# Patient Record
Sex: FEMALE | Race: WHITE | Employment: UNEMPLOYED | URBAN - METROPOLITAN AREA
[De-identification: names, ages, dates, MRNs, and addresses within clinical notes are randomized per-mention and may not be internally consistent; named-entity substitution may affect disease eponyms.]

---

## 2017-06-27 PROBLEM — N84.1 CERVICAL POLYP: Status: ACTIVE | Noted: 2017-06-27

## 2017-06-27 PROBLEM — N95.0 POSTMENOPAUSAL BLEEDING: Status: ACTIVE | Noted: 2017-06-27

## 2017-06-27 PROBLEM — Z01.419 WOMEN'S ANNUAL ROUTINE GYNECOLOGICAL EXAMINATION: Status: ACTIVE | Noted: 2017-06-27

## 2017-07-24 PROBLEM — R10.2 PELVIC PAIN IN FEMALE: Status: ACTIVE | Noted: 2017-07-24

## 2017-07-26 ENCOUNTER — HOSPITAL ENCOUNTER (EMERGENCY)
Age: 62
Discharge: HOME | End: 2017-07-26
Attending: EMERGENCY MEDICINE
Payer: MEDICARE

## 2017-07-26 ENCOUNTER — APPOINTMENT (OUTPATIENT)
Dept: CT IMAGING | Age: 62
End: 2017-07-26
Attending: EMERGENCY MEDICINE
Payer: MEDICARE

## 2017-07-26 VITALS
RESPIRATION RATE: 16 BRPM | SYSTOLIC BLOOD PRESSURE: 132 MMHG | DIASTOLIC BLOOD PRESSURE: 80 MMHG | OXYGEN SATURATION: 100 % | HEIGHT: 62 IN | TEMPERATURE: 97.5 F | HEART RATE: 94 BPM | BODY MASS INDEX: 44.72 KG/M2 | WEIGHT: 243 LBS

## 2017-07-26 DIAGNOSIS — R10.9 ABDOMINAL PAIN, OTHER SPECIFIED SITE: Primary | ICD-10-CM

## 2017-07-26 LAB
ALBUMIN SERPL BCP-MCNC: 3.8 G/DL (ref 3.2–4.6)
ALBUMIN/GLOB SERPL: 1.2 {RATIO} (ref 1.2–3.5)
ALP SERPL-CCNC: 145 U/L (ref 50–136)
ALT SERPL-CCNC: 42 U/L (ref 12–65)
ANION GAP BLD CALC-SCNC: 9 MMOL/L (ref 7–16)
AST SERPL W P-5'-P-CCNC: 18 U/L (ref 15–37)
BASOPHILS # BLD AUTO: 0 K/UL (ref 0–0.2)
BASOPHILS # BLD: 0 % (ref 0–2)
BILIRUB SERPL-MCNC: 1.1 MG/DL (ref 0.2–1.1)
BUN SERPL-MCNC: 20 MG/DL (ref 8–23)
CALCIUM SERPL-MCNC: 9.2 MG/DL (ref 8.3–10.4)
CHLORIDE SERPL-SCNC: 108 MMOL/L (ref 98–107)
CO2 SERPL-SCNC: 30 MMOL/L (ref 21–32)
CREAT SERPL-MCNC: 1.06 MG/DL (ref 0.6–1)
DIFFERENTIAL METHOD BLD: ABNORMAL
EOSINOPHIL # BLD: 0.1 K/UL (ref 0–0.8)
EOSINOPHIL NFR BLD: 1 % (ref 0.5–7.8)
ERYTHROCYTE [DISTWIDTH] IN BLOOD BY AUTOMATED COUNT: 13.4 % (ref 11.9–14.6)
GLOBULIN SER CALC-MCNC: 3.2 G/DL (ref 2.3–3.5)
GLUCOSE SERPL-MCNC: 91 MG/DL (ref 65–100)
HCT VFR BLD AUTO: 44.8 % (ref 35.8–46.3)
HGB BLD-MCNC: 15.6 G/DL (ref 11.7–15.4)
IMM GRANULOCYTES # BLD: 0 K/UL (ref 0–0.5)
IMM GRANULOCYTES NFR BLD AUTO: 0.2 % (ref 0–5)
LYMPHOCYTES # BLD AUTO: 44 % (ref 13–44)
LYMPHOCYTES # BLD: 4.7 K/UL (ref 0.5–4.6)
MCH RBC QN AUTO: 31 PG (ref 26.1–32.9)
MCHC RBC AUTO-ENTMCNC: 34.8 G/DL (ref 31.4–35)
MCV RBC AUTO: 89.1 FL (ref 79.6–97.8)
MONOCYTES # BLD: 0.6 K/UL (ref 0.1–1.3)
MONOCYTES NFR BLD AUTO: 6 % (ref 4–12)
NEUTS SEG # BLD: 5.3 K/UL (ref 1.7–8.2)
NEUTS SEG NFR BLD AUTO: 49 % (ref 43–78)
PLATELET # BLD AUTO: 223 K/UL (ref 150–450)
PMV BLD AUTO: 10.1 FL (ref 10.8–14.1)
POTASSIUM SERPL-SCNC: 3.6 MMOL/L (ref 3.5–5.1)
PROT SERPL-MCNC: 7 G/DL (ref 6.3–8.2)
RBC # BLD AUTO: 5.03 M/UL (ref 4.05–5.25)
SODIUM SERPL-SCNC: 147 MMOL/L (ref 136–145)
WBC # BLD AUTO: 10.8 K/UL (ref 4.3–11.1)

## 2017-07-26 PROCEDURE — 96361 HYDRATE IV INFUSION ADD-ON: CPT | Performed by: EMERGENCY MEDICINE

## 2017-07-26 PROCEDURE — 81003 URINALYSIS AUTO W/O SCOPE: CPT | Performed by: EMERGENCY MEDICINE

## 2017-07-26 PROCEDURE — 96375 TX/PRO/DX INJ NEW DRUG ADDON: CPT | Performed by: EMERGENCY MEDICINE

## 2017-07-26 PROCEDURE — 96374 THER/PROPH/DIAG INJ IV PUSH: CPT | Performed by: EMERGENCY MEDICINE

## 2017-07-26 PROCEDURE — 99284 EMERGENCY DEPT VISIT MOD MDM: CPT | Performed by: EMERGENCY MEDICINE

## 2017-07-26 PROCEDURE — 74011250636 HC RX REV CODE- 250/636: Performed by: EMERGENCY MEDICINE

## 2017-07-26 PROCEDURE — 80053 COMPREHEN METABOLIC PANEL: CPT | Performed by: EMERGENCY MEDICINE

## 2017-07-26 PROCEDURE — 74176 CT ABD & PELVIS W/O CONTRAST: CPT

## 2017-07-26 PROCEDURE — 85025 COMPLETE CBC W/AUTO DIFF WBC: CPT | Performed by: EMERGENCY MEDICINE

## 2017-07-26 RX ORDER — ONDANSETRON 2 MG/ML
4 INJECTION INTRAMUSCULAR; INTRAVENOUS
Status: COMPLETED | OUTPATIENT
Start: 2017-07-26 | End: 2017-07-26

## 2017-07-26 RX ORDER — MORPHINE SULFATE 4 MG/ML
4 INJECTION, SOLUTION INTRAMUSCULAR; INTRAVENOUS
Status: COMPLETED | OUTPATIENT
Start: 2017-07-26 | End: 2017-07-26

## 2017-07-26 RX ORDER — HYOSCYAMINE SULFATE 0.12 MG/1
0.12 TABLET SUBLINGUAL
Qty: 15 TAB | Refills: 0 | Status: SHIPPED | OUTPATIENT
Start: 2017-07-26 | End: 2017-08-16

## 2017-07-26 RX ADMIN — SODIUM CHLORIDE 1000 ML: 900 INJECTION, SOLUTION INTRAVENOUS at 19:27

## 2017-07-26 RX ADMIN — ONDANSETRON 4 MG: 2 INJECTION INTRAMUSCULAR; INTRAVENOUS at 19:27

## 2017-07-26 RX ADMIN — MORPHINE SULFATE 4 MG: 4 INJECTION, SOLUTION INTRAMUSCULAR; INTRAVENOUS at 19:27

## 2017-07-26 NOTE — ED PROVIDER NOTES
HPI Comments: 56-year-old white female presents with lower abdominal pain which has been present for the past 2 weeks. She states began within hours of having a ultrasound performed on her uterus. She has had nausea and vomiting for the past 4 days. No diarrhea, constipation or urinary changes. Her OB/GYN has placed her on multiple rounds of antibiotics to Zithromax and doxycycline as well as hydrocodone without improvement in her pain. She went to the office today and was advised that she needs a CT scan and that it would be much faster if she went to the emergency department to have it done. Patient is a 57 y.o. female presenting with skin problem and abdominal pain. The history is provided by the patient. Skin Problem      Abdominal Pain    Pertinent negatives include no fever, no diarrhea, no nausea, no vomiting, no constipation, no dysuria, no headaches and no back pain.         Past Medical History:   Diagnosis Date    Arthritis     Bleeding from nipple in female     Breast lump     CKD (chronic kidney disease) 11/13/15    followed by Dr. 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    History of DVT of lower extremity     1973 (left leg)     2007 (right leg, following knee surgery)    HTN (hypertension)     controlled with lisinopril    Hx of gout     Hyperlipidemia     controlled with lipitor    Left ear hearing loss 11/11/2016    Mitral valve prolapse     last echo ?2004 at [facility] Morbid obesity ([facility] [address] 11/13/15    BMI 44.7    Pinched nerve in neck     9/2016    Post-operative nausea and vomiting     PUD (peptic ulcer disease) 1980's    Sleep apnea     pt states due to her snoring; no cpap    Unspecified adverse effect of anesthesia 1983    delayed awakening with D&C    Vitamin D deficiency     takes supplement       Past Surgical History:   Procedure Laterality Date    HX APPENDECTOMY      HX BREAST BIOPSY Left 11/20/2015    BREAST NEEDLE LOCALIZED LUMPECTOMY LEFT / PREOP @1000/ BREAST CENTER @1130 FOR LEFT US NEEDLE LOC/ SURGERY @1330 performed by Adela Rivas MD at [address] HX COLONOSCOPY  2015    polyps; pt states pre-cancerous    HX DILATION AND CURETTAGE  1982, 1999, 2003    X3    HX HEENT Bilateral at age 1    muscle & balances sx    HX ORTHOPAEDIC Right     knee sx X2; torn meniscus & acl torn     HX TONSILLECTOMY      SHOULDER SURG PROC UNLISTED Bilateral last ones 2006 & 2007    \"multiple\"; last shoulder sx pt states came in for left shoulder repair; pt states while being moved from stretcher to OR table operating employee pulled on Right arm while pt telling her to stop & pt states right rotator cuff was torn         Family History:   Problem Relation Age of Onset    Cancer Mother      lung cancer    Parkinsonism Father     Cancer Brother      nose cancer    Cancer Brother      lung cancer    Diabetes Maternal Grandfather     Hypertension Paternal Grandfather     Stroke Paternal Grandfather     Breast Cancer Neg Hx     Ovarian Cancer Neg Hx        Social History     Social History    Marital status:      Spouse name: N/A    Number of children: N/A    Years of education: N/A     Occupational History    Not on file. Social History Main Topics    Smoking status: Never Smoker    Smokeless tobacco: Never Used    Alcohol use No    Drug use: No    Sexual activity: Not on file     Other Topics Concern    Caffeine Concern No    Exercise No    Seat Belt Yes    Self-Exams Yes     Social History Narrative    , but divorce process has been going on for 3 years.  lives in [place]. Worked as , but now disabled due to CVA residual effects. Has 2 sons. ALLERGIES: Iodinated contrast- oral and iv dye; Ultram [tramadol]; Aloe vera; Codeine; Erythromycin; Heparin analogues; Lanolin; Other medication; Vigamox [moxifloxacin]; Aspartame; Augmentin [amoxicillin-pot clavulanate]; Cefzil [cefprozil]; and Sulfa (sulfonamide antibiotics)    Review of Systems   Constitutional: Negative for fever. HENT: Negative for congestion. Respiratory: Negative for cough and shortness of breath. Gastrointestinal: Positive for abdominal pain. Negative for constipation, diarrhea, nausea and vomiting. Genitourinary: Negative for dysuria. Musculoskeletal: Negative for back pain and neck pain. Skin: Negative for rash. Neurological: Negative for headaches. Vitals:    07/26/17 1657   BP: 136/83   Pulse: 99   Resp: 20   Temp: 97.5 °F (36.4 °C)   SpO2: 100%   Weight: 110.2 kg (243 lb)   Height: 5' 2\" (1.575 m)            Physical Exam   Constitutional: She appears well-developed and well-nourished. No distress. HENT:   Head: Normocephalic and atraumatic. Mouth/Throat: Oropharynx is clear and moist.   Eyes: Conjunctivae are normal. Pupils are equal, round, and reactive to light. Neck: Normal range of motion. Neck supple. Cardiovascular: Normal rate and regular rhythm. No murmur heard. Pulmonary/Chest: Effort normal and breath sounds normal.   Abdominal: Soft. There is tenderness in the right lower quadrant, suprapubic area and left lower quadrant. There is no rigidity, no rebound, no guarding and no CVA tenderness. Musculoskeletal: Normal range of motion. Neurological: She is alert. Skin: Skin is warm and dry. Psychiatric: She has a normal mood and affect. Nursing note and vitals reviewed. MDM  Number of Diagnoses or Management Options  Diagnosis management comments: Blood work is unremarkable. Urinalysis shows no infection.   CT abdomen and pelvis shows no acute abnormality. Etiology for her pain is not clear but does not appear to be a surgical or infectious emergency. He has been present for 2 weeks and further workup can be performed by primary care.        Amount and/or Complexity of Data Reviewed  Clinical lab tests: ordered and reviewed  Tests in the radiology section of CPT®: ordered and reviewed  Tests in the medicine section of CPT®: ordered and reviewed      ED Course       Procedures

## 2017-07-26 NOTE — ED TRIAGE NOTES
Patient states was sent to ER by PCP. Patient reports lower abd pain and left breast pain around skin problem. Patient also c/o nausea, vomiting x 3-4 days.

## 2017-07-26 NOTE — DISCHARGE INSTRUCTIONS

## 2017-07-27 ENCOUNTER — HOSPITAL ENCOUNTER (OUTPATIENT)
Dept: SURGERY | Age: 62
Discharge: HOME | End: 2017-07-27
Attending: OBSTETRICS & GYNECOLOGY

## 2017-07-27 VITALS
SYSTOLIC BLOOD PRESSURE: 165 MMHG | HEART RATE: 82 BPM | OXYGEN SATURATION: 98 % | WEIGHT: 243.6 LBS | DIASTOLIC BLOOD PRESSURE: 84 MMHG | TEMPERATURE: 96.4 F | RESPIRATION RATE: 18 BRPM | HEIGHT: 62 IN | BODY MASS INDEX: 44.83 KG/M2

## 2017-07-27 RX ORDER — ENOXAPARIN SODIUM 150 MG/ML
120 INJECTION SUBCUTANEOUS
COMMUNITY
End: 2017-08-16

## 2017-07-27 NOTE — H&P
[facility] H&P      Assessment/Plan    Problem List  Date Reviewed: 7/24/2017          Codes Class    Pelvic pain in female ICD-10-CM: R10.2  ICD-9-CM: 625.9     Overview Signed 7/24/2017 11:50 AM by Stacey Clements MD     noted             Women's annual routine gynecological examination ICD-10-CM: Z01.419  ICD-9-CM: V72.31     Overview Signed 6/27/2017  2:15 PM by Stacey Clements MD     Pap done 6/27/17             Postmenopausal bleeding ICD-10-CM: N95.0  ICD-9-CM: 627.1     Overview Addendum 7/12/2017  2:42 PM by Stacey Clements MD     Noted    Present and reviewed US with pt  D/W her that in light of thickened EMS and polyp I would consider hyst D&C with polypectomy in OR (also with pt signif discomfort with pap/exam last visit)  ACOG pamphlet on hyst D&C given to pt. Pt to get clearance and recs for lovenox transition by PCP prior to scheduleing procedure  D/W pt at length risks/benefits of procedure including but not limited to death, bleeding, infection, perforation and damage to other internal organs. She exhibited full understanding and wishes to proceed.                Cervical polyp ICD-10-CM: N84.1  ICD-9-CM: 622.7     Overview Addendum 7/12/2017  2:42 PM by Stacey Clements MD     Noted 6/27/17    Plan removal in OR with coinciding hyst D&C             Left ear hearing loss ICD-10-CM: H91.92  ICD-9-CM: 389.9         Hoarseness ICD-10-CM: R49.0  ICD-9-CM: 784.42         Decreased GFR ICD-10-CM: R94.4  ICD-9-CM: 794.4         Post traumatic stress disorder (PTSD) ICD-10-CM: F43.10  ICD-9-CM: 309.81         Insomnia, idiopathic ICD-10-CM: F51.01  ICD-9-CM: 307.42         Snoring ICD-10-CM: R06.83  ICD-9-CM: 786.09         Fatigue ICD-10-CM: R53.83  ICD-9-CM: 780.79         Hyperuricemia ICD-10-CM: E79.0  ICD-9-CM: 790.6         Chronic renal disease, stage 3, moderately decreased glomerular filtration rate (GFR) between 30-59 mL/min/1.73 square meter ICD-10-CM: N18.3  ICD-9-CM: 585.3         B12 deficiency ICD-10-CM: E53.8  ICD-9-CM: 266.2         History of CVA (cerebrovascular accident) ICD-10-CM: Z86.73  ICD-9-CM: V12.54         GERD (gastroesophageal reflux disease) ICD-10-CM: K21.9  ICD-9-CM: 530.81         HTN (hypertension) ICD-10-CM: I10  ICD-9-CM: 401.9         Gouty arthropathy ICD-10-CM: M10.9  ICD-9-CM: 274.00         Current use of long term anticoagulation ICD-10-CM: Z79.01  ICD-9-CM: V58.61         H/O right knee surgery ICD-10-CM: Z98.890  ICD-9-CM: V15.29         History of DVT of lower extremity ICD-10-CM: Z86.718  ICD-9-CM: V12.51         Hyperlipidemia ICD-10-CM: E78.5  ICD-9-CM: 272.4         IFG (impaired fasting glucose) ICD-10-CM: R73.01  ICD-9-CM: 790.21               Subjective    Rozanna Chin 57 y.o. presents today for surgical evaluation/treatment of the condition noted above. She is without any new complaints or issues.     OB History    Para Term  AB Living   5 3    3   SAB TAB Ectopic Molar Multiple Live Births        3      # Outcome Date GA Lbr Prashanth/2nd Weight Sex Delivery Anes PTL Lv   5          DOMITILA   4          DOMITILA   3 Para         DOMITILA   2 Para            1 Para                   Past Medical History:   Diagnosis Date    Arthritis     Bleeding from nipple in female     Breast lump     CKD (chronic kidney disease) 2015    followed by Dr. Lizet Donaldson; pt states now \"stage 1\"    Current use of long term anticoagulation     pt takes coumadin due to hx of CVA    GERD (gastroesophageal reflux disease)     controlled with med    H/O right knee surgery      (twice within a few weeks)    History of CVA (cerebrovascular accident)     \"optic eye stroke\"; blindness in left eye & complete loss of hearing to left ear    History of DVT of lower extremity      (left leg)      (right leg, following knee surgery)    HTN (hypertension)     controlled with lisinopril    Hx of gout     Hyperlipidemia     controlled with lipitor    Left ear hearing loss 11/11/2016    Mitral valve prolapse     last echo ?2004 at [facility] Morbid obesity ([facility] [address] 11/13/15    BMI 44.7    Pinched nerve in neck     9/2016    Post-operative nausea and vomiting     pt reports severe N/V : request e-mend or scopolamine patch; pt reports hoarseness, cough s/p intubation    PUD (peptic ulcer disease) 1980's    Sleep apnea     pt states due to her snoring; no cpap    Unspecified adverse effect of anesthesia 1983    delayed awakening with D&C    Vitamin D deficiency     takes supplement       Past Surgical History:   Procedure Laterality Date    HX APPENDECTOMY      HX BREAST BIOPSY Left 11/20/2015    BREAST NEEDLE LOCALIZED LUMPECTOMY LEFT / PREOP @1000/ BREAST CENTER @1130 FOR LEFT US NEEDLE LOC/ SURGERY @1330 performed by Vaibhav Shipley MD at [address] [place] [address] HX COLONOSCOPY  2015    polyps; pt states pre-cancerous    HX DILATION AND CURETTAGE  1982, 1999, 2003    X3    HX HEENT Bilateral at age 1    muscle & balances sx    HX ORTHOPAEDIC Right     knee sx X2; torn meniscus & acl torn     HX TONSILLECTOMY      SHOULDER SURG PROC UNLISTED Bilateral last ones 2006 & 2007    \"multiple\"; last shoulder sx pt states came in for left shoulder repair; pt states while being moved from stretcher to OR table operating employee pulled on Right arm while pt telling her to stop & pt states right rotator cuff was torn       Family History   Problem Relation Age of Onset    Cancer Mother      lung cancer    Parkinsonism Father     Cancer Brother      nose cancer    Cancer Brother      lung cancer    Diabetes Maternal Grandfather     Hypertension Paternal Grandfather     Stroke Paternal Grandfather     Breast Cancer Neg Hx     Ovarian Cancer Neg Hx        Social History     Social History    Marital status:      Spouse name: N/A    Number of children: N/A    Years of education: N/A     Occupational History    Not on file. Social History Main Topics    Smoking status: Never Smoker    Smokeless tobacco: Never Used    Alcohol use No    Drug use: No    Sexual activity: Not on file     Other Topics Concern    Caffeine Concern No    Exercise No    Seat Belt Yes    Self-Exams Yes     Social History Narrative    , but divorce process has been going on for 3 years.  lives in [place]. Worked as , but now disabled due to CVA residual effects. Has 2 sons. Allergies   Allergen Reactions    Iodinated Contrast- Oral And Iv Dye Anaphylaxis    Ultram [Tramadol] Anaphylaxis    Aloe Vera Hives    Codeine Nausea and Vomiting    Erythromycin Other (comments)     Herpes infection; pt states from tip of tongue down to digestive system.  Heparin Analogues Hives     Pt states ok to take warfarin.     Lanolin Hives    Other Medication Hives     Allergic to most antibiotics    Vigamox [Moxifloxacin] Itching    Aspartame Nausea and Vomiting    Augmentin [Amoxicillin-Pot Clavulanate] Itching    Cefzil [Cefprozil] Rash    Morphine Nausea and Vomiting    Sulfa (Sulfonamide Antibiotics) Itching         Review of Systems:    Constitutional: No fevers or chills     CV: No chest pain or palpatations    Resp: No SOB or cough    GI: No nausea/vomiting/diarrhea/constipation    Neuro: No HA, no seizure like activity    Skin: No rashes or lesions     : No dysuria or hematuria        Objective    Visit Vitals    LMP  (LMP Unknown)         Physical Exam    Gen: alert and cooperative, NAD    HEENT: NCAT    CV: RRR    Resp: CTA bilat    Abd: soft, NT, NABS    EXT: trace edema bilat    Gyn: done as per prev office visit    Neuro: No focal deficits    Skin: No noted rashes or lesions       Roslyn Gomez MD  1:46 PM  07/27/17

## 2017-08-01 ENCOUNTER — ANESTHESIA EVENT (OUTPATIENT)
Dept: SURGERY | Age: 62
End: 2017-08-01
Payer: MEDICARE

## 2017-08-02 ENCOUNTER — ANESTHESIA (OUTPATIENT)
Dept: SURGERY | Age: 62
End: 2017-08-02
Payer: MEDICARE

## 2017-08-02 ENCOUNTER — HOSPITAL ENCOUNTER (OUTPATIENT)
Age: 62
Setting detail: OUTPATIENT SURGERY
Discharge: HOME | End: 2017-08-02
Attending: OBSTETRICS & GYNECOLOGY | Admitting: OBSTETRICS & GYNECOLOGY
Payer: MEDICARE

## 2017-08-02 VITALS
WEIGHT: 241 LBS | OXYGEN SATURATION: 95 % | RESPIRATION RATE: 16 BRPM | HEART RATE: 77 BPM | SYSTOLIC BLOOD PRESSURE: 154 MMHG | DIASTOLIC BLOOD PRESSURE: 79 MMHG | BODY MASS INDEX: 44.8 KG/M2 | TEMPERATURE: 99.2 F

## 2017-08-02 DIAGNOSIS — N95.0 POSTMENOPAUSAL BLEEDING: Primary | ICD-10-CM

## 2017-08-02 PROCEDURE — 76210000020 HC REC RM PH II FIRST 0.5 HR: Performed by: OBSTETRICS & GYNECOLOGY

## 2017-08-02 PROCEDURE — 76010000160 HC OR TIME 0.5 TO 1 HR INTENSV-TIER 1: Performed by: OBSTETRICS & GYNECOLOGY

## 2017-08-02 PROCEDURE — 76060000032 HC ANESTHESIA 0.5 TO 1 HR: Performed by: OBSTETRICS & GYNECOLOGY

## 2017-08-02 PROCEDURE — 77030033135 HC DEV TISS RMVL HYSTSCP MYOSUR HOLO -G1: Performed by: OBSTETRICS & GYNECOLOGY

## 2017-08-02 PROCEDURE — 77030032490 HC SLV COMPR SCD KNE COVD -B: Performed by: OBSTETRICS & GYNECOLOGY

## 2017-08-02 PROCEDURE — 88305 TISSUE EXAM BY PATHOLOGIST: CPT | Performed by: OBSTETRICS & GYNECOLOGY

## 2017-08-02 PROCEDURE — 77030011640 HC PAD GRND REM COVD -A: Performed by: OBSTETRICS & GYNECOLOGY

## 2017-08-02 PROCEDURE — 77030033136 HC TBNG INFLO AQUILEX ST HOLO -C: Performed by: OBSTETRICS & GYNECOLOGY

## 2017-08-02 PROCEDURE — 74011250636 HC RX REV CODE- 250/636

## 2017-08-02 PROCEDURE — 74011250636 HC RX REV CODE- 250/636: Performed by: ANESTHESIOLOGY

## 2017-08-02 PROCEDURE — 74011250637 HC RX REV CODE- 250/637: Performed by: ANESTHESIOLOGY

## 2017-08-02 PROCEDURE — 74011000250 HC RX REV CODE- 250

## 2017-08-02 PROCEDURE — 74011000250 HC RX REV CODE- 250: Performed by: ANESTHESIOLOGY

## 2017-08-02 PROCEDURE — 77030033137 HC TBNG OUTFLO AQUILEX ST HOLO -B: Performed by: OBSTETRICS & GYNECOLOGY

## 2017-08-02 PROCEDURE — 77030012317 HC CATH URET INT COVD -A: Performed by: OBSTETRICS & GYNECOLOGY

## 2017-08-02 PROCEDURE — 76210000006 HC OR PH I REC 0.5 TO 1 HR: Performed by: OBSTETRICS & GYNECOLOGY

## 2017-08-02 PROCEDURE — 77030018836 HC SOL IRR NACL ICUM -A: Performed by: OBSTETRICS & GYNECOLOGY

## 2017-08-02 RX ORDER — PROPOFOL 10 MG/ML
INJECTION, EMULSION INTRAVENOUS AS NEEDED
Status: DISCONTINUED | OUTPATIENT
Start: 2017-08-02 | End: 2017-08-02 | Stop reason: HOSPADM

## 2017-08-02 RX ORDER — FENTANYL CITRATE 50 UG/ML
INJECTION, SOLUTION INTRAMUSCULAR; INTRAVENOUS AS NEEDED
Status: DISCONTINUED | OUTPATIENT
Start: 2017-08-02 | End: 2017-08-02 | Stop reason: HOSPADM

## 2017-08-02 RX ORDER — SODIUM CHLORIDE 0.9 % (FLUSH) 0.9 %
5-10 SYRINGE (ML) INJECTION AS NEEDED
Status: DISCONTINUED | OUTPATIENT
Start: 2017-08-02 | End: 2017-08-02 | Stop reason: HOSPADM

## 2017-08-02 RX ORDER — HYDROCODONE BITARTRATE AND ACETAMINOPHEN 5; 325 MG/1; MG/1
2 TABLET ORAL AS NEEDED
Status: DISCONTINUED | OUTPATIENT
Start: 2017-08-02 | End: 2017-08-02 | Stop reason: HOSPADM

## 2017-08-02 RX ORDER — FENTANYL CITRATE 50 UG/ML
100 INJECTION, SOLUTION INTRAMUSCULAR; INTRAVENOUS ONCE
Status: DISCONTINUED | OUTPATIENT
Start: 2017-08-02 | End: 2017-08-02 | Stop reason: HOSPADM

## 2017-08-02 RX ORDER — LIDOCAINE HYDROCHLORIDE 10 MG/ML
0.1 INJECTION INFILTRATION; PERINEURAL AS NEEDED
Status: DISCONTINUED | OUTPATIENT
Start: 2017-08-02 | End: 2017-08-02 | Stop reason: HOSPADM

## 2017-08-02 RX ORDER — FAMOTIDINE 20 MG/1
20 TABLET, FILM COATED ORAL ONCE
Status: COMPLETED | OUTPATIENT
Start: 2017-08-02 | End: 2017-08-02

## 2017-08-02 RX ORDER — SODIUM CHLORIDE 0.9 % (FLUSH) 0.9 %
5-10 SYRINGE (ML) INJECTION EVERY 8 HOURS
Status: DISCONTINUED | OUTPATIENT
Start: 2017-08-02 | End: 2017-08-02 | Stop reason: HOSPADM

## 2017-08-02 RX ORDER — SODIUM CHLORIDE, SODIUM LACTATE, POTASSIUM CHLORIDE, CALCIUM CHLORIDE 600; 310; 30; 20 MG/100ML; MG/100ML; MG/100ML; MG/100ML
75 INJECTION, SOLUTION INTRAVENOUS CONTINUOUS
Status: DISCONTINUED | OUTPATIENT
Start: 2017-08-02 | End: 2017-08-02 | Stop reason: HOSPADM

## 2017-08-02 RX ORDER — OXYCODONE HYDROCHLORIDE 5 MG/1
5 TABLET ORAL
Status: DISCONTINUED | OUTPATIENT
Start: 2017-08-02 | End: 2017-08-02 | Stop reason: HOSPADM

## 2017-08-02 RX ORDER — ONDANSETRON 2 MG/ML
INJECTION INTRAMUSCULAR; INTRAVENOUS AS NEEDED
Status: DISCONTINUED | OUTPATIENT
Start: 2017-08-02 | End: 2017-08-02 | Stop reason: HOSPADM

## 2017-08-02 RX ORDER — DEXTROSE, SODIUM CHLORIDE, SODIUM LACTATE, POTASSIUM CHLORIDE, AND CALCIUM CHLORIDE 5; .6; .31; .03; .02 G/100ML; G/100ML; G/100ML; G/100ML; G/100ML
125 INJECTION, SOLUTION INTRAVENOUS CONTINUOUS
Status: DISCONTINUED | OUTPATIENT
Start: 2017-08-02 | End: 2017-08-02 | Stop reason: HOSPADM

## 2017-08-02 RX ORDER — MIDAZOLAM HYDROCHLORIDE 1 MG/ML
2 INJECTION, SOLUTION INTRAMUSCULAR; INTRAVENOUS
Status: DISCONTINUED | OUTPATIENT
Start: 2017-08-02 | End: 2017-08-02 | Stop reason: HOSPADM

## 2017-08-02 RX ORDER — DEXAMETHASONE SODIUM PHOSPHATE 4 MG/ML
INJECTION, SOLUTION INTRA-ARTICULAR; INTRALESIONAL; INTRAMUSCULAR; INTRAVENOUS; SOFT TISSUE AS NEEDED
Status: DISCONTINUED | OUTPATIENT
Start: 2017-08-02 | End: 2017-08-02 | Stop reason: HOSPADM

## 2017-08-02 RX ORDER — HYDROMORPHONE HYDROCHLORIDE 2 MG/ML
0.5 INJECTION, SOLUTION INTRAMUSCULAR; INTRAVENOUS; SUBCUTANEOUS
Status: DISCONTINUED | OUTPATIENT
Start: 2017-08-02 | End: 2017-08-02 | Stop reason: HOSPADM

## 2017-08-02 RX ORDER — HYDROCODONE BITARTRATE AND ACETAMINOPHEN 5; 325 MG/1; MG/1
1 TABLET ORAL
Qty: 15 TAB | Refills: 0 | Status: SHIPPED | OUTPATIENT
Start: 2017-08-02 | End: 2017-08-16

## 2017-08-02 RX ORDER — PROMETHAZINE HYDROCHLORIDE 25 MG/ML
INJECTION, SOLUTION INTRAMUSCULAR; INTRAVENOUS
Status: DISCONTINUED
Start: 2017-08-02 | End: 2017-08-02 | Stop reason: HOSPADM

## 2017-08-02 RX ORDER — LIDOCAINE HYDROCHLORIDE 20 MG/ML
INJECTION, SOLUTION EPIDURAL; INFILTRATION; INTRACAUDAL; PERINEURAL AS NEEDED
Status: DISCONTINUED | OUTPATIENT
Start: 2017-08-02 | End: 2017-08-02 | Stop reason: HOSPADM

## 2017-08-02 RX ORDER — MIDAZOLAM HYDROCHLORIDE 1 MG/ML
5 INJECTION, SOLUTION INTRAMUSCULAR; INTRAVENOUS ONCE
Status: DISCONTINUED | OUTPATIENT
Start: 2017-08-02 | End: 2017-08-02 | Stop reason: HOSPADM

## 2017-08-02 RX ADMIN — FAMOTIDINE 20 MG: 20 TABLET, FILM COATED ORAL at 11:54

## 2017-08-02 RX ADMIN — FENTANYL CITRATE 50 MCG: 50 INJECTION, SOLUTION INTRAMUSCULAR; INTRAVENOUS at 12:30

## 2017-08-02 RX ADMIN — MIDAZOLAM HYDROCHLORIDE 2 MG: 1 INJECTION, SOLUTION INTRAMUSCULAR; INTRAVENOUS at 12:02

## 2017-08-02 RX ADMIN — HYDROMORPHONE HYDROCHLORIDE 0.5 MG: 2 INJECTION, SOLUTION INTRAMUSCULAR; INTRAVENOUS; SUBCUTANEOUS at 13:14

## 2017-08-02 RX ADMIN — HYDROMORPHONE HYDROCHLORIDE 0.5 MG: 2 INJECTION, SOLUTION INTRAMUSCULAR; INTRAVENOUS; SUBCUTANEOUS at 13:09

## 2017-08-02 RX ADMIN — FENTANYL CITRATE 50 MCG: 50 INJECTION, SOLUTION INTRAMUSCULAR; INTRAVENOUS at 12:21

## 2017-08-02 RX ADMIN — PROPOFOL 200 MG: 10 INJECTION, EMULSION INTRAVENOUS at 12:21

## 2017-08-02 RX ADMIN — DEXAMETHASONE SODIUM PHOSPHATE 4 MG: 4 INJECTION, SOLUTION INTRA-ARTICULAR; INTRALESIONAL; INTRAMUSCULAR; INTRAVENOUS; SOFT TISSUE at 12:26

## 2017-08-02 RX ADMIN — PROMETHAZINE HYDROCHLORIDE 3.12 MG: 25 INJECTION INTRAMUSCULAR; INTRAVENOUS at 13:32

## 2017-08-02 RX ADMIN — LIDOCAINE HYDROCHLORIDE 100 MG: 20 INJECTION, SOLUTION EPIDURAL; INFILTRATION; INTRACAUDAL; PERINEURAL at 12:21

## 2017-08-02 RX ADMIN — LIDOCAINE HYDROCHLORIDE 0.1 ML: 10 INJECTION, SOLUTION INFILTRATION; PERINEURAL at 11:55

## 2017-08-02 RX ADMIN — SODIUM CHLORIDE, SODIUM LACTATE, POTASSIUM CHLORIDE, AND CALCIUM CHLORIDE 75 ML/HR: 600; 310; 30; 20 INJECTION, SOLUTION INTRAVENOUS at 11:54

## 2017-08-02 RX ADMIN — ONDANSETRON 4 MG: 2 INJECTION INTRAMUSCULAR; INTRAVENOUS at 12:26

## 2017-08-02 NOTE — OP NOTES
Hortensia Douglass    1955          Preop Dx:   1. Postmenopausal bleeding    2. Cervical polyp      Postop Dx:   1. Postmenopausal bleeding    2. Cervical polyp    3. Endometrial polyps      Procedure:   1. Hysteroscopy D&C with Myosure polyp removal    2. Cervical polypectomy      Surgeon:  Connie Thompson        Anesthesia:  LMA general        EBL:  5 mL     IVF:  1000 mL     UOP:  50 mL        Complications:  None      Findings:  Large (approx 4-5 cm) cervical polyp with multiple endometrial polyps appreciated      Procedure:    Patient was taken to the operating room where general anesthesia was found to be adequate. She was prepped and draped in the usual sterile fashion and placed in the lithotomy position in [employer] Weighted speculum was placed in patient's vagina and anterior lip of cervix grasped with a single tooth tenaculum. Large cervical polyp grasped and twisted for easy removal. Cervix was then sequentially dilated with Hegar dilators. Hysteroscope was introduced without difficulty and findings as above. Myosure was introduced and removal of endometrial polyps performed without dificulty. Hysteroscope removed and sharp curettage was undertaken until the uterus had a gritty texture throughout. Hysteroscope re-introduced and smooth endometrial cavity was appreciated. All instruments removed from the patient's vagina and hemostasis was assured throughout. Patient tolerated procedure well. Sponge, lap and needle counts correct x 3.       Disposition:  Pt to RR in stable condition      Pathology: Cervical polyp, Myosure endometrial curretings      Stacey Clements MD     12:54 PM    08/02/17

## 2017-08-02 NOTE — ANESTHESIA POSTPROCEDURE EVALUATION
Post-Anesthesia Evaluation and Assessment    Patient: Sherri Doyle MRN: [id number]  SSN: [id number]    YOB: 1955  Age: 57 y.o. Sex: female       Cardiovascular Function/Vital Signs  Visit Vitals    /79    Pulse 77    Temp 37.3 °C (99.2 °F)    Resp 16    Wt 109.3 kg (241 lb)    SpO2 95%    BMI 44.8 kg/m2       Patient is status post general anesthesia for Procedure(s):  DILATATION AND CURETTAGE HYSTEROSCOPY WITH MYOSURE, CERVICAL POLYPECTOMY, ENDOMETRIAL POLYPECTOMY. Nausea/Vomiting: None    Postoperative hydration reviewed and adequate. Pain:  Pain Scale 1: Numeric (0 - 10) (08/02/17 1354)  Pain Intensity 1: 0 (08/02/17 1354)   Managed    Neurological Status:   Neuro (WDL): Within Defined Limits (08/02/17 1344)  Neuro  Neurologic State: Alert (08/02/17 1344)  Orientation Level: Oriented X4 (08/02/17 1344)   At baseline    Mental Status and Level of Consciousness: Arousable    Pulmonary Status:   O2 Device: Room air (08/02/17 1354)   Adequate oxygenation and airway patent    Complications related to anesthesia: None    Post-anesthesia assessment completed.  No concerns    Signed By: Joshua Hagen MD     August 2, 2017

## 2017-08-02 NOTE — IP AVS SNAPSHOT
[address] 
[phone] Patient: Mercedez Franco MRN: [id number] SDB:[birth year] You are allergic to the following Allergen Reactions Iodinated Contrast- Oral And Iv Dye Anaphylaxis Ultram (Tramadol) Anaphylaxis Aloe Vera Hives Codeine Nausea and Vomiting Erythromycin Other (comments) Herpes infection; pt states from tip of tongue down to digestive system. Heparin Analogues Hives Pt states ok to take warfarin. Lanolin Hives Other Medication Hives Allergic to most antibiotics Vigamox (Moxifloxacin) Itching Aspartame Nausea and Vomiting Augmentin (Amoxicillin-Pot Clavulanate) Itching Cefzil (Cefprozil) Rash Morphine Nausea and Vomiting  
    
 Sulfa (Sulfonamide Antibiotics) Itching Recent Documentation Weight BMI OB Status Smoking Status 109.3 kg 44.8 kg/m2 Postmenopausal Never Smoker Emergency Contacts Name Discharge Info Relation Home Work Mobile [address] [facility]  Son [22] [phone] About your hospitalization You were admitted on:  August 2, 2017 You last received care in the:  [facility] PACU You were discharged on:  August 2, 2017 Unit phone number:  [phone] Why you were hospitalized Your primary diagnosis was:  Not on File Your diagnoses also included:  Postmenopausal Bleeding Providers Seen During Your Hospitalizations Provider Role Specialty Primary office phone Puneet Murray MD Attending Provider Obstetrics & Gynecology [phone] Your Primary Care Physician (PCP) Primary Care Physician Office Phone Office Fax [address] [place] [phone] Follow-up Information Follow up With Details Comments Contact Info Keeley Guzman MD Follow up today call to confirm follow up appointment [address] [facility] [id number] [phone] Current Discharge Medication List  
  
ASK your doctor about these medications Dose & Instructions Dispensing Information Comments Morning Noon Evening Bedtime  
 allopurinol 300 mg tablet Commonly known as:  Bethel Dang Your last dose was: Your next dose is:    
   
   
 Dose:  300 mg Take 1 Tab by mouth daily. Quantity:  90 Tab Refills:  1  
     
   
   
   
  
 colchicine 0.6 mg tablet Commonly known as:  COLCRYS Your last dose was: Your next dose is:    
   
   
 Acute gout: 1.2 mg (2 tabs) PO at signs of attack then 0.6 mg (1 tab) 1 h later. No more than 3 tablets in the first 24 h. Continue taking 1 tablet daily. Quantity:  30 Tab Refills:  1  
     
   
   
   
  
 cyanocobalamin 1,000 mcg tablet Your last dose was: Your next dose is:    
   
   
 Dose:  1500 mcg Take 1,500 mcg by mouth daily. Refills:  0  
     
   
   
   
  
 cyclobenzaprine 10 mg tablet Commonly known as:  FLEXERIL Your last dose was: Your next dose is:    
   
   
 Dose:  10 mg Take 1 Tab by mouth nightly as needed for Muscle Spasm(s). Explained to patient this medication may cause drowsiness, avoid driving or use heavy machinery while taking this medication. Indications: MUSCLE SPASM Quantity:  90 Tab Refills:  1  
     
   
   
   
  
 doxycycline 100 mg tablet Commonly known as:  ADOXA Your last dose was: Your next dose is:    
   
   
 Dose:  100 mg Take 1 Tab by mouth two (2) times a day. Quantity:  14 Tab Refills:  0 DULoxetine 20 mg capsule Commonly known as:  CYMBALTA Your last dose was: Your next dose is:    
   
   
 Dose:  20 mg Take 1 Cap by mouth daily (with breakfast).  Indications: NEUROPATHIC PAIN  
 Quantity:  90 Cap Refills:  1  
     
   
   
   
  
 eszopiclone 3 mg tablet Commonly known as:  Elicia Rogers Your last dose was: Your next dose is:    
   
   
 Dose:  3 mg Take 1 Tab by mouth nightly. Max Daily Amount: 3 mg. Indications: INSOMNIA Quantity:  90 Tab Refills:  1  
     
   
   
   
  
 gabapentin 300 mg capsule Commonly known as:  NEURONTIN Your last dose was: Your next dose is:    
   
   
 Dose:  300 mg Take 1 Cap by mouth three (3) times daily. Indications: NEUROPATHIC PAIN Quantity:  900 Cap Refills:  1  
     
   
   
   
  
 hyoscyamine SL 0.125 mg SL tablet Commonly known as:  LEVSIN/SL Your last dose was: Your next dose is:    
   
   
 Dose:  0.125 mg  
1 Tab by SubLINGual route every four (4) hours as needed for Cramping. Quantity:  15 Tab Refills:  0  
     
   
   
   
  
 lisinopril 20 mg tablet Commonly known as:  Robyn Bills Your last dose was: Your next dose is:    
   
   
 Dose:  20 mg Take 1 Tab by mouth daily. Indications: Hypertension Quantity:  90 Tab Refills:  3 LOVENOX 120 mg/0.8 mL injection Generic drug:  enoxaparin Your last dose was: Your next dose is:    
   
   
 Dose:  120 mg  
120 mg by SubCUTAneous route. Lovenox bridge as directed by PCP Dr Mendel Requena Refills:  0  
     
   
   
   
  
 meloxicam 15 mg tablet Commonly known as:  MOBIC Your last dose was: Your next dose is: TAKE 1/2 TABLET BY MOUTH 2 TIMES A DAY AS NEEDED FOR BACK PAIN  Indications: OSTEOARTHRITIS Quantity:  30 Tab Refills:  1  
     
   
   
   
  
 mupirocin 2 % ointment [facility]  [facility] Your last dose was: Your next dose is:    
   
   
 Apply  to affected area daily. Quantity:  30 g Refills:  0 OMEGA 3 FISH OIL PO Your last dose was: Your next dose is: Dose:  2100 mg Take 2,100 mg by mouth daily. 2100 mg cap--1 cap po qd Refills:  0  
     
   
   
   
  
 omeprazole 40 mg capsule Commonly known as:  PRILOSEC Your last dose was: Your next dose is:    
   
   
 Dose:  40 mg Take 1 Cap by mouth two (2) times a day. Indications: gastroesophageal reflux disease Quantity:  180 Cap Refills:  3  
     
   
   
   
  
 ondansetron 4 mg disintegrating tablet Commonly known as:  ZOFRAN ODT Your last dose was: Your next dose is:    
   
   
 Dose:  4 mg Take 1 Tab by mouth every six (6) hours as needed for Nausea. Quantity:  20 Tab Refills:  0 [employer] Container-Ins Syrng-Ndl 0.3 mL 29 x 1/2\" Syrg Your last dose was: Your next dose is:    
   
   
 Dose:  1 Container 1 Container by Does Not Apply route two (2) times a day. Quantity:  1 Syringe Refills:  0  
     
   
   
   
  
 VITAMIN D3 PO Your last dose was: Your next dose is:    
   
   
 Dose:  54545 Units Take 10,000 Units by mouth daily. Refills:  0  
     
   
   
   
  
 warfarin 6 mg tablet Commonly known as:  COUMADIN Your last dose was: Your next dose is:    
   
   
 Dose:  6 mg Take 1 Tab by mouth daily. Quantity:  90 Tab Refills:  1 Discharge Instructions INSTRUCTIONS FOLLOWING HYSTEROSCOPY 
 
ACTIVITY  Limited activity today; increase as tolerated tomorrow, but no vigorous exercise  Shower only; no tub baths  No douches, tampons or intercourse until your doctor releases you (at least 2 weeks)  May return to work or school as directed by your doctor DIET  Clear liquids until no nausea or vomiting  Advance to regular diet as tolerated PAIN 
 Expect a moderate amount of pain.  Take pain medication as directed by your doctor.  If no prescription for pain is sent home with you, take the appropriate dose of your commonly used pain medication.  Call your doctor if pain is NOT relieved by medication.  DO NOT take aspirin or blood thinners until directed by your doctor. FOLLOW-UP PHONE CALLS  Calls will be made by nursing staff  If you have any problems, call your doctor as needed. CALL YOUR DOCTOR IF 
 Excessive bleeding that soaks a pad in an hour  Temperature of 101°F or above  Green or yellow, smelly discharge  Unable to urinate by bedtime  Nausea and vomiting that does not stop by bedtime AFTER ANESTHESIA  For the first 24 hours: DO NOT Drive, Drink alcoholic beverages, or Make important decisions.  Beware of dizziness following anesthesia and while taking pain medication.  Plan to stay tonight within 1 hours drive of the hospital 
 
 
 
 
Discharge Orders None Introducing [facility] SERVICES! [facility] introduces [employer] patient portal. Now you can access parts of your medical record, email your doctor's office, and request medication refills online. 1. In your internet browser, go to [email or web address] 2. Click on the First Time User? Click Here link in the Sign In box. You will see the New Member Sign Up page. 3. Enter your [employer] Access Code exactly as it appears below. You will not need to use this code after youve completed the sign-up process. If you do not sign up before the expiration date, you must request a new code. · [employer] Access Code: [email or web address] Expires: 9/14/2017  9:31 AM 
 
4. Enter the last four digits of your Social Security Number (xxxx) and Date of Birth (mm/dd/yyyy) as indicated and click Submit. You will be taken to the next sign-up page. 5. Create a [employer] ID. This will be your [employer] login ID and cannot be changed, so think of one that is secure and easy to remember. 6. Create a [employer] password. You can change your password at any time. 7. Enter your Password Reset Question and Answer. This can be used at a later time if you forget your password. 8. Enter your e-mail address. You will receive e-mail notification when new information is available in [address] 9. Click Sign Up. You can now view and download portions of your medical record. 10. Click the Download Summary menu link to download a portable copy of your medical information. If you have questions, please visit the Frequently Asked Questions section of the [employer] website. Remember, [employer] is NOT to be used for urgent needs. For medical emergencies, dial 911. Now available from your iPhone and Android! General Information Please provide this summary of care documentation to your next provider. Patient Signature:  ____________________________________________________________ Date:  ____________________________________________________________  
  
Trevor Benny Provider Signature:  ____________________________________________________________ Date:  ____________________________________________________________

## 2017-08-02 NOTE — DISCHARGE INSTRUCTIONS
INSTRUCTIONS FOLLOWING HYSTEROSCOPY    ACTIVITY   Limited activity today; increase as tolerated tomorrow, but no vigorous exercise   Shower only; no tub baths   No douches, tampons or intercourse until your doctor releases you (at least 2 weeks)   May return to work or school as directed by your doctor    DIET   Clear liquids until no nausea or vomiting   Advance to regular diet as tolerated    PAIN   Expect a moderate amount of pain.  Take pain medication as directed by your doctor. If no prescription for pain is sent home with you, take the appropriate dose of your commonly used pain medication.  Call your doctor if pain is NOT relieved by medication.  DO NOT take aspirin or blood thinners until directed by your doctor. FOLLOW-UP PHONE [address] will be made by nursing staff   If you have any problems, call your doctor as needed. CALL YOUR DOCTOR IF   Excessive bleeding that soaks a pad in an hour   Temperature of 101°F or above   Green or yellow, smelly discharge   Unable to urinate by bedtime   Nausea and vomiting that does not stop by bedtime    AFTER ANESTHESIA   For the first 24 hours: DO NOT Drive, Drink alcoholic beverages, or Make important decisions.  Beware of dizziness following anesthesia and while taking pain medication.    Plan to stay tonight within 1 hours drive of the hospital

## 2017-08-02 NOTE — ANESTHESIA PREPROCEDURE EVALUATION
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 Cardiovascular    Hypertension: poorly controlled              Exercise tolerance: >4 METS  Comments: History of DVT on coumadin   GI/Hepatic/Renal                Endo/Other        Morbid obesity     Other Findings              Physical Exam    Airway  Mallampati: II  TM Distance: 4 - 6 cm  Neck ROM: normal range of motion   Mouth opening: Normal     Cardiovascular  Regular rate and rhythm,  S1 and S2 normal,  no murmur, click, rub, or gallop             Dental  No notable dental hx       Pulmonary  Breath sounds clear to auscultation               Abdominal  GI exam deferred       Other Findings            Anesthetic Plan    ASA: 3  Anesthesia type: general          Induction: Intravenous  Anesthetic plan and risks discussed with: Patient

## 2017-08-02 NOTE — INTERVAL H&P NOTE
I have examined and spoken with the patient this morning. She has no new medical issues or complaints. She reports no new medicines since H&P. She again understands procedure and agrees to proceed.   Marisa Paez MD  12:02 PM  08/02/17

## 2017-08-16 PROBLEM — Z09 POSTOPERATIVE EXAMINATION: Status: ACTIVE | Noted: 2017-08-16

## 2017-08-16 PROBLEM — N84.1 CERVICAL POLYP: Status: RESOLVED | Noted: 2017-06-27 | Resolved: 2017-08-16

## 2017-08-16 PROBLEM — N84.0 ENDOMETRIAL POLYP: Status: ACTIVE | Noted: 2017-08-16

## 2017-10-13 ENCOUNTER — HOSPITAL ENCOUNTER (OUTPATIENT)
Dept: CT IMAGING | Age: 62
Discharge: HOME | End: 2017-10-13
Attending: OTOLARYNGOLOGY
Payer: MEDICARE

## 2017-10-13 DIAGNOSIS — J32.9 CHRONIC SINUSITIS, UNSPECIFIED LOCATION: ICD-10-CM

## 2017-10-13 DIAGNOSIS — J34.3 NASAL TURBINATE HYPERTROPHY: ICD-10-CM

## 2017-10-13 PROCEDURE — 70486 CT MAXILLOFACIAL W/O DYE: CPT

## 2017-11-13 ENCOUNTER — ANESTHESIA EVENT (OUTPATIENT)
Dept: SURGERY | Age: 62
End: 2017-11-13
Payer: MEDICARE

## 2017-11-13 ENCOUNTER — HOSPITAL ENCOUNTER (OUTPATIENT)
Dept: SURGERY | Age: 62
Discharge: HOME | End: 2017-11-13
Attending: OTOLARYNGOLOGY

## 2017-11-13 VITALS
WEIGHT: 239 LBS | RESPIRATION RATE: 18 BRPM | TEMPERATURE: 98 F | BODY MASS INDEX: 43.98 KG/M2 | SYSTOLIC BLOOD PRESSURE: 180 MMHG | HEART RATE: 70 BPM | DIASTOLIC BLOOD PRESSURE: 89 MMHG | OXYGEN SATURATION: 98 % | HEIGHT: 62 IN

## 2017-11-13 RX ORDER — IBUPROFEN 200 MG
200 TABLET ORAL
COMMUNITY
End: 2018-11-26 | Stop reason: CLARIF

## 2017-11-13 NOTE — PERIOP NOTES
Patient verified name, , and surgery as listed in [facility]. Type 1B surgery, PAT walk-in assessment complete. Labs per surgeon: No orders received at this time. Orders to be faxed from Dr. [place] office to [phone]. Labs per anesthesia protocol: None per anesthesia protocol. PT/INR DOS; order signed and held in [facility]. EKG: None per anesthesia protocol. Dr. April Peterson (anesthesia) did in-person assessment due to patient reporting history of being a difficult intubation. No new orders received, \"okay\" to proceed as scheduled. Patient inquired about beginning a Lovenox bridge. Dr. April Peterson reiterated, that it is at the surgeon's discretion on whether Coumadin needs to be held prior to surgery and if Lovenox bridge is needed. Patient verbalized understanding. Message left Dr. Clotilde Salinas, stating that coumadin will not interfere with anesthesia treatment. Per Dr. April Peterson, it is at 's discretion on whether to hold Coumadin. If medication is to be held, clearance to hold medication will need to be obtained from patient's PCP, Dr. Raimundo Rodriguez, and patient will need to be given instructions on when medication is to be held. Hibiclens and instructions given per hospital policy. Patient provided with and instructed on educational handouts including Guide to Surgery, Pain Management, Hand Hygiene, Blood Transfusion Education, and [facility] Brochure. Patient answered medical/surgical history questions at their best of ability. All prior to admission medications documented in [facility]. Original medication prescription bottle NOT visualized during patient appointment. Patient instructed to hold all vitamins 7 days prior to surgery and NSAIDS 5 days prior to surgery, patient verbalized understanding. Medications to be held: all vitamins/supplements and Advil.      Patient instructed to continue previous medications as prescribed prior to surgery and to take the following medications the day of surgery according to anesthesia guidelines with a small sip of water: Omeprazole and Allopurinol. Patient teach back successful and patient demonstrates knowledge of instructions.

## 2017-11-13 NOTE — ANESTHESIA PREPROCEDURE EVALUATION
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 Cardiovascular    Hypertension: poorly controlled              Exercise tolerance: >4 METS  Comments: History of DVT on coumadin   GI/Hepatic/Renal     GERD           Endo/Other        Morbid obesity and arthritis     Other Findings            Physical Exam    Airway  Mallampati: II  TM Distance: 4 - 6 cm  Neck ROM: normal range of motion   Mouth opening: Normal     Cardiovascular  Regular rate and rhythm,  S1 and S2 normal,  no murmur, click, rub, or gallop             Dental  No notable dental hx       Pulmonary  Breath sounds clear to auscultation               Abdominal  GI exam deferred       Other Findings            Anesthetic Plan    ASA: 3  Anesthesia type: general          Induction: Intravenous  Anesthetic plan and risks discussed with: Patient      Pt has been told she is difficult to intubate and she describes a \"turn\" in her trachea. She was easily intubated per the record for breast surgery in 2015. She states that she is usually hoarse for several weeks after GETA and has had to go to an ENT postop.

## 2017-11-13 NOTE — PERIOP NOTES
Patient request same pain medication that was prescribed after 2017 D&C, per patient \"it worked really well and I didn't have a reaction. \"

## 2017-11-16 NOTE — PERIOP NOTES
Per documentation in EMR- pt's PCP contacted and provided pre op instructions to pt re:anticoagulant. Per PCP (Dr. Lobo Landry): pt to stop coumadin 5 days prior to surgery. Begin lovenox 3 days before surgery, BID. Last dose lovenox 12-24 hr prior to surgery. Recheck INR prior to surgery. Complete letter from PCP placed on chart. PT/INR in EMR for DOS.

## 2017-11-20 ENCOUNTER — ANESTHESIA (OUTPATIENT)
Dept: SURGERY | Age: 62
End: 2017-11-20
Payer: MEDICARE

## 2017-11-20 ENCOUNTER — HOSPITAL ENCOUNTER (OUTPATIENT)
Age: 62
Setting detail: OUTPATIENT SURGERY
Discharge: HOME | End: 2017-11-20
Attending: OTOLARYNGOLOGY | Admitting: OTOLARYNGOLOGY
Payer: MEDICARE

## 2017-11-20 VITALS
RESPIRATION RATE: 18 BRPM | DIASTOLIC BLOOD PRESSURE: 77 MMHG | OXYGEN SATURATION: 94 % | SYSTOLIC BLOOD PRESSURE: 158 MMHG | HEART RATE: 83 BPM | TEMPERATURE: 98 F

## 2017-11-20 PROCEDURE — 74011000250 HC RX REV CODE- 250

## 2017-11-20 PROCEDURE — 77030008703 HC TU ET UNCUF COVD -A: Performed by: ANESTHESIOLOGY

## 2017-11-20 PROCEDURE — 76010000138 HC OR TIME 0.5 TO 1 HR: Performed by: OTOLARYNGOLOGY

## 2017-11-20 PROCEDURE — 74011250636 HC RX REV CODE- 250/636

## 2017-11-20 PROCEDURE — 74011000250 HC RX REV CODE- 250: Performed by: OTOLARYNGOLOGY

## 2017-11-20 PROCEDURE — 76210000020 HC REC RM PH II FIRST 0.5 HR: Performed by: OTOLARYNGOLOGY

## 2017-11-20 PROCEDURE — 77030008477 HC STYL SATN SLP COVD -A: Performed by: ANESTHESIOLOGY

## 2017-11-20 PROCEDURE — 76210000016 HC OR PH I REC 1 TO 1.5 HR: Performed by: OTOLARYNGOLOGY

## 2017-11-20 PROCEDURE — 77030018836 HC SOL IRR NACL ICUM -A: Performed by: OTOLARYNGOLOGY

## 2017-11-20 PROCEDURE — 77030006907 HC BLD SNUS SHV MEDT -C: Performed by: OTOLARYNGOLOGY

## 2017-11-20 PROCEDURE — 74011000250 HC RX REV CODE- 250: Performed by: ANESTHESIOLOGY

## 2017-11-20 PROCEDURE — 74011250636 HC RX REV CODE- 250/636: Performed by: ANESTHESIOLOGY

## 2017-11-20 PROCEDURE — 77030036727 HC DEV INFL BLN SNUS SE DISP ACCL -B: Performed by: OTOLARYNGOLOGY

## 2017-11-20 PROCEDURE — 74011250637 HC RX REV CODE- 250/637: Performed by: OTOLARYNGOLOGY

## 2017-11-20 PROCEDURE — 74011250637 HC RX REV CODE- 250/637: Performed by: ANESTHESIOLOGY

## 2017-11-20 PROCEDURE — 77030036884 HC CATH GD SPNPLS RELV TIP DISP KT ACCL -G1: Performed by: OTOLARYNGOLOGY

## 2017-11-20 PROCEDURE — 77030020782 HC GWN BAIR PAWS FLX 3M -B: Performed by: ANESTHESIOLOGY

## 2017-11-20 PROCEDURE — 76060000032 HC ANESTHESIA 0.5 TO 1 HR: Performed by: OTOLARYNGOLOGY

## 2017-11-20 RX ORDER — DEXAMETHASONE SODIUM PHOSPHATE 4 MG/ML
INJECTION, SOLUTION INTRA-ARTICULAR; INTRALESIONAL; INTRAMUSCULAR; INTRAVENOUS; SOFT TISSUE AS NEEDED
Status: DISCONTINUED | OUTPATIENT
Start: 2017-11-20 | End: 2017-11-20 | Stop reason: HOSPADM

## 2017-11-20 RX ORDER — OXYCODONE HYDROCHLORIDE 5 MG/1
5 TABLET ORAL
Status: DISCONTINUED | OUTPATIENT
Start: 2017-11-20 | End: 2017-11-20 | Stop reason: HOSPADM

## 2017-11-20 RX ORDER — MIDAZOLAM HYDROCHLORIDE 1 MG/ML
2 INJECTION, SOLUTION INTRAMUSCULAR; INTRAVENOUS
Status: DISCONTINUED | OUTPATIENT
Start: 2017-11-20 | End: 2017-11-20 | Stop reason: HOSPADM

## 2017-11-20 RX ORDER — LIDOCAINE HYDROCHLORIDE 20 MG/ML
INJECTION, SOLUTION EPIDURAL; INFILTRATION; INTRACAUDAL; PERINEURAL AS NEEDED
Status: DISCONTINUED | OUTPATIENT
Start: 2017-11-20 | End: 2017-11-20 | Stop reason: HOSPADM

## 2017-11-20 RX ORDER — HYDROMORPHONE HYDROCHLORIDE 2 MG/ML
0.5 INJECTION, SOLUTION INTRAMUSCULAR; INTRAVENOUS; SUBCUTANEOUS
Status: DISCONTINUED | OUTPATIENT
Start: 2017-11-20 | End: 2017-11-20 | Stop reason: HOSPADM

## 2017-11-20 RX ORDER — PROPOFOL 10 MG/ML
INJECTION, EMULSION INTRAVENOUS AS NEEDED
Status: DISCONTINUED | OUTPATIENT
Start: 2017-11-20 | End: 2017-11-20 | Stop reason: HOSPADM

## 2017-11-20 RX ORDER — MIDAZOLAM HYDROCHLORIDE 1 MG/ML
5 INJECTION, SOLUTION INTRAMUSCULAR; INTRAVENOUS ONCE
Status: DISCONTINUED | OUTPATIENT
Start: 2017-11-20 | End: 2017-11-20 | Stop reason: HOSPADM

## 2017-11-20 RX ORDER — LIDOCAINE HYDROCHLORIDE 10 MG/ML
0.1 INJECTION INFILTRATION; PERINEURAL AS NEEDED
Status: DISCONTINUED | OUTPATIENT
Start: 2017-11-20 | End: 2017-11-20 | Stop reason: HOSPADM

## 2017-11-20 RX ORDER — ENOXAPARIN SODIUM 150 MG/ML
120 INJECTION SUBCUTANEOUS 2 TIMES DAILY
COMMUNITY
End: 2018-02-22

## 2017-11-20 RX ORDER — ROCURONIUM BROMIDE 10 MG/ML
INJECTION, SOLUTION INTRAVENOUS AS NEEDED
Status: DISCONTINUED | OUTPATIENT
Start: 2017-11-20 | End: 2017-11-20 | Stop reason: HOSPADM

## 2017-11-20 RX ORDER — SUCCINYLCHOLINE CHLORIDE 20 MG/ML
INJECTION INTRAMUSCULAR; INTRAVENOUS AS NEEDED
Status: DISCONTINUED | OUTPATIENT
Start: 2017-11-20 | End: 2017-11-20 | Stop reason: HOSPADM

## 2017-11-20 RX ORDER — SODIUM CHLORIDE, SODIUM LACTATE, POTASSIUM CHLORIDE, CALCIUM CHLORIDE 600; 310; 30; 20 MG/100ML; MG/100ML; MG/100ML; MG/100ML
75 INJECTION, SOLUTION INTRAVENOUS CONTINUOUS
Status: DISCONTINUED | OUTPATIENT
Start: 2017-11-20 | End: 2017-11-20 | Stop reason: HOSPADM

## 2017-11-20 RX ORDER — FENTANYL CITRATE 50 UG/ML
100 INJECTION, SOLUTION INTRAMUSCULAR; INTRAVENOUS ONCE
Status: DISCONTINUED | OUTPATIENT
Start: 2017-11-20 | End: 2017-11-20 | Stop reason: HOSPADM

## 2017-11-20 RX ORDER — HYDRALAZINE HYDROCHLORIDE 20 MG/ML
10 INJECTION INTRAMUSCULAR; INTRAVENOUS ONCE
Status: COMPLETED | OUTPATIENT
Start: 2017-11-20 | End: 2017-11-20

## 2017-11-20 RX ORDER — FENTANYL CITRATE 50 UG/ML
INJECTION, SOLUTION INTRAMUSCULAR; INTRAVENOUS AS NEEDED
Status: DISCONTINUED | OUTPATIENT
Start: 2017-11-20 | End: 2017-11-20 | Stop reason: HOSPADM

## 2017-11-20 RX ORDER — FAMOTIDINE 20 MG/1
20 TABLET, FILM COATED ORAL ONCE
Status: COMPLETED | OUTPATIENT
Start: 2017-11-20 | End: 2017-11-20

## 2017-11-20 RX ORDER — ONDANSETRON 2 MG/ML
INJECTION INTRAMUSCULAR; INTRAVENOUS AS NEEDED
Status: DISCONTINUED | OUTPATIENT
Start: 2017-11-20 | End: 2017-11-20 | Stop reason: HOSPADM

## 2017-11-20 RX ORDER — LIDOCAINE HYDROCHLORIDE AND EPINEPHRINE 10; 10 MG/ML; UG/ML
INJECTION, SOLUTION INFILTRATION; PERINEURAL AS NEEDED
Status: DISCONTINUED | OUTPATIENT
Start: 2017-11-20 | End: 2017-11-20 | Stop reason: HOSPADM

## 2017-11-20 RX ORDER — OXYMETAZOLINE HCL 0.05 %
SPRAY, NON-AEROSOL (ML) NASAL AS NEEDED
Status: DISCONTINUED | OUTPATIENT
Start: 2017-11-20 | End: 2017-11-20 | Stop reason: HOSPADM

## 2017-11-20 RX ORDER — MIDAZOLAM HYDROCHLORIDE 1 MG/ML
2 INJECTION, SOLUTION INTRAMUSCULAR; INTRAVENOUS ONCE
Status: DISCONTINUED | OUTPATIENT
Start: 2017-11-20 | End: 2017-11-20 | Stop reason: HOSPADM

## 2017-11-20 RX ORDER — HYDROCODONE BITARTRATE AND ACETAMINOPHEN 5; 325 MG/1; MG/1
2 TABLET ORAL AS NEEDED
Status: DISCONTINUED | OUTPATIENT
Start: 2017-11-20 | End: 2017-11-20 | Stop reason: HOSPADM

## 2017-11-20 RX ADMIN — MIDAZOLAM HYDROCHLORIDE 2 MG: 1 INJECTION, SOLUTION INTRAMUSCULAR; INTRAVENOUS at 11:43

## 2017-11-20 RX ADMIN — FAMOTIDINE 20 MG: 20 TABLET, FILM COATED ORAL at 11:43

## 2017-11-20 RX ADMIN — LIDOCAINE HYDROCHLORIDE 0.1 ML: 10 INJECTION, SOLUTION INFILTRATION; PERINEURAL at 11:43

## 2017-11-20 RX ADMIN — ONDANSETRON 4 MG: 2 INJECTION INTRAMUSCULAR; INTRAVENOUS at 14:49

## 2017-11-20 RX ADMIN — HYDROMORPHONE HYDROCHLORIDE 0.5 MG: 2 INJECTION, SOLUTION INTRAMUSCULAR; INTRAVENOUS; SUBCUTANEOUS at 16:02

## 2017-11-20 RX ADMIN — HYDROMORPHONE HYDROCHLORIDE 0.5 MG: 2 INJECTION, SOLUTION INTRAMUSCULAR; INTRAVENOUS; SUBCUTANEOUS at 15:18

## 2017-11-20 RX ADMIN — SODIUM CHLORIDE, SODIUM LACTATE, POTASSIUM CHLORIDE, AND CALCIUM CHLORIDE: 600; 310; 30; 20 INJECTION, SOLUTION INTRAVENOUS at 14:36

## 2017-11-20 RX ADMIN — SODIUM CHLORIDE, SODIUM LACTATE, POTASSIUM CHLORIDE, AND CALCIUM CHLORIDE 75 ML/HR: 600; 310; 30; 20 INJECTION, SOLUTION INTRAVENOUS at 11:00

## 2017-11-20 RX ADMIN — MIDAZOLAM HYDROCHLORIDE 2 MG: 1 INJECTION, SOLUTION INTRAMUSCULAR; INTRAVENOUS at 14:02

## 2017-11-20 RX ADMIN — ROCURONIUM BROMIDE 5 MG: 10 INJECTION, SOLUTION INTRAVENOUS at 14:39

## 2017-11-20 RX ADMIN — HYDRALAZINE HYDROCHLORIDE: 20 INJECTION INTRAMUSCULAR; INTRAVENOUS at 15:44

## 2017-11-20 RX ADMIN — SODIUM CHLORIDE 6.25 MG: 9 INJECTION INTRAMUSCULAR; INTRAVENOUS; SUBCUTANEOUS at 15:25

## 2017-11-20 RX ADMIN — HYDROMORPHONE HYDROCHLORIDE 0.5 MG: 2 INJECTION, SOLUTION INTRAMUSCULAR; INTRAVENOUS; SUBCUTANEOUS at 15:39

## 2017-11-20 RX ADMIN — FENTANYL CITRATE 100 MCG: 50 INJECTION, SOLUTION INTRAMUSCULAR; INTRAVENOUS at 14:38

## 2017-11-20 RX ADMIN — PROPOFOL 200 MG: 10 INJECTION, EMULSION INTRAVENOUS at 14:40

## 2017-11-20 RX ADMIN — SUCCINYLCHOLINE CHLORIDE 160 MG: 20 INJECTION INTRAMUSCULAR; INTRAVENOUS at 14:40

## 2017-11-20 RX ADMIN — DEXAMETHASONE SODIUM PHOSPHATE 10 MG: 4 INJECTION, SOLUTION INTRA-ARTICULAR; INTRALESIONAL; INTRAMUSCULAR; INTRAVENOUS; SOFT TISSUE at 14:49

## 2017-11-20 RX ADMIN — SODIUM CHLORIDE, SODIUM LACTATE, POTASSIUM CHLORIDE, AND CALCIUM CHLORIDE: 600; 310; 30; 20 INJECTION, SOLUTION INTRAVENOUS at 15:03

## 2017-11-20 RX ADMIN — LIDOCAINE HYDROCHLORIDE 40 MG: 20 INJECTION, SOLUTION EPIDURAL; INFILTRATION; INTRACAUDAL; PERINEURAL at 14:40

## 2017-11-20 NOTE — IP AVS SNAPSHOT
[address] [facility], [place] 
 
 
 [facility] [id number] 
[phone] Patient: Lilliana Metzger MRN: [id number] About your hospitalization You were admitted on:  November 20, 2017 You last received care in the:  [facility] PACU You were discharged on:  November 20, 2017 Why you were hospitalized Your primary diagnosis was:  Not on File Things You Need To Do (next 8 weeks) Schedule an appointment with Mike Terrell DO as soon as possible for a visit today CALL TO SCHEDULE FOLLOW UP APPOINTMENT IF NOT ALREADY SCHEDULED Phone:  [phone] Where:  [address], [facility] [address] Follow up with Cliff Reynolds MD  
  
Phone:  [phone] Where:  [facility] Wednesday Nov 29, 2017 POST OP with Mike Terrell DO at 10:15 AM  
Where:  [address] [facility] DIV[facility] ENT) Friday Dec 01, 2017 New Patient with Claudette Greaves, MD at 10:30 AM  
Where: [facility] Hematology and Oncology [facility]) Thursday Dec 07, 2017 LAB with MTV PM LAB/RAD at  9:15 AM  
Where:  Mitesh Nu[facility]ez ([address] [place] [address]) New Patient with Ba Cleveland MD at  3:94 PM  
Where:  [facility] Discharge Orders None A check dea indicates which time of day the medication should be taken. My Medications ASK your physician about these medications Instructions Each Dose to Equal  
 Morning Noon Evening Bedtime ADVIL 200 mg tablet Generic drug:  ibuprofen Your last dose was: Your next dose is: Take  by mouth. allopurinol 300 mg tablet Commonly known as:  Amy Mann Your last dose was: Your next dose is: Take 1 Tab by mouth daily.   
 300 mg  
    
   
   
   
  
 colchicine 0.6 mg tablet Commonly known as:  COLCRYS Your last dose was: Your next dose is:    
   
   
 Acute gout: 1.2 mg (2 tabs) PO at signs of attack then 0.6 mg (1 tab) 1 h later. No more than 3 tablets in the first 24 h. Continue taking 1 tablet daily. cyanocobalamin 1,000 mcg tablet Your last dose was: Your next dose is: Take 1,500 mcg by mouth daily. 1500 mcg  
    
   
   
   
  
 gabapentin 300 mg capsule Commonly known as:  NEURONTIN Your last dose was: Your next dose is: Take 1 Cap by mouth three (3) times daily. Indications: NEUROPATHIC PAIN  
 300 mg  
    
   
   
   
  
 lisinopril 20 mg tablet Commonly known as:  Leslee Segal Your last dose was: Your next dose is: Take 1 Tab by mouth daily. Indications: hypertension 20 mg  
    
   
   
   
  
 LOVENOX 120 mg/0.8 mL injection Generic drug:  enoxaparin Your last dose was: Your next dose is:    
   
   
 120 mg by SubCUTAneous route two (2) times a day. Indications: PULMONARY THROMBOEMBOLISM PREVENTION  
 120 mg  
    
   
   
   
  
 OMEGA 3 FISH OIL PO Your last dose was: Your next dose is: Take 2,100 mg by mouth daily. 2100 mg cap--1 cap po qd  
 2100 mg  
    
   
   
   
  
 omeprazole 40 mg capsule Commonly known as:  PRILOSEC Your last dose was: Your next dose is: Take 1 Cap by mouth two (2) times a day. Indications: gastroesophageal reflux disease 40 mg  
    
   
   
   
  
 VITAMIN D3 PO Your last dose was: Your next dose is: Take 10,000 Units by mouth daily. [address] Units  
    
   
   
   
  
 warfarin 6 mg tablet Commonly known as:  COUMADIN Your last dose was: Your next dose is: Take 1 Tab by mouth daily. 6 mg  
    
   
   
   
  
 zolpidem 10 mg tablet Commonly known as:  AMBIEN Your last dose was: Your next dose is: Take 1 Tab by mouth nightly as needed for Sleep. Max Daily Amount: 10 mg.  
 10 mg Discharge Instructions INSTRUCTIONS FOLLOWING SINUS SURGERY 
 
ACTIVITY  Bathe or shower as directed by your doctor.  Avoid strenuous work and becoming overheated. Activity as directed by your doctor  Avoid bending over. DIET  Clear, cool liquids the first day; then soft diet the second day  Advance to regular diet on third day, unless your doctor orders otherwise.  No milk products or foods with red color dyes  If nausea and vomiting continues, call your doctor. PAIN 
 Take pain medication as directed by your doctor.  Call your doctor if pain is NOT relieved by medication.  DO NOT take aspirin or blood thinners until directed by your doctor. FOLLOW-UP PHONE CALLS  Calls will be made by nursing staff  If you have any problems, call your doctor as needed. CALL YOUR DOCTOR IF  Bleeding is expected the first few days and should gradually clear.  Temperature of 10 1°F or above  Green or yellow discharge from nose  Stiff neck, changes in vision or mental status, confusion, or excessive drowsiness AFTER ANESTHESIA  For the first 24 hours: DO NOT Drive, Drink alcoholic beverages, or Make important decisions.  Be aware of dizziness following anesthesia and while taking pain medication. Introducing [facility]! Dear Vahe DUFF: 
Thank you for requesting a [employer] account. Our records indicate that you already have an active [employer] account. You can access your account anytime at [email or web address] Did you know that you can access your hospital and ER discharge instructions at any time in [employer]? You can also review all of your test results from your hospital stay or ER visit. Additional Information If you have questions, please visit the Frequently Asked Questions section of the MyChart website at [email or web address] Remember, MyChart is NOT to be used for urgent needs. For medical emergencies, dial 911. Now available from your iPhone and Android! Providers Seen During Your Hospitalization Provider Specialty Primary office phone Osito Tariq DO Otolaryngology [phone] Your Primary Care Physician (PCP) Primary Care Physician Office Phone Office Fax [facility], [address] [place] [phone] You are allergic to the following Allergen Reactions Clindamycin Anaphylaxis Iodinated Contrast- Oral And Iv Dye Anaphylaxis Ultram (Tramadol) Anaphylaxis Aloe Vera Hives Codeine Nausea and Vomiting Erythromycin Other (comments) Herpes infection; pt states from tip of tongue down to digestive system. Heparin Analogues Hives Pt states ok to take warfarin. Lanolin Hives Other Medication Hives Allergic to most antibiotics Vigamox (Moxifloxacin) Itching Aspartame Nausea and Vomiting Augmentin (Amoxicillin-Pot Clavulanate) Itching Cefzil (Cefprozil) Rash Morphine Nausea and Vomiting  
    
 Sulfa (Sulfonamide Antibiotics) Itching Varicella Virus Vacc Live (Pf) Rash Shingle vaccine Recent Documentation OB Status Smoking Status Postmenopausal Never Smoker Emergency Contacts Name Discharge Info Relation Home Work Mobile Bigg Amaral DISCHARGE CAREGIVER [3] Son [22] [phone] Sander Reinoso  Child [2] [phone] Patient Belongings The following personal items are in your possession at time of discharge: 
                             
 
  
  
 Please provide this summary of care documentation to your next provider. Signatures-by signing, you are acknowledging that this After Visit Summary has been reviewed with you and you have received a copy. Patient Signature:  ____________________________________________________________ Date:  ____________________________________________________________  
  
Hemal Shae Provider Signature:  ____________________________________________________________ Date:  ____________________________________________________________

## 2017-11-20 NOTE — DISCHARGE INSTRUCTIONS
INSTRUCTIONS FOLLOWING SINUS SURGERY    ACTIVITY   Bathe or shower as directed by your doctor.  Avoid strenuous work and becoming overheated. Activity as directed by your doctor   Avoid bending over. DIET   Clear, cool liquids the first day; then soft diet the second day   Advance to regular diet on third day, unless your doctor orders otherwise.  No milk products or foods with red color dyes   If nausea and vomiting continues, call your doctor. PAIN   Take pain medication as directed by your doctor.  Call your doctor if pain is NOT relieved by medication.  DO NOT take aspirin or blood thinners until directed by your doctor. FOLLOW-UP PHONE [address] will be made by nursing staff   If you have any problems, call your doctor as needed. CALL YOUR DOCTOR IF   Bleeding is expected the first few days and should gradually clear.  Temperature of 10 1°F or above   Green or yellow discharge from nose   Stiff neck, changes in vision or mental status, confusion, or excessive drowsiness    AFTER ANESTHESIA   For the first 24 hours: DO NOT Drive, Drink alcoholic beverages, or Make important decisions.  Be aware of dizziness following anesthesia and while taking pain medication.

## 2017-11-20 NOTE — ANESTHESIA POSTPROCEDURE EVALUATION
Post-Anesthesia Evaluation and Assessment    Patient: Erlinda Bennett MRN: [id number]  SSN: [id number]    YOB: 1955  Age: 57 y.o. Sex: female       Cardiovascular Function/Vital Signs  Visit Vitals    /77    Pulse 83    Temp 36.7 °C (98 °F)    Resp 18    SpO2 94%       Patient is status post general anesthesia for Procedure(s):  BILATERAL MAXILLARY BALLOON SINUPLASTY   SUBMUCOUS RESECTION INFERIOR TURBINATES. Nausea/Vomiting: None    Postoperative hydration reviewed and adequate. Pain:  Pain Scale 1: Visual (11/20/17 1602)  Pain Intensity 1: 7 (11/20/17 1602)   Managed    Neurological Status:   Neuro (WDL): Within Defined Limits (11/20/17 1551)  Neuro  Neurologic State: Drowsy (11/20/17 1551)  Orientation Level: Oriented to person;Oriented to place (11/20/17 1551)  LUE Motor Response: Purposeful (11/20/17 1551)  LLE Motor Response: Purposeful (11/20/17 1551)  RUE Motor Response: Purposeful (11/20/17 1551)  RLE Motor Response: Purposeful (11/20/17 1551)   At baseline    Mental Status and Level of Consciousness: Arousable    Pulmonary Status:   O2 Device: Room air (11/20/17 1622)   Adequate oxygenation and airway patent    Complications related to anesthesia: None    Post-anesthesia assessment completed.  No concerns    Signed By: Homero Del Rio MD     November 20, 2017

## 2017-11-21 NOTE — OP NOTES
[address]   OPERATIVE REPORT       Name:  Flor Gaspar   MR#:  [id number]   :  1955   Account #:  [de-identified]   Date of Adm:  2017       PREOPERATIVE DIAGNOSES     1. Chronic sinusitis. 2. Deviated nasal septum. 3. Inferior turbinate hypertrophy. 4. Nasal obstruction. POSTOPERATIVE DIAGNOSES     1. Chronic sinusitis. 2. Deviated nasal septum. 3. Inferior turbinate hypertrophy. 4. Nasal obstruction. PROCEDURE   1. Bilateral submucosal resection of the inferior turbinates. 2. Bilateral maxillary balloon sinuplasty with removal of   contents. SURGEON: Maria E Landry DO.    ASSISTANT: None. ANESTHESIA: General.    COMPLICATIONS: None. BLOOD LOSS: Less than 20 mL. HISTORY: A 79-year-old female who came to see me in the office   with a complaint of chronic sinus infections, nasal congestion   and obstruction. It has been there for years, slowly getting   worse, it is getting to a point where it is actually very   bothersome to her. She gets burning inside the nose, and it   feels raw inside the nose on both sides. She has some swelling   of the nose and around the eye. The Sudafed does help open the   nose, but it made her heart race, so she had to stop it. She has   been on nasal steroid sprays and antihistamines in the past as   well with no overall improvement. She has a lot concerns with   flights due to the extra pressure. I did check a CT scan which   showed a deviated nasal septum, inferior turbinate hypertrophy,   and narrowing of the ostiomeatal complexes bilaterally. There   was some mucosal thickening in the right maxillary sinus. So   since she has failed medical therapy, it was my recommendation   that she undergo an inferior turbinate reduction, and bilateral   maxillary balloon sinuplasty with possible biopsy. The procedure   risks and benefits were discussed with her in the office.  All   questions were answered and she is agreeable to the surgery. SURGERY ITSELF: The patient was identified in the preoperative   waiting area. She was taken back to the operating room where she   underwent general anesthesia. Approximately 2 mL of 1% lidocaine   with epinephrine were injected into the inferior middle   turbinates bilaterally. Afrin-soaked pledgets were placed in   each side of the nose and left there for a few minutes. These   were then removed. A knife was used to make a small stab   incision in the anterior portion of the inferior turbinates. Colletta Slim was used to create a small pocket between the bone of the   inferior turbinate and the mucosa medially and then a   microdebrider with a turbinate blade was used to reduce the   prominent bone and tissue of the inferior turbinates   bilaterally. A Boies elevator was used to medialize and   lateralize the inferior turbinates, giving the patient a widely   patent nasal airway. The 0 degree scope along with the balloon   introducer was placed in the left side of the nose first, but   did the same procedure on the right side. I was able to feed the   guidewire and the balloon into a narrowed opening. I was then   able to feed the balloon over the guidewire. The balloon was   inflated to a pressure of 12, deflated. The sinus was irrigated   using 120 mL of saline. Reinspection revealed thickened mucosa   just inside the sinus opening which was removed and the sinuses   appeared to be open and clear. So once both sinuses were done,   some blood was suctioned from the nose and nasopharynx and then   the patient was awakened and she was taken to the postop   recovery room in stable condition.         DO BRYAN Caceres / Twan    D:  11/20/2017   15:09   T:  11/21/2017   11:15   Job #:  [id number]

## 2017-12-01 PROBLEM — E66.01 OBESITY, MORBID (HCC): Status: ACTIVE | Noted: 2017-12-01

## 2017-12-18 ENCOUNTER — HOSPITAL ENCOUNTER (OUTPATIENT)
Dept: ULTRASOUND IMAGING | Age: 62
Discharge: HOME | End: 2017-12-18
Attending: OBSTETRICS & GYNECOLOGY
Payer: MEDICARE

## 2017-12-18 DIAGNOSIS — N84.0 ENDOMETRIAL POLYP: ICD-10-CM

## 2017-12-18 DIAGNOSIS — R10.2 PELVIC PAIN IN FEMALE: ICD-10-CM

## 2017-12-18 DIAGNOSIS — Z98.890 HX OF COLONOSCOPY: ICD-10-CM

## 2017-12-18 PROCEDURE — 76830 TRANSVAGINAL US NON-OB: CPT

## 2017-12-29 PROBLEM — N85.8 UTERINE MASS: Status: ACTIVE | Noted: 2017-08-16

## 2018-02-21 ENCOUNTER — HOSPITAL ENCOUNTER (OUTPATIENT)
Dept: LAB | Age: 63
Discharge: HOME | End: 2018-02-21
Payer: MEDICARE

## 2018-02-21 DIAGNOSIS — N95.0 POST-MENOPAUSAL BLEEDING: ICD-10-CM

## 2018-02-21 DIAGNOSIS — Z86.718 HISTORY OF DVT OF LOWER EXTREMITY: ICD-10-CM

## 2018-02-21 LAB
ANION GAP SERPL CALC-SCNC: 6 MMOL/L (ref 7–16)
BASOPHILS # BLD: 0.1 K/UL (ref 0–0.2)
BASOPHILS NFR BLD: 1 % (ref 0–2)
BUN SERPL-MCNC: 22 MG/DL (ref 8–23)
CALCIUM SERPL-MCNC: 9.2 MG/DL (ref 8.3–10.4)
CHLORIDE SERPL-SCNC: 108 MMOL/L (ref 98–107)
CO2 SERPL-SCNC: 25 MMOL/L (ref 21–32)
CREAT SERPL-MCNC: 1.09 MG/DL (ref 0.6–1)
DIFFERENTIAL METHOD BLD: ABNORMAL
EOSINOPHIL # BLD: 0.2 K/UL (ref 0–0.8)
EOSINOPHIL NFR BLD: 2 % (ref 0.5–7.8)
ERYTHROCYTE [DISTWIDTH] IN BLOOD BY AUTOMATED COUNT: 13.2 % (ref 11.9–14.6)
GLUCOSE SERPL-MCNC: 90 MG/DL (ref 65–100)
HCT VFR BLD AUTO: 44.5 % (ref 35.8–46.3)
HGB BLD-MCNC: 15.7 G/DL (ref 11.7–15.4)
INR PPP: 1.1
LYMPHOCYTES # BLD: 3.4 K/UL (ref 0.5–4.6)
LYMPHOCYTES NFR BLD: 37 % (ref 13–44)
MCH RBC QN AUTO: 31.3 PG (ref 26.1–32.9)
MCHC RBC AUTO-ENTMCNC: 35.3 G/DL (ref 31.4–35)
MCV RBC AUTO: 88.8 FL (ref 79.6–97.8)
MONOCYTES # BLD: 0.8 K/UL (ref 0.1–1.3)
MONOCYTES NFR BLD: 8 % (ref 4–12)
NEUTS SEG # BLD: 4.8 K/UL (ref 1.7–8.2)
NEUTS SEG NFR BLD: 53 % (ref 43–78)
NRBC # BLD: 0 K/UL (ref 0–0.2)
PLATELET # BLD AUTO: 252 K/UL (ref 150–450)
PMV BLD AUTO: 9.9 FL (ref 10.8–14.1)
POTASSIUM SERPL-SCNC: 3.9 MMOL/L (ref 3.5–5.1)
PROTHROMBIN TIME: 14 SEC (ref 11.5–14.5)
RBC # BLD AUTO: 5.01 M/UL (ref 4.05–5.25)
SODIUM SERPL-SCNC: 139 MMOL/L (ref 136–145)
WBC # BLD AUTO: 9.1 K/UL (ref 4.3–11.1)

## 2018-02-21 PROCEDURE — 36415 COLL VENOUS BLD VENIPUNCTURE: CPT | Performed by: NURSE PRACTITIONER

## 2018-02-21 PROCEDURE — 80048 BASIC METABOLIC PNL TOTAL CA: CPT | Performed by: NURSE PRACTITIONER

## 2018-02-21 PROCEDURE — 85025 COMPLETE CBC W/AUTO DIFF WBC: CPT | Performed by: NURSE PRACTITIONER

## 2018-02-21 PROCEDURE — 85610 PROTHROMBIN TIME: CPT | Performed by: NURSE PRACTITIONER

## 2018-02-22 ENCOUNTER — HOSPITAL ENCOUNTER (OUTPATIENT)
Dept: SURGERY | Age: 63
Discharge: HOME | End: 2018-02-22

## 2018-02-22 VITALS
OXYGEN SATURATION: 97 % | HEIGHT: 61 IN | TEMPERATURE: 98.1 F | BODY MASS INDEX: 44.84 KG/M2 | DIASTOLIC BLOOD PRESSURE: 87 MMHG | HEART RATE: 80 BPM | RESPIRATION RATE: 20 BRPM | SYSTOLIC BLOOD PRESSURE: 154 MMHG | WEIGHT: 237.5 LBS

## 2018-02-22 NOTE — PERIOP NOTES
Patient verified name, , and surgery as listed in [facility]. Patient provided medical/health information and PTA medications to the best of their ability. TYPE  CASE:2  Orders per surgeon: not Received. Labs per surgeon:unknown. Results: cbc, bmp and pt/inr in [facility]. Labs per anesthesia protocol: pt/inr on the dos. Type and screen on the dos. EKG  :  Recent ekg to chart. Coumadin clearance in h/p    Patient provided with and instructed on education handouts including Guide to Surgery, blood transfusions, pain management, and hand hygiene for the family and community, and [facility] brochure.     hibiclens and instructions given per hospital policy. Instructed patient to continue previous medications as prescribed prior to surgery unless otherwise directed and to take the following medications the day of surgery according to anesthesia guidelines : gabapentin, omeprazole . Instructed patient to hold  the following medications: vitamin d, vitamin b, advil, fish oil, coumadin (18). Original medication prescription bottles not visualized during patient appointment. Patient teach back successful and patient demonstrates knowledge of instruction.

## 2018-02-23 RX ORDER — SODIUM CHLORIDE 0.9 % (FLUSH) 0.9 %
5-10 SYRINGE (ML) INJECTION AS NEEDED
Status: CANCELLED | OUTPATIENT
Start: 2018-02-23

## 2018-02-23 RX ORDER — METRONIDAZOLE 500 MG/100ML
500 INJECTION, SOLUTION INTRAVENOUS ONCE
Status: CANCELLED | OUTPATIENT
Start: 2018-02-27 | End: 2018-02-27

## 2018-02-23 RX ORDER — SODIUM CHLORIDE 0.9 % (FLUSH) 0.9 %
5-10 SYRINGE (ML) INJECTION EVERY 8 HOURS
Status: CANCELLED | OUTPATIENT
Start: 2018-02-23

## 2018-02-23 RX ORDER — HEPARIN SODIUM 5000 [USP'U]/ML
5000 INJECTION, SOLUTION INTRAVENOUS; SUBCUTANEOUS ONCE
Status: CANCELLED | OUTPATIENT
Start: 2018-02-27 | End: 2018-02-27

## 2018-02-26 ENCOUNTER — ANESTHESIA EVENT (OUTPATIENT)
Dept: SURGERY | Age: 63
End: 2018-02-26
Payer: MEDICARE

## 2018-02-27 ENCOUNTER — HOSPITAL ENCOUNTER (OUTPATIENT)
Age: 63
Setting detail: OBSERVATION
Discharge: HOME | End: 2018-02-28
Attending: OBSTETRICS & GYNECOLOGY | Admitting: OBSTETRICS & GYNECOLOGY
Payer: MEDICARE

## 2018-02-27 ENCOUNTER — ANESTHESIA (OUTPATIENT)
Dept: SURGERY | Age: 63
End: 2018-02-27
Payer: MEDICARE

## 2018-02-27 DIAGNOSIS — G89.18 POST-OP PAIN: Primary | ICD-10-CM

## 2018-02-27 PROBLEM — D25.9 LEIOMYOMA OF BODY OF UTERUS: Status: ACTIVE | Noted: 2018-02-27

## 2018-02-27 LAB
ABO + RH BLD: NORMAL
BLOOD GROUP ANTIBODIES SERPL: NORMAL
HCT VFR BLD AUTO: 42.5 % (ref 35.8–46.3)
HGB BLD-MCNC: 14.9 G/DL (ref 11.7–15.4)
INR BLD: 1.1 (ref 0.9–1.2)
PT BLD: 13.1 SECS (ref 9.6–11.6)
SPECIMEN EXP DATE BLD: NORMAL

## 2018-02-27 PROCEDURE — 74011250637 HC RX REV CODE- 250/637: Performed by: OBSTETRICS & GYNECOLOGY

## 2018-02-27 PROCEDURE — 77030016151 HC PROTCTR LNS DFOG COVD -B: Performed by: OBSTETRICS & GYNECOLOGY

## 2018-02-27 PROCEDURE — 77030034696 HC CATH URETH FOL 2W BARD -A: Performed by: OBSTETRICS & GYNECOLOGY

## 2018-02-27 PROCEDURE — 77030020703 HC SEAL CANN DISP INTU -B: Performed by: OBSTETRICS & GYNECOLOGY

## 2018-02-27 PROCEDURE — 77030027743 HC APPL F/HEMSTAT BARD -B: Performed by: OBSTETRICS & GYNECOLOGY

## 2018-02-27 PROCEDURE — 77010033678 HC OXYGEN DAILY

## 2018-02-27 PROCEDURE — 77030008703 HC TU ET UNCUF COVD -A: Performed by: ANESTHESIOLOGY

## 2018-02-27 PROCEDURE — 77030008477 HC STYL SATN SLP COVD -A: Performed by: ANESTHESIOLOGY

## 2018-02-27 PROCEDURE — 74011000250 HC RX REV CODE- 250: Performed by: OBSTETRICS & GYNECOLOGY

## 2018-02-27 PROCEDURE — 99218 HC RM OBSERVATION: CPT

## 2018-02-27 PROCEDURE — 77030018014 HC RETRCTR ENDOSC1 COVD -C: Performed by: OBSTETRICS & GYNECOLOGY

## 2018-02-27 PROCEDURE — 74011250636 HC RX REV CODE- 250/636

## 2018-02-27 PROCEDURE — 77030008756 HC TU IRR SUC STRY -B: Performed by: OBSTETRICS & GYNECOLOGY

## 2018-02-27 PROCEDURE — 77030035277 HC OBTRTR BLDELSS DISP INTU -B: Performed by: OBSTETRICS & GYNECOLOGY

## 2018-02-27 PROCEDURE — 77030033269 HC SLV COMPR SCD KNE2 CARD -B: Performed by: OBSTETRICS & GYNECOLOGY

## 2018-02-27 PROCEDURE — 77030027744 HC PWDR HEMSTAT ARISTA ABSRB 5GM BARD -D: Performed by: OBSTETRICS & GYNECOLOGY

## 2018-02-27 PROCEDURE — 88112 CYTOPATH CELL ENHANCE TECH: CPT | Performed by: OBSTETRICS & GYNECOLOGY

## 2018-02-27 PROCEDURE — 77030008522 HC TBNG INSUF LAPRO STRY -B: Performed by: OBSTETRICS & GYNECOLOGY

## 2018-02-27 PROCEDURE — 76010000876 HC OR TIME 2 TO 2.5HR INTENSV - TIER 2: Performed by: OBSTETRICS & GYNECOLOGY

## 2018-02-27 PROCEDURE — 74011250637 HC RX REV CODE- 250/637: Performed by: ANESTHESIOLOGY

## 2018-02-27 PROCEDURE — 77030011640 HC PAD GRND REM COVD -A: Performed by: OBSTETRICS & GYNECOLOGY

## 2018-02-27 PROCEDURE — 76210000016 HC OR PH I REC 1 TO 1.5 HR: Performed by: OBSTETRICS & GYNECOLOGY

## 2018-02-27 PROCEDURE — 77030003028 HC SUT VCRL J&J -A: Performed by: OBSTETRICS & GYNECOLOGY

## 2018-02-27 PROCEDURE — 74011250636 HC RX REV CODE- 250/636: Performed by: NURSE PRACTITIONER

## 2018-02-27 PROCEDURE — 85610 PROTHROMBIN TIME: CPT

## 2018-02-27 PROCEDURE — 85018 HEMOGLOBIN: CPT | Performed by: OBSTETRICS & GYNECOLOGY

## 2018-02-27 PROCEDURE — 74011000250 HC RX REV CODE- 250: Performed by: NURSE PRACTITIONER

## 2018-02-27 PROCEDURE — 74011000258 HC RX REV CODE- 258: Performed by: OBSTETRICS & GYNECOLOGY

## 2018-02-27 PROCEDURE — 76060000035 HC ANESTHESIA 2 TO 2.5 HR: Performed by: OBSTETRICS & GYNECOLOGY

## 2018-02-27 PROCEDURE — 88307 TISSUE EXAM BY PATHOLOGIST: CPT | Performed by: OBSTETRICS & GYNECOLOGY

## 2018-02-27 PROCEDURE — 77030018846 HC SOL IRR STRL H20 ICUM -A: Performed by: OBSTETRICS & GYNECOLOGY

## 2018-02-27 PROCEDURE — 77030035048 HC TRCR ENDOSC OPTCL COVD -B: Performed by: OBSTETRICS & GYNECOLOGY

## 2018-02-27 PROCEDURE — 77030010545: Performed by: OBSTETRICS & GYNECOLOGY

## 2018-02-27 PROCEDURE — 77030010507 HC ADH SKN DERMBND J&J -B: Performed by: OBSTETRICS & GYNECOLOGY

## 2018-02-27 PROCEDURE — 77030020782 HC GWN BAIR PAWS FLX 3M -B: Performed by: ANESTHESIOLOGY

## 2018-02-27 PROCEDURE — 77030031139 HC SUT VCRL2 J&J -A: Performed by: OBSTETRICS & GYNECOLOGY

## 2018-02-27 PROCEDURE — 77030022704 HC SUT VLOC COVD -B: Performed by: OBSTETRICS & GYNECOLOGY

## 2018-02-27 PROCEDURE — 86850 RBC ANTIBODY SCREEN: CPT | Performed by: NURSE PRACTITIONER

## 2018-02-27 PROCEDURE — 77030021158 HC TRCR BLN GELPRT AMR -B: Performed by: OBSTETRICS & GYNECOLOGY

## 2018-02-27 PROCEDURE — 77030019908 HC STETH ESOPH SIMS -A: Performed by: ANESTHESIOLOGY

## 2018-02-27 PROCEDURE — 77030018778 HC MANIP UTER VCAR CNMD -B: Performed by: OBSTETRICS & GYNECOLOGY

## 2018-02-27 PROCEDURE — 74011000250 HC RX REV CODE- 250

## 2018-02-27 PROCEDURE — 74011250636 HC RX REV CODE- 250/636: Performed by: ANESTHESIOLOGY

## 2018-02-27 PROCEDURE — 74011250636 HC RX REV CODE- 250/636: Performed by: OBSTETRICS & GYNECOLOGY

## 2018-02-27 RX ORDER — ZOLPIDEM TARTRATE 5 MG/1
5 TABLET ORAL
Status: DISCONTINUED | OUTPATIENT
Start: 2018-02-27 | End: 2018-02-28 | Stop reason: HOSPADM

## 2018-02-27 RX ORDER — ALBUTEROL SULFATE 0.83 MG/ML
2.5 SOLUTION RESPIRATORY (INHALATION) AS NEEDED
Status: DISCONTINUED | OUTPATIENT
Start: 2018-02-27 | End: 2018-02-27 | Stop reason: HOSPADM

## 2018-02-27 RX ORDER — HYDROMORPHONE HYDROCHLORIDE 2 MG/1
1 TABLET ORAL
Status: DISCONTINUED | OUTPATIENT
Start: 2018-02-27 | End: 2018-02-28 | Stop reason: HOSPADM

## 2018-02-27 RX ORDER — HYDROMORPHONE HYDROCHLORIDE 2 MG/ML
0.2 INJECTION, SOLUTION INTRAMUSCULAR; INTRAVENOUS; SUBCUTANEOUS
Status: DISCONTINUED | OUTPATIENT
Start: 2018-02-27 | End: 2018-02-28 | Stop reason: HOSPADM

## 2018-02-27 RX ORDER — ONDANSETRON 2 MG/ML
INJECTION INTRAMUSCULAR; INTRAVENOUS AS NEEDED
Status: DISCONTINUED | OUTPATIENT
Start: 2018-02-27 | End: 2018-02-27 | Stop reason: HOSPADM

## 2018-02-27 RX ORDER — ENOXAPARIN SODIUM 100 MG/ML
40 INJECTION SUBCUTANEOUS EVERY 12 HOURS
Status: DISCONTINUED | OUTPATIENT
Start: 2018-02-28 | End: 2018-02-28 | Stop reason: HOSPADM

## 2018-02-27 RX ORDER — PROCHLORPERAZINE MALEATE 10 MG
5 TABLET ORAL
Status: DISCONTINUED | OUTPATIENT
Start: 2018-02-27 | End: 2018-02-28 | Stop reason: HOSPADM

## 2018-02-27 RX ORDER — AMOXICILLIN 250 MG
1 CAPSULE ORAL DAILY
Qty: 30 TAB | Refills: 0 | Status: SHIPPED | OUTPATIENT
Start: 2018-02-27 | End: 2018-08-09

## 2018-02-27 RX ORDER — METRONIDAZOLE 500 MG/100ML
500 INJECTION, SOLUTION INTRAVENOUS ONCE
Status: COMPLETED | OUTPATIENT
Start: 2018-02-27 | End: 2018-02-27

## 2018-02-27 RX ORDER — PROCHLORPERAZINE EDISYLATE 5 MG/ML
10 INJECTION INTRAMUSCULAR; INTRAVENOUS
Status: DISCONTINUED | OUTPATIENT
Start: 2018-02-27 | End: 2018-02-28 | Stop reason: HOSPADM

## 2018-02-27 RX ORDER — ACETAMINOPHEN 325 MG/1
650 TABLET ORAL
Status: DISCONTINUED | OUTPATIENT
Start: 2018-02-27 | End: 2018-02-28 | Stop reason: HOSPADM

## 2018-02-27 RX ORDER — ALPRAZOLAM 0.5 MG/1
0.25 TABLET ORAL
Status: DISCONTINUED | OUTPATIENT
Start: 2018-02-27 | End: 2018-02-28 | Stop reason: HOSPADM

## 2018-02-27 RX ORDER — SODIUM CHLORIDE 0.9 % (FLUSH) 0.9 %
5-10 SYRINGE (ML) INJECTION EVERY 8 HOURS
Status: DISCONTINUED | OUTPATIENT
Start: 2018-02-27 | End: 2018-02-28 | Stop reason: HOSPADM

## 2018-02-27 RX ORDER — HYDROMORPHONE HYDROCHLORIDE 2 MG/ML
INJECTION, SOLUTION INTRAMUSCULAR; INTRAVENOUS; SUBCUTANEOUS AS NEEDED
Status: DISCONTINUED | OUTPATIENT
Start: 2018-02-27 | End: 2018-02-27 | Stop reason: HOSPADM

## 2018-02-27 RX ORDER — DIPHENHYDRAMINE HYDROCHLORIDE 50 MG/ML
12.5 INJECTION, SOLUTION INTRAMUSCULAR; INTRAVENOUS
Status: DISCONTINUED | OUTPATIENT
Start: 2018-02-27 | End: 2018-02-28 | Stop reason: HOSPADM

## 2018-02-27 RX ORDER — AMOXICILLIN 250 MG
2 CAPSULE ORAL DAILY
Status: DISCONTINUED | OUTPATIENT
Start: 2018-02-28 | End: 2018-02-28 | Stop reason: HOSPADM

## 2018-02-27 RX ORDER — IBUPROFEN 200 MG
400 TABLET ORAL
Status: DISCONTINUED | OUTPATIENT
Start: 2018-02-27 | End: 2018-02-28 | Stop reason: HOSPADM

## 2018-02-27 RX ORDER — DEXTROSE, SODIUM CHLORIDE, AND POTASSIUM CHLORIDE 5; .45; .15 G/100ML; G/100ML; G/100ML
75 INJECTION INTRAVENOUS CONTINUOUS
Status: DISCONTINUED | OUTPATIENT
Start: 2018-02-27 | End: 2018-02-28 | Stop reason: HOSPADM

## 2018-02-27 RX ORDER — EPHEDRINE SULFATE 50 MG/ML
INJECTION, SOLUTION INTRAVENOUS AS NEEDED
Status: DISCONTINUED | OUTPATIENT
Start: 2018-02-27 | End: 2018-02-27 | Stop reason: HOSPADM

## 2018-02-27 RX ORDER — SODIUM CHLORIDE 0.9 % (FLUSH) 0.9 %
5-10 SYRINGE (ML) INJECTION EVERY 8 HOURS
Status: DISCONTINUED | OUTPATIENT
Start: 2018-02-27 | End: 2018-02-27 | Stop reason: HOSPADM

## 2018-02-27 RX ORDER — ONDANSETRON 4 MG/1
4 TABLET, ORALLY DISINTEGRATING ORAL
Qty: 5 TAB | Refills: 2 | Status: SHIPPED | OUTPATIENT
Start: 2018-02-27 | End: 2018-08-23

## 2018-02-27 RX ORDER — DIPHENHYDRAMINE HYDROCHLORIDE 50 MG/ML
12.5 INJECTION, SOLUTION INTRAMUSCULAR; INTRAVENOUS
Status: DISCONTINUED | OUTPATIENT
Start: 2018-02-27 | End: 2018-02-27 | Stop reason: HOSPADM

## 2018-02-27 RX ORDER — MIDAZOLAM HYDROCHLORIDE 1 MG/ML
2 INJECTION, SOLUTION INTRAMUSCULAR; INTRAVENOUS ONCE
Status: COMPLETED | OUTPATIENT
Start: 2018-02-27 | End: 2018-02-27

## 2018-02-27 RX ORDER — GLYCOPYRROLATE 0.2 MG/ML
INJECTION INTRAMUSCULAR; INTRAVENOUS AS NEEDED
Status: DISCONTINUED | OUTPATIENT
Start: 2018-02-27 | End: 2018-02-27 | Stop reason: HOSPADM

## 2018-02-27 RX ORDER — PROPOFOL 10 MG/ML
INJECTION, EMULSION INTRAVENOUS AS NEEDED
Status: DISCONTINUED | OUTPATIENT
Start: 2018-02-27 | End: 2018-02-27 | Stop reason: HOSPADM

## 2018-02-27 RX ORDER — FENTANYL CITRATE 50 UG/ML
100 INJECTION, SOLUTION INTRAMUSCULAR; INTRAVENOUS ONCE
Status: DISCONTINUED | OUTPATIENT
Start: 2018-02-27 | End: 2018-02-27 | Stop reason: HOSPADM

## 2018-02-27 RX ORDER — ONDANSETRON 2 MG/ML
4 INJECTION INTRAMUSCULAR; INTRAVENOUS
Status: DISCONTINUED | OUTPATIENT
Start: 2018-02-27 | End: 2018-02-28 | Stop reason: HOSPADM

## 2018-02-27 RX ORDER — DEXAMETHASONE SODIUM PHOSPHATE 4 MG/ML
INJECTION, SOLUTION INTRA-ARTICULAR; INTRALESIONAL; INTRAMUSCULAR; INTRAVENOUS; SOFT TISSUE AS NEEDED
Status: DISCONTINUED | OUTPATIENT
Start: 2018-02-27 | End: 2018-02-27 | Stop reason: HOSPADM

## 2018-02-27 RX ORDER — BUPIVACAINE HYDROCHLORIDE 5 MG/ML
INJECTION, SOLUTION EPIDURAL; INTRACAUDAL AS NEEDED
Status: DISCONTINUED | OUTPATIENT
Start: 2018-02-27 | End: 2018-02-27 | Stop reason: HOSPADM

## 2018-02-27 RX ORDER — LIDOCAINE HYDROCHLORIDE 10 MG/ML
0.1 INJECTION INFILTRATION; PERINEURAL AS NEEDED
Status: DISCONTINUED | OUTPATIENT
Start: 2018-02-27 | End: 2018-02-27 | Stop reason: HOSPADM

## 2018-02-27 RX ORDER — OXYCODONE HYDROCHLORIDE 5 MG/1
10 TABLET ORAL
Status: DISCONTINUED | OUTPATIENT
Start: 2018-02-27 | End: 2018-02-27 | Stop reason: HOSPADM

## 2018-02-27 RX ORDER — SODIUM CHLORIDE, SODIUM LACTATE, POTASSIUM CHLORIDE, CALCIUM CHLORIDE 600; 310; 30; 20 MG/100ML; MG/100ML; MG/100ML; MG/100ML
100 INJECTION, SOLUTION INTRAVENOUS CONTINUOUS
Status: DISCONTINUED | OUTPATIENT
Start: 2018-02-27 | End: 2018-02-27 | Stop reason: HOSPADM

## 2018-02-27 RX ORDER — NALOXONE HYDROCHLORIDE 0.4 MG/ML
0.1 INJECTION, SOLUTION INTRAMUSCULAR; INTRAVENOUS; SUBCUTANEOUS AS NEEDED
Status: DISCONTINUED | OUTPATIENT
Start: 2018-02-27 | End: 2018-02-27 | Stop reason: HOSPADM

## 2018-02-27 RX ORDER — ONDANSETRON 2 MG/ML
4 INJECTION INTRAMUSCULAR; INTRAVENOUS ONCE
Status: DISCONTINUED | OUTPATIENT
Start: 2018-02-27 | End: 2018-02-27 | Stop reason: HOSPADM

## 2018-02-27 RX ORDER — ACETAMINOPHEN 10 MG/ML
INJECTION, SOLUTION INTRAVENOUS AS NEEDED
Status: DISCONTINUED | OUTPATIENT
Start: 2018-02-27 | End: 2018-02-27 | Stop reason: HOSPADM

## 2018-02-27 RX ORDER — NALOXONE HYDROCHLORIDE 0.4 MG/ML
0.4 INJECTION, SOLUTION INTRAMUSCULAR; INTRAVENOUS; SUBCUTANEOUS AS NEEDED
Status: DISCONTINUED | OUTPATIENT
Start: 2018-02-27 | End: 2018-02-28 | Stop reason: HOSPADM

## 2018-02-27 RX ORDER — HYDROMORPHONE HYDROCHLORIDE 2 MG/1
2 TABLET ORAL
Qty: 20 TAB | Refills: 0 | Status: SHIPPED | OUTPATIENT
Start: 2018-02-27 | End: 2018-08-09

## 2018-02-27 RX ORDER — OXYCODONE HYDROCHLORIDE 5 MG/1
5 TABLET ORAL
Status: DISCONTINUED | OUTPATIENT
Start: 2018-02-27 | End: 2018-02-27 | Stop reason: HOSPADM

## 2018-02-27 RX ORDER — SODIUM CHLORIDE 0.9 % (FLUSH) 0.9 %
5-10 SYRINGE (ML) INJECTION AS NEEDED
Status: DISCONTINUED | OUTPATIENT
Start: 2018-02-27 | End: 2018-02-27 | Stop reason: HOSPADM

## 2018-02-27 RX ORDER — KETAMINE HYDROCHLORIDE 100 MG/ML
INJECTION, SOLUTION INTRAMUSCULAR; INTRAVENOUS AS NEEDED
Status: DISCONTINUED | OUTPATIENT
Start: 2018-02-27 | End: 2018-02-27 | Stop reason: HOSPADM

## 2018-02-27 RX ORDER — HEPARIN SODIUM 5000 [USP'U]/ML
5000 INJECTION, SOLUTION INTRAVENOUS; SUBCUTANEOUS ONCE
Status: COMPLETED | OUTPATIENT
Start: 2018-02-27 | End: 2018-02-27

## 2018-02-27 RX ORDER — LIDOCAINE HYDROCHLORIDE 20 MG/ML
INJECTION, SOLUTION EPIDURAL; INFILTRATION; INTRACAUDAL; PERINEURAL AS NEEDED
Status: DISCONTINUED | OUTPATIENT
Start: 2018-02-27 | End: 2018-02-27 | Stop reason: HOSPADM

## 2018-02-27 RX ORDER — SODIUM CHLORIDE 0.9 % (FLUSH) 0.9 %
5-10 SYRINGE (ML) INJECTION AS NEEDED
Status: DISCONTINUED | OUTPATIENT
Start: 2018-02-27 | End: 2018-02-28 | Stop reason: HOSPADM

## 2018-02-27 RX ORDER — PANTOPRAZOLE SODIUM 40 MG/1
40 TABLET, DELAYED RELEASE ORAL
Status: DISCONTINUED | OUTPATIENT
Start: 2018-02-28 | End: 2018-02-28 | Stop reason: HOSPADM

## 2018-02-27 RX ORDER — GABAPENTIN 300 MG/1
300 CAPSULE ORAL 3 TIMES DAILY
Status: DISCONTINUED | OUTPATIENT
Start: 2018-02-27 | End: 2018-02-28 | Stop reason: HOSPADM

## 2018-02-27 RX ORDER — ALLOPURINOL 300 MG/1
300 TABLET ORAL DAILY
Status: DISCONTINUED | OUTPATIENT
Start: 2018-02-28 | End: 2018-02-28 | Stop reason: HOSPADM

## 2018-02-27 RX ORDER — ROCURONIUM BROMIDE 10 MG/ML
INJECTION, SOLUTION INTRAVENOUS AS NEEDED
Status: DISCONTINUED | OUTPATIENT
Start: 2018-02-27 | End: 2018-02-27 | Stop reason: HOSPADM

## 2018-02-27 RX ORDER — NEOSTIGMINE METHYLSULFATE 1 MG/ML
INJECTION INTRAVENOUS AS NEEDED
Status: DISCONTINUED | OUTPATIENT
Start: 2018-02-27 | End: 2018-02-27 | Stop reason: HOSPADM

## 2018-02-27 RX ORDER — MIDAZOLAM HYDROCHLORIDE 1 MG/ML
2 INJECTION, SOLUTION INTRAMUSCULAR; INTRAVENOUS
Status: DISCONTINUED | OUTPATIENT
Start: 2018-02-27 | End: 2018-02-27 | Stop reason: HOSPADM

## 2018-02-27 RX ORDER — HYDROMORPHONE HYDROCHLORIDE 2 MG/ML
0.5 INJECTION, SOLUTION INTRAMUSCULAR; INTRAVENOUS; SUBCUTANEOUS
Status: DISCONTINUED | OUTPATIENT
Start: 2018-02-27 | End: 2018-02-27 | Stop reason: HOSPADM

## 2018-02-27 RX ADMIN — HYDROMORPHONE HYDROCHLORIDE 1 MG: 2 TABLET ORAL at 17:39

## 2018-02-27 RX ADMIN — GABAPENTIN 300 MG: 300 CAPSULE ORAL at 21:06

## 2018-02-27 RX ADMIN — EPHEDRINE SULFATE 10 MG: 50 INJECTION, SOLUTION INTRAVENOUS at 08:44

## 2018-02-27 RX ADMIN — Medication 5 ML: at 21:00

## 2018-02-27 RX ADMIN — HEPARIN SODIUM 5000 UNITS: 5000 INJECTION, SOLUTION INTRAVENOUS; SUBCUTANEOUS at 07:44

## 2018-02-27 RX ADMIN — METRONIDAZOLE 500 MG: 500 INJECTION, SOLUTION INTRAVENOUS at 07:30

## 2018-02-27 RX ADMIN — GABAPENTIN 300 MG: 300 CAPSULE ORAL at 17:39

## 2018-02-27 RX ADMIN — DEXAMETHASONE SODIUM PHOSPHATE 10 MG: 4 INJECTION, SOLUTION INTRA-ARTICULAR; INTRALESIONAL; INTRAMUSCULAR; INTRAVENOUS; SOFT TISSUE at 08:05

## 2018-02-27 RX ADMIN — HYDROMORPHONE HYDROCHLORIDE 0.2 MG: 2 INJECTION, SOLUTION INTRAMUSCULAR; INTRAVENOUS; SUBCUTANEOUS at 14:38

## 2018-02-27 RX ADMIN — EPHEDRINE SULFATE 5 MG: 50 INJECTION, SOLUTION INTRAVENOUS at 09:04

## 2018-02-27 RX ADMIN — SODIUM CHLORIDE, SODIUM LACTATE, POTASSIUM CHLORIDE, AND CALCIUM CHLORIDE 100 ML/HR: 600; 310; 30; 20 INJECTION, SOLUTION INTRAVENOUS at 07:00

## 2018-02-27 RX ADMIN — OXYCODONE HYDROCHLORIDE 10 MG: 5 TABLET ORAL at 12:06

## 2018-02-27 RX ADMIN — ONDANSETRON 4 MG: 2 INJECTION INTRAMUSCULAR; INTRAVENOUS at 09:27

## 2018-02-27 RX ADMIN — ZOLPIDEM TARTRATE 5 MG: 5 TABLET ORAL at 23:55

## 2018-02-27 RX ADMIN — HYDROMORPHONE HYDROCHLORIDE 0.5 MG: 2 INJECTION, SOLUTION INTRAMUSCULAR; INTRAVENOUS; SUBCUTANEOUS at 10:12

## 2018-02-27 RX ADMIN — DEXTROSE MONOHYDRATE, SODIUM CHLORIDE, AND POTASSIUM CHLORIDE 75 ML/HR: 50; 4.5; 1.49 INJECTION, SOLUTION INTRAVENOUS at 14:40

## 2018-02-27 RX ADMIN — HYDROMORPHONE HYDROCHLORIDE 1 MG: 2 INJECTION, SOLUTION INTRAMUSCULAR; INTRAVENOUS; SUBCUTANEOUS at 07:57

## 2018-02-27 RX ADMIN — METRONIDAZOLE 500 MG: 500 INJECTION, SOLUTION INTRAVENOUS at 07:50

## 2018-02-27 RX ADMIN — LIDOCAINE HYDROCHLORIDE 100 MG: 20 INJECTION, SOLUTION EPIDURAL; INFILTRATION; INTRACAUDAL; PERINEURAL at 07:57

## 2018-02-27 RX ADMIN — HYDROMORPHONE HYDROCHLORIDE 0.2 MG: 2 INJECTION, SOLUTION INTRAMUSCULAR; INTRAVENOUS; SUBCUTANEOUS at 09:36

## 2018-02-27 RX ADMIN — DOXYCYCLINE 100 MG: 100 INJECTION, POWDER, LYOPHILIZED, FOR SOLUTION INTRAVENOUS at 20:59

## 2018-02-27 RX ADMIN — KETAMINE HYDROCHLORIDE 50 MG: 100 INJECTION, SOLUTION INTRAMUSCULAR; INTRAVENOUS at 08:03

## 2018-02-27 RX ADMIN — HYDROMORPHONE HYDROCHLORIDE 0.2 MG: 2 INJECTION, SOLUTION INTRAMUSCULAR; INTRAVENOUS; SUBCUTANEOUS at 18:32

## 2018-02-27 RX ADMIN — PROPOFOL 100 MG: 10 INJECTION, EMULSION INTRAVENOUS at 07:57

## 2018-02-27 RX ADMIN — ACETAMINOPHEN 1000 MG: 10 INJECTION, SOLUTION INTRAVENOUS at 09:27

## 2018-02-27 RX ADMIN — PROCHLORPERAZINE EDISYLATE 10 MG: 5 INJECTION INTRAMUSCULAR; INTRAVENOUS at 18:32

## 2018-02-27 RX ADMIN — DOXYCYCLINE 100 MG: 100 INJECTION, POWDER, LYOPHILIZED, FOR SOLUTION INTRAVENOUS at 08:00

## 2018-02-27 RX ADMIN — ONDANSETRON 4 MG: 2 INJECTION INTRAMUSCULAR; INTRAVENOUS at 14:38

## 2018-02-27 RX ADMIN — NEOSTIGMINE METHYLSULFATE 3 MG: 1 INJECTION INTRAVENOUS at 09:42

## 2018-02-27 RX ADMIN — ROCURONIUM BROMIDE 10 MG: 10 INJECTION, SOLUTION INTRAVENOUS at 08:44

## 2018-02-27 RX ADMIN — HYDROMORPHONE HYDROCHLORIDE 0.2 MG: 2 INJECTION, SOLUTION INTRAMUSCULAR; INTRAVENOUS; SUBCUTANEOUS at 09:38

## 2018-02-27 RX ADMIN — KETAMINE HYDROCHLORIDE 25 MG: 100 INJECTION, SOLUTION INTRAMUSCULAR; INTRAVENOUS at 09:00

## 2018-02-27 RX ADMIN — ROCURONIUM BROMIDE 50 MG: 10 INJECTION, SOLUTION INTRAVENOUS at 07:58

## 2018-02-27 RX ADMIN — GLYCOPYRROLATE 0.4 MG: 0.2 INJECTION INTRAMUSCULAR; INTRAVENOUS at 09:42

## 2018-02-27 RX ADMIN — HYDROMORPHONE HYDROCHLORIDE 0.5 MG: 2 INJECTION, SOLUTION INTRAMUSCULAR; INTRAVENOUS; SUBCUTANEOUS at 10:25

## 2018-02-27 RX ADMIN — HYDROMORPHONE HYDROCHLORIDE 0.5 MG: 2 INJECTION, SOLUTION INTRAMUSCULAR; INTRAVENOUS; SUBCUTANEOUS at 10:17

## 2018-02-27 RX ADMIN — MIDAZOLAM HYDROCHLORIDE 2 MG: 1 INJECTION, SOLUTION INTRAMUSCULAR; INTRAVENOUS at 07:40

## 2018-02-27 RX ADMIN — Medication 10 ML: at 14:41

## 2018-02-27 RX ADMIN — ONDANSETRON 4 MG: 2 INJECTION INTRAMUSCULAR; INTRAVENOUS at 17:39

## 2018-02-27 RX ADMIN — ALPRAZOLAM 0.25 MG: 0.5 TABLET ORAL at 22:00

## 2018-02-27 RX ADMIN — HYDROMORPHONE HYDROCHLORIDE 0.2 MG: 2 INJECTION, SOLUTION INTRAMUSCULAR; INTRAVENOUS; SUBCUTANEOUS at 20:59

## 2018-02-27 NOTE — PROGRESS NOTES
TRANSFER - IN REPORT:    Verbal report received from [facility] on [facility]  being received from PACU for routine post - op      Report consisted of patients Situation, Background, Assessment and   Recommendations(SBAR). Information from the following report(s) SBAR, Kardex, Intake/Output, MAR and Recent Results was reviewed with the receiving nurse. Opportunity for questions and clarification was provided. Assessment completed upon patients arrival to unit and care assumed.

## 2018-02-27 NOTE — PERIOP NOTES
TRANSFER - OUT REPORT:    Verbal report given to [address] RN on [place]  being transferred to  for routine post - op       Report consisted of patients Situation, Background, Assessment and   Recommendations(SBAR). Information from the following report(s) SBAR, OR Summary, Procedure Summary, Intake/Output and MAR was reviewed with the receiving nurse. Lines:   Peripheral IV 02/27/18 Right Hand (Active)   Site Assessment Clean, dry, & intact 2/27/2018  9:59 AM   Phlebitis Assessment 0 2/27/2018  9:59 AM   Infiltration Assessment 0 2/27/2018  9:59 AM   Dressing Status Clean, dry, & intact; Occlusive 2/27/2018  9:59 AM   Dressing Type Transparent;Tape 2/27/2018  9:59 AM   Hub Color/Line Status Pink; Infusing 2/27/2018  9:59 AM   Alcohol Cap Used No 2/27/2018  9:59 AM        Opportunity for questions and clarification was provided. Patient transported with:   O2 @ 3 liters    VTE prophylaxis orders have been written for [place]om[place]. Patient and family given floor number and nurses name. Family updated re: pt status after security code verified.

## 2018-02-27 NOTE — PROGRESS NOTES
Patient with pain to LLQ. Medicated per MAR. Ambulated to bathroom and voided twice since soler removal.  Urine clear/yellow. Ate small amount of regular diet for dinner and felt nauseous. Medicated for nausea. Tolerating liquids well at this time. Hourly rounds performed through shift, pt denies needs at this time. Bed in low position and call light/ personal items within reach. Will continue to monitor and report to night shift nurse.

## 2018-02-27 NOTE — PROGRESS NOTES
Pt admitted to room 616 from PACU.     Oriented to room and call light system, instructed to call with needs, verbalized understanding.     Dual skin assessment with this RN and Anirudh Obrien RN     5 lap sites across mid abdomen. Jose A, c/d/i. Admission assessment completed upon arrival to floor.

## 2018-02-27 NOTE — OP NOTES
[facility] REPORT    Karen Salgado  MR#: [id number]  : 1955  ACCOUNT #: [de-identified]   DATE OF SERVICE: 2018    PREOPERATIVE DIAGNOSIS:  Enlarging uterine mass, leiomyoma. POSTOPERATIVE DIAGNOSIS:  Enlarging uterine mass, leiomyoma. PROCEDURES PERFORMED:  Laparoscopic assisted robotic hysterectomy, bilateral salpingo-oophorectomy. SURGEON:  Mesha Brady MD    ASSISTANT neli hammond    ANESTHESIA:  General.    ESTIMATED BLOOD LOSS:  75 mL. COMPLICATIONS:  None. PACKS:  None. DRAINS:  Straight Castillo catheter. PATHOLOGY:  Above and washings. DISPOSITION:  PACU. INDICATIONS:  This is a patient who was sent and was being monitored for a presumed leiomyoma which increased in size during monitoring. She wished definitive diagnosis and the risks, benefits and alternatives morbidity and mortality preoperatively were reviewed, especially in light of her multiple perioperative issues and allergies along with her prior medical history of thrombosis including, but not limited to, anesthesia, bleeding, infection, transfusion, damage to surrounding organs, including gastrointestinal, genitourinary, nerves, arteries, veins, possible need for further therapy, and small injury and anesthesia issues along with medical morbidities and mortalities and she wished to proceed. The relatively rare nature of uterine malignancies was also reviewed preoperatively. FINDINGS:  Normal upper abdomen, normal bowel, ureters, normal pre and post-procedure by appearance. Adnexa normal.  Uterus was enlarged, but otherwise normal and removed intact. PROCEDURE:  The patient was taken to the operating room and placed under general anesthesia, sterilely prepped and draped. A Castillo catheter placed. Uterine manipulator placed.   Veress needle introduced supraumbilical with low patient pressures, reassuring drop test.  Further trocars, 2 right, 2 left, were placed under direct visualization. Washings taken. Steep Trendelenburg achieved. Findings were as above. The retroperitoneal spaces were opened. The ureters were visually identified and a window made between ureters and IP. The IPs were grasped with bipolar, cauterized, and transected. The anterior cul-de-sac was carefully sharply dissected and bladder flap was created with sharp dissection to the external cervical manipulator. The uterine arteries were skeletonized bilaterally. They were grasped high on the uterus, cauterized with bipolar, and transected. The anterior cul-de-sac was entered. Circumferential incision was made and the specimen was released and removed vaginally intact. The vaginal apex was closed with first a pursestring on the vaginal mucosa. A running locked self-locking suture followed by a running self-locking suture AP. Irrigation performed. Hemostasis noted. Sophie placed over the denuded pelvis. The 10/12 trocar site left lateral was closed with closure device under direct visualization. The remainder of the trocars were removed after insufflation allowed to resolve. Sponge, lap, and needle counts were correct. The patient tolerated the procedure well and was taken to PACU stable.       MD JOSE Spencer / TN  D: 02/27/2018 10:02     T: 02/27/2018 12:48  JOB #: [id number]

## 2018-02-27 NOTE — PROGRESS NOTES
Patient would like to take her home medications. Need to know should her lisinopril 20 mg daily be added. Will take medications to pharmacy to be verified.

## 2018-02-27 NOTE — IP AVS SNAPSHOT
[address] [facility] 
 
 
 [address] [facility] [address] [facility] 
[phone] Patient: Ruthann Cisneros MRN: [id number] IJT:[id number] About your hospitalization You were admitted on:  February 27, 2018 You last received care in the:  [facility] 6 MED SURG You were discharged on:  February 28, 2018 Why you were hospitalized Your primary diagnosis was:  Leiomyoma Of Body Of Uterus Your diagnoses also included:  Current Use Of Long Term Anticoagulation, History Of Cva (Cerebrovascular Accident), History Of Dvt Of Lower Extremity, Htn (Hypertension) Follow-up Information Follow up With Details Comments Contact Info Campos Plaza MD   [address] [facility] [address] [phone] Your Scheduled Appointments Thursday March 08, 2018  8:00 AM EST Follow Up with Toma Cook MD  
[facility] Hematology and Oncology [facility]) C/ Flakito Vasquez [address] [place] [id number]  
[phone] Monday March 26, 2018  9:00 AM EDT PHONE ASSESSMENT with SFE PHONE APPOINTMENT 119 Valentina Tran Pre Assessment ([facility] [address]) 534 [facility]  
[phone] Date/time displayed does not reflect when your phone assessment will be completed. Monday April 09, 2018 TURBINATE REDUCTION, NASAL ENDOSCOPY W/BIOPSY with Marie Kaiser DO  
SFE SURGERY ([facility] [address]) 300 [facility] [address] [facility]  
[phone] Tuesday April 17, 2018  9:15 AM EDT  
POST OP with Marei Kaiser DO  
[facility] 8001 [facility]) [address]  
[phone] Discharge Orders None A check dea indicates which time of day the medication should be taken. My Medications START taking these medications  Instructions Each Dose to Equal  
 Morning Noon Evening Bedtime HYDROmorphone 2 mg tablet Commonly known as:  DILAUDID Your last dose was:  2/27 5:40 pm  
  
Your next dose is: Take on as needed schedule Take 1 Tab by mouth every four (4) hours as needed for Pain. Max Daily Amount: 12 mg.  
 2 mg  
    
   
   
   
  
 ondansetron 4 mg disintegrating tablet Commonly known as:  ZOFRAN ODT Your next dose is: Take on as needed schedule Take 1 Tab by mouth every eight (8) hours as needed for Nausea. 4 mg  
    
   
   
   
  
 senna-docusate 8.6-50 mg per tablet Commonly known as:  SENOKOT-S Your next dose is:  Tomorrow Morning Take 1 Tab by mouth daily. 1 Tab CHANGE how you take these medications Instructions Each Dose to Equal  
 Morning Noon Evening Bedtime  
 allopurinol 300 mg tablet Commonly known as:  Sulma Ramires What changed:  when to take this Your next dose is:  Tomorrow Morning Take 1 Tab by mouth daily. 300 mg  
    
  
   
   
   
  
 ALPRAZolam 0.25 mg tablet Commonly known as:  Joline Mcburney What changed:  when to take this Your last dose was:  2/27 10 pm  
  
Your next dose is: Take on as needed schedule Take 1 Tab by mouth two (2) times daily as needed for Anxiety. Max Daily Amount: 0.5 mg.  
 0.25 mg  
    
   
   
   
  
 colchicine 0.6 mg tablet Commonly known as:  COLCRYS What changed:  additional instructions Acute gout: 1.2 mg (2 tabs) PO at signs of attack then 0.6 mg (1 tab) 1 h later. No more than 3 tablets in the first 24 h. Continue taking 1 tablet daily. lisinopril 20 mg tablet Commonly known as:  Nico Bello What changed:  when to take this Your next dose is:  Tomorrow Morning Take 1 Tab by mouth daily. Indications: hypertension 20 mg CONTINUE taking these medications  Instructions Each Dose to Equal  
 Morning Noon Evening Bedtime ADVIL 200 mg tablet Generic drug:  ibuprofen Your next dose is: Take on as needed schedule Take 200 mg by mouth every six (6) hours as needed. Indications: hold until after surgery 200 mg  
    
   
   
   
  
 cyanocobalamin 1,000 mcg tablet Your next dose is:  Tomorrow Morning Take 1,500 mcg by mouth daily. Indications: PREVENTION OF VITAMIN B12 DEFICIENCY, hold until after surgery 1500 mcg  
    
  
   
   
   
  
 gabapentin 300 mg capsule Commonly known as:  NEURONTIN Your next dose is: This afternoon Take 1 Cap by mouth three (3) times daily. Indications: NEUROPATHIC PAIN  
 300 mg  
    
  
   
  
   
  
   
  
 OMEGA 3 FISH OIL PO Your next dose is:  Tomorrow Morning Take 2,100 mg by mouth daily. 2100 mg cap--1 cap po qd   Indications: am-hold until after surgery 2100 mg  
    
  
   
   
   
  
 omeprazole 40 mg capsule Commonly known as:  PRILOSEC Your next dose is: This evening Take 1 Cap by mouth two (2) times a day. Indications: gastroesophageal reflux disease 40 mg  
    
  
   
   
  
   
  
 VITAMIN D3 PO Your next dose is:  Tomorrow Morning Take 10,000 Units by mouth daily. Indications: am   hold until after surgery [address] Units  
    
  
   
   
   
  
 zolpidem 10 mg tablet Commonly known as:  AMBIEN Your last dose was:  11:55 pm 2/27 Your next dose is: Take on as needed schedule Take 1 Tab by mouth nightly as needed for Sleep. Max Daily Amount: 10 mg.  
 10 mg  
    
   
   
   
  
  
STOP taking these medications   
 warfarin 6 mg tablet Commonly known as:  COUMADIN Where to Get Your Medications Information on where to get these meds will be given to you by the nurse or doctor. ! Ask your nurse or doctor about these medications HYDROmorphone 2 mg tablet  
 ondansetron 4 mg disintegrating tablet senna-docusate 8.6-50 mg per tablet Discharge Instructions DISCHARGE SUMMARY from Nurse PATIENT INSTRUCTIONS: 
 
 
F-face looks uneven A-arms unable to move or move unevenly S-speech slurred or non-existent T-time-call 911 as soon as signs and symptoms begin-DO NOT go Back to bed or wait to see if you get better-TIME IS BRAIN. Warning Signs of HEART ATTACK Call 911 if you have these symptoms: 
? Chest discomfort. Most heart attacks involve discomfort in the center of the chest that lasts more than a few minutes, or that goes away and comes back. It can feel like uncomfortable pressure, squeezing, fullness, or pain. ? Discomfort in other areas of the upper body. Symptoms can include pain or discomfort in one or both arms, the back, neck, jaw, or stomach. ? Shortness of breath with or without chest discomfort. ? Other signs may include breaking out in a cold sweat, nausea, or lightheadedness. Don't wait more than five minutes to call [address]! Fast action can save your life. Calling 911 is almost always the fastest way to get lifesaving treatment. Emergency Medical Services staff can begin treatment when they arrive  up to an hour sooner than if someone gets to the hospital by car. The discharge information has been reviewed with the patient. The patient verbalized understanding. Discharge medications reviewed with the patient and appropriate educational materials and side effects teaching were provided.  
___________________________________________________________________________ ________________________________________________________ Introducing [facility]! Dear Dino DUFF: 
Thank you for requesting a Pint Please account. Our records indicate that you already have an active Pint Please account. You can access your account anytime at [email or web address] Did you know that you can access your hospital and ER discharge instructions at any time in Pint Please? You can also review all of your test results from your hospital stay or ER visit. Additional Information If you have questions, please visit the Frequently Asked Questions section of the Pint Please website at [email or web address] Remember, Pint Please is NOT to be used for urgent needs. For medical emergencies, dial 911. Now available from your iPhone and Android! Unresulted Labs-Please follow up with your PCP about these lab tests Order Current Status PROTHROMBIN TIME + INR In process Providers Seen During Your Hospitalization Provider Specialty Primary office phone Massimo Lawrence MD Gynecologic Oncology [phone] Your Primary Care Physician (PCP) Primary Care Physician Office Phone Office Fax [facility], [address] [place] [address] Rd [facility] [phone] You are allergic to the following Allergen Reactions Clindamycin Anaphylaxis Iodinated Contrast- Oral And Iv Dye Anaphylaxis Ultram (Tramadol) Anaphylaxis Aloe Vera Hives Codeine Nausea and Vomiting Erythromycin Other (comments) Herpes infection; pt states from tip of tongue down to digestive system. Heparin Analogues Hives Pt states ok to take warfarin. Lanolin Hives Other Medication Hives Allergic to most antibiotics Vigamox (Moxifloxacin) Itching Aspartame Nausea and Vomiting Augmentin (Amoxicillin-Pot Clavulanate) Itching Cefzil (Cefprozil) Rash Fentanyl Nausea and Vomiting Morphine Nausea and Vomiting  
    
 Sulfa (Sulfonamide Antibiotics) Itching Varicella Virus Vacc Live (Pf) Rash Shingle vaccine Recent Documentation Weight BMI OB Status Smoking Status 108.4 kg 45.16 kg/m2 Postmenopausal Never Smoker Emergency Contacts Name Discharge Info Relation Home Work Mobile Bigg Amaral DISCHARGE CAREGIVER [3] Son [22] [phone] Sander Reinoso DISCHARGE CAREGIVER [3] Son [22] [phone] Patient Belongings The following personal items are in your possession at time of discharge: 
  Dental Appliances: At home  Visual Aid: Glasses Discharge Instructions Attachments/References HYSTERECTOMY: VAGINAL LAPAROSCOPIC-ASSISTED: POST-OP (ENGLISH) Patient Handouts Laparoscopically Assisted Vaginal Hysterectomy: What to Expect at [facility] Your Recovery You can expect to feel better and stronger each day, although you may need pain medicine for a week or two. You may get tired easily or have less energy than usual. This may last for several weeks after surgery. You will probably notice that your belly is swollen and puffy. This is common. The swelling will take several weeks to go down. It may take about 4 to 6 weeks to fully recover. The recovery time may be less for some patients. You may have some light vaginal bleeding. Don't have sex until the doctor says it is okay. Don't douche or put anything into your vagina, such as a tampon, until your doctor says it is okay. It is important to avoid lifting while you are recovering so that you can heal. 
This care sheet gives you a general idea about how long it will take for you to recover. But each person recovers at a different pace. Follow the steps below to get better as quickly as possible. How can you care for yourself at home? Activity ? · Rest when you feel tired. Getting enough sleep will help you recover. ? · Try to walk each day. Start by walking a little more than you did the day before. Bit by bit, increase the amount you walk. Walking boosts blood flow and helps prevent pneumonia and constipation. ? · Avoid lifting anything that would make you strain. This may include heavy grocery bags and milk containers, a heavy briefcase or backpack, cat litter or dog food bags, a vacuum , or a child. ? · Avoid strenuous activities, such as biking, jogging, weight lifting, or aerobic exercise, until your doctor says it is okay. ? · You may shower. Pat the cut (incision) dry. Do not take a bath for the first 2 weeks, or until your doctor tells you it is okay. ? · Ask your doctor when you can drive again. ? · You will probably need to take about 2 weeks off from work. It depends on the type of work you do and how you feel. ? · Your doctor will tell you when you can have sex again. Diet ? · You can eat your normal diet. If your stomach is upset, try bland, low-fat foods like plain rice, broiled chicken, toast, and yogurt. ? · Drink plenty of fluids (unless your doctor tells you not to). ? · You may notice that your bowel movements are not regular right after your surgery. This is common. Try to avoid constipation and straining with bowel movements. You may want to take a fiber supplement every day. If you have not had a bowel movement after a couple of days, ask your doctor about taking a mild laxative. Medicines ? · Your doctor will tell you if and when you can restart your medicines. He or she will also give you instructions about taking any new medicines. ? · If you take blood thinners, such as warfarin (Coumadin), clopidogrel (Plavix), or aspirin, be sure to talk to your doctor. He or she will tell you if and when to start taking those medicines again.  Make sure that you understand exactly what your doctor wants you to do. ? · Be safe with medicines. Take pain medicines exactly as directed. ¨ If the doctor gave you a prescription medicine for pain, take it as prescribed. ¨ If you are not taking a prescription pain medicine, ask your doctor if you can take an over-the-counter medicine. ? · If you think your pain medicine is making you sick to your stomach: 
¨ Take your medicine after meals (unless your doctor has told you not to). ¨ Ask your doctor for a different pain medicine. ? · If your doctor prescribed antibiotics, take them as directed. Do not stop taking them just because you feel better. You need to take the full course of antibiotics. Incision care ? · You may have stitches over the cuts (incisions) the doctor made in your belly. If you have strips of tape on the incisions the doctor made, leave the tape on for a week or until it falls off. Or follow your doctor's instructions for removing the tape. ? · Wash the area daily with warm, soapy water, and pat it dry. Don't use hydrogen peroxide or alcohol, which can slow healing. You may cover the area with a gauze bandage if it weeps or rubs against clothing. Change the bandage every day. ? · Keep the area clean and dry. Other instructions ? · You may have some light vaginal bleeding. Wear sanitary pads if needed. Do not douche or use tampons. Follow-up care is a key part of your treatment and safety. Be sure to make and go to all appointments, and call your doctor if you are having problems. It's also a good idea to know your test results and keep a list of the medicines you take. When should you call for help? Call 911 anytime you think you may need emergency care. For example, call if: 
? · You passed out (lost consciousness). ? · You have chest pain, are short of breath, or cough up blood. ?Call your doctor now or seek immediate medical care if: ? · You have pain that does not get better after you take pain medicine. ? · You cannot pass stools or gas. ? · You have vaginal discharge that has increased in amount or smells bad.  
? · You are sick to your stomach or cannot drink fluids. ? · You have loose stitches, or your incision comes open. ? · Bright red blood has soaked through the bandage over your incision. ? · You have signs of infection, such as: 
¨ Increased pain, swelling, warmth, or redness. ¨ Red streaks leading from the incision. ¨ Pus draining from the incision. ¨ A fever. ? · You have bright red vaginal bleeding that soaks one or more pads in an hour, or you have large clots. ? · You have signs of a blood clot in your leg (called a deep vein thrombosis), such as: 
¨ Pain in your calf, back of the knee, thigh, or groin. ¨ Redness and swelling in your leg. ? Watch closely for changes in your health, and be sure to contact your doctor if you have any problems. Where can you learn more? Go to http://ward-jez.info/. Enter S123 in the search box to learn more about \"Laparoscopically Assisted Vaginal Hysterectomy: What to Expect at Home. \" Current as of: October 13, 2016 Content Version: 11.4 © 2818-1429 Healthwise, [employer]. Care instructions adapted under license by [employer] (which disclaims liability or warranty for this information). If you have questions about a medical condition or this instruction, always ask your healthcare professional. [place] [address] any warranty or liability for your use of this information. Please provide this summary of care documentation to your next provider. Signatures-by signing, you are acknowledging that this After Visit Summary has been reviewed with you and you have received a copy. Patient Signature:  ____________________________________________________________ Date:  ____________________________________________________________  
  
[place] Current Provider Signature:  ____________________________________________________________ Date:  ____________________________________________________________

## 2018-02-27 NOTE — ANESTHESIA POSTPROCEDURE EVALUATION
Post-Anesthesia Evaluation and Assessment    Patient: Elly Mccoy MRN: [id number]  SSN: [id number]    YOB: 1955  Age: 60 y.o. Sex: female       Cardiovascular Function/Vital Signs  Visit Vitals    /75    Pulse 87    Temp 36.4 °C (97.6 °F)    Resp 18    SpO2 100%       Patient is status post general anesthesia for Procedure(s): HYSTERECTOMY ROBOTIC ASSISTED BILATERAL SALPINGO OOPHORECTOMY. Nausea/Vomiting: None    Postoperative hydration reviewed and adequate. Pain:  Pain Scale 1: Numeric (0 - 10) (02/27/18 1017)  Pain Intensity 1: 8 (02/27/18 1017)   Managed    Neurological Status:   Neuro (WDL): Within Defined Limits (02/27/18 0959)   At baseline    Mental Status and Level of Consciousness: Arousable    Pulmonary Status:   O2 Device: Oxygen mask (02/27/18 0959)   Adequate oxygenation and airway patent    Complications related to anesthesia: None    Post-anesthesia assessment completed.  No concerns    Signed By: Andrey Garnica MD     February 27, 2018

## 2018-02-27 NOTE — PROGRESS NOTES
Castillo removed. Patient tolerated well. Patient ambulated to bathroom. Pt is resting comfortably. Respirations are even and unlabored. No signs or symptoms of distress are noted. No SOB or pain is reported at this time. Call light is within reach. Will continue to monitor.

## 2018-02-27 NOTE — BRIEF OP NOTE
BRIEF OPERATIVE NOTE    Date of Procedure: 2/27/2018   Preoperative Diagnosis: Uterine mass [N85.9]  Endometrial polyp [N84.0]  Pelvic pain [R10.2]  Postoperative Diagnosis: Uterine mass [N85.9]  Endometrial polyp [N84.0]  Pelvic pain [R10.2]    Procedure(s):   HYSTERECTOMY ROBOTIC ASSISTED BILATERAL SALPINGO OOPHORECTOMY  Surgeon(s) and Role:     * Lanre Schwartz MD - Primary         Assistant Staff: neli hammond np      Surgical Staff:  Circ-1: Raina Stern  Circ-2: Noah Rojas RN  Circ-Relief: Joselin Stanton RN  Scrub Tech-1: Danae Antoine  Scrub Tech-2: Leslye Muñoz  Event Time In   Incision Start 3854   Incision Close 0940     Anesthesia: General   Estimated Blood Loss: 75 ml  Specimens:   ID Type Source Tests Collected by Time Destination   1 : Uterus with attached cervix Bilateral Fallopian tubes and ovaries Fresh Uterus with Bilateral Fallopian tubes and Awandcolton Bardales MD 2/27/2018 6750 Pathology   1 : Pelvic Washings Fresh Washed Cells  Lanre Schwartz MD 2/27/2018 0840 Cytology      Findings: leiomyoma   Complications: none  Implants: * No implants in log *

## 2018-02-28 VITALS
SYSTOLIC BLOOD PRESSURE: 136 MMHG | OXYGEN SATURATION: 95 % | RESPIRATION RATE: 20 BRPM | HEART RATE: 83 BPM | DIASTOLIC BLOOD PRESSURE: 80 MMHG | WEIGHT: 239 LBS | TEMPERATURE: 98.2 F | BODY MASS INDEX: 45.16 KG/M2

## 2018-02-28 LAB
ANION GAP SERPL CALC-SCNC: 8 MMOL/L (ref 7–16)
BASOPHILS # BLD: 0 K/UL (ref 0–0.2)
BASOPHILS NFR BLD: 0 % (ref 0–2)
BUN SERPL-MCNC: 13 MG/DL (ref 8–23)
CALCIUM SERPL-MCNC: 8.7 MG/DL (ref 8.3–10.4)
CHLORIDE SERPL-SCNC: 110 MMOL/L (ref 98–107)
CO2 SERPL-SCNC: 25 MMOL/L (ref 21–32)
CREAT SERPL-MCNC: 0.99 MG/DL (ref 0.6–1)
DIFFERENTIAL METHOD BLD: ABNORMAL
EOSINOPHIL # BLD: 0 K/UL (ref 0–0.8)
EOSINOPHIL NFR BLD: 0 % (ref 0.5–7.8)
ERYTHROCYTE [DISTWIDTH] IN BLOOD BY AUTOMATED COUNT: 13.4 % (ref 11.9–14.6)
GLUCOSE SERPL-MCNC: 112 MG/DL (ref 65–100)
HCT VFR BLD AUTO: 40.9 % (ref 35.8–46.3)
HGB BLD-MCNC: 14.2 G/DL (ref 11.7–15.4)
IMM GRANULOCYTES # BLD: 0 K/UL (ref 0–0.5)
IMM GRANULOCYTES NFR BLD AUTO: 0 % (ref 0–5)
INR PPP: 1.1
LYMPHOCYTES # BLD: 3 K/UL (ref 0.5–4.6)
LYMPHOCYTES NFR BLD: 20 % (ref 13–44)
MCH RBC QN AUTO: 31 PG (ref 26.1–32.9)
MCHC RBC AUTO-ENTMCNC: 34.7 G/DL (ref 31.4–35)
MCV RBC AUTO: 89.3 FL (ref 79.6–97.8)
MONOCYTES # BLD: 0.8 K/UL (ref 0.1–1.3)
MONOCYTES NFR BLD: 5 % (ref 4–12)
NEUTS SEG # BLD: 11.1 K/UL (ref 1.7–8.2)
NEUTS SEG NFR BLD: 75 % (ref 43–78)
PLATELET # BLD AUTO: 250 K/UL (ref 150–450)
PMV BLD AUTO: 10 FL (ref 10.8–14.1)
POTASSIUM SERPL-SCNC: 3.7 MMOL/L (ref 3.5–5.1)
PROTHROMBIN TIME: 13.3 SEC (ref 11.5–14.5)
RBC # BLD AUTO: 4.58 M/UL (ref 4.05–5.25)
SODIUM SERPL-SCNC: 143 MMOL/L (ref 136–145)
WBC # BLD AUTO: 15 K/UL (ref 4.3–11.1)

## 2018-02-28 PROCEDURE — 36415 COLL VENOUS BLD VENIPUNCTURE: CPT | Performed by: ANESTHESIOLOGY

## 2018-02-28 PROCEDURE — 80048 BASIC METABOLIC PNL TOTAL CA: CPT | Performed by: OBSTETRICS & GYNECOLOGY

## 2018-02-28 PROCEDURE — 74011250636 HC RX REV CODE- 250/636: Performed by: OBSTETRICS & GYNECOLOGY

## 2018-02-28 PROCEDURE — 99218 HC RM OBSERVATION: CPT

## 2018-02-28 PROCEDURE — 85610 PROTHROMBIN TIME: CPT | Performed by: ANESTHESIOLOGY

## 2018-02-28 PROCEDURE — 85025 COMPLETE CBC W/AUTO DIFF WBC: CPT | Performed by: OBSTETRICS & GYNECOLOGY

## 2018-02-28 PROCEDURE — 74011250637 HC RX REV CODE- 250/637: Performed by: OBSTETRICS & GYNECOLOGY

## 2018-02-28 RX ORDER — DOXYCYCLINE 100 MG/1
100 CAPSULE ORAL EVERY 12 HOURS
Status: DISCONTINUED | OUTPATIENT
Start: 2018-02-28 | End: 2018-02-28 | Stop reason: HOSPADM

## 2018-02-28 RX ADMIN — Medication 10 ML: at 06:00

## 2018-02-28 RX ADMIN — HYDROMORPHONE HYDROCHLORIDE 0.2 MG: 2 INJECTION, SOLUTION INTRAMUSCULAR; INTRAVENOUS; SUBCUTANEOUS at 05:52

## 2018-02-28 RX ADMIN — PANTOPRAZOLE SODIUM 40 MG: 40 TABLET, DELAYED RELEASE ORAL at 05:53

## 2018-02-28 RX ADMIN — STANDARDIZED SENNA CONCENTRATE AND DOCUSATE SODIUM 2 TABLET: 8.6; 5 TABLET, FILM COATED ORAL at 08:36

## 2018-02-28 RX ADMIN — HYDROMORPHONE HYDROCHLORIDE 1 MG: 2 TABLET ORAL at 10:08

## 2018-02-28 RX ADMIN — PROCHLORPERAZINE MALEATE 5 MG: 10 TABLET, FILM COATED ORAL at 10:21

## 2018-02-28 RX ADMIN — IBUPROFEN 400 MG: 200 TABLET, FILM COATED ORAL at 08:36

## 2018-02-28 RX ADMIN — DOXYCYCLINE HYCLATE 100 MG: 100 CAPSULE ORAL at 08:36

## 2018-02-28 RX ADMIN — ENOXAPARIN SODIUM 40 MG: 40 INJECTION SUBCUTANEOUS at 10:11

## 2018-02-28 NOTE — PROGRESS NOTES
Discharge instructions and prescriptions provided and explained to patient, patient voiced understanding. Medication side effect sheet reviewed with pt. No home meds or valuables to return. Opportunity for questions provided. pt's son will be here to pick pt up at 10 am.  Instructed to call once ready to leave the floor.

## 2018-02-28 NOTE — DISCHARGE INSTRUCTIONS
DISCHARGE SUMMARY from Nurse    PATIENT INSTRUCTIONS:    After general anesthesia or intravenous sedation, for 24 hours or while taking prescription Narcotics:  · Limit your activities  · Do not drive and operate hazardous machinery  · Do not make important personal or business decisions  · Do  not drink alcoholic beverages  · If you have not urinated within 8 hours after discharge, please contact your surgeon on call. Report the following to your surgeon:  · Excessive pain, swelling, redness or odor of or around the surgical area  · Temperature over 100.5  · Nausea and vomiting lasting longer than 4 hours or if unable to take medications  · Any signs of decreased circulation or nerve impairment to extremity: change in color, persistent  numbness, tingling, coldness or increase pain  · Any questions    What to do at Home:  Recommended activity: Activity as tolerated, no driving while taking pain medication    If you experience any of the following symptoms fever, chills, new or unrelieved pain, persistent nausea or vomiting, bleeding heavier than a period or any other worrisome symptoms, please follow up with Dr. Heike Sanchez. *  Please give a list of your current medications to your Primary Care Provider. *  Please update this list whenever your medications are discontinued, doses are      changed, or new medications (including over-the-counter products) are added. *  Please carry medication information at all times in case of emergency situations. These are general instructions for a healthy lifestyle:    No smoking/ No tobacco products/ Avoid exposure to second hand smoke  Surgeon General's Warning:  Quitting smoking now greatly reduces serious risk to your health.     Obesity, smoking, and sedentary lifestyle greatly increases your risk for illness    A healthy diet, regular physical exercise & weight monitoring are important for maintaining a healthy lifestyle    You may be retaining fluid if you have a history of heart failure or if you experience any of the following symptoms:  Weight gain of 3 pounds or more overnight or 5 pounds in a week, increased swelling in our hands or feet or shortness of breath while lying flat in bed. Please call your doctor as soon as you notice any of these symptoms; do not wait until your next office visit. Recognize signs and symptoms of STROKE:    F-face looks uneven    A-arms unable to move or move unevenly    S-speech slurred or non-existent    T-time-call 911 as soon as signs and symptoms begin-DO NOT go       Back to bed or wait to see if you get better-TIME IS BRAIN. Warning Signs of HEART ATTACK     Call 911 if you have these symptoms:   Chest discomfort. Most heart attacks involve discomfort in the center of the chest that lasts more than a few minutes, or that goes away and comes back. It can feel like uncomfortable pressure, squeezing, fullness, or pain.  Discomfort in other areas of the upper body. Symptoms can include pain or discomfort in one or both arms, the back, neck, jaw, or stomach.  Shortness of breath with or without chest discomfort.  Other signs may include breaking out in a cold sweat, nausea, or lightheadedness. Don't wait more than five minutes to call 911 - MINUTES MATTER! Fast action can save your life. Calling 911 is almost always the fastest way to get lifesaving treatment. Emergency Medical Services staff can begin treatment when they arrive -- up to an hour sooner than if someone gets to the hospital by car. The discharge information has been reviewed with the patient. The patient verbalized understanding. Discharge medications reviewed with the patient and appropriate educational materials and side effects teaching were provided.   ___________________________________________________________________________________________________________________________________

## 2018-02-28 NOTE — PROGRESS NOTES
CM attempted to provide León Letter to patient but patient has received her discharge papers prior to 24hrs.

## 2018-02-28 NOTE — DISCHARGE SUMMARY
[facility] 17 SUMMARY    Deya Johnston  MR#: [id number]  : 1955  ACCOUNT #: [de-identified]   ADMIT DATE: 2018  DISCHARGE DATE: 2018    DISCHARGE DIAGNOSES:    1. Leiomyoma. 2.  Personal history of thrombosis. HOSPITAL COURSE:  Admitted for same day surgery, underwent laparoscopic assisted robotic hysterectomy, bilateral salpingo-oophorectomy without apparent complications. Did well postoperatively, was discharged in good condition after instructions, precautions and intraoperative findings reviewed. DISPOSITION:  Home. MEDICATIONS:  EMR script checked, Lovenox prophylactic to be extended beyond discharge. FOLLOWUP:  1 week.       MD JOSE Clayton/  D: 2018 07:27     T: 2018 09:49  JOB #: [id number]

## 2018-02-28 NOTE — PROGRESS NOTES
Kasandra Cruz  Admission Date: 2/27/2018               Daily Progress Note: 2/28/2018    LAB  Recent Labs      02/28/18   0526  02/27/18   1035  02/27/18   0734   WBC  15.0*   --    --    HGB  14.2  14.9   --    HCT  40.9  42.5   --    PLT  250   --    --    NA  143   --    --    K  3.7   --    --    CL  110*   --    --    CO2  25   --    --    BUN  13   --    --    CREA  0.99   --    --    GLU  112*   --    --    INR   --    --   1.1         Visit Vitals    /79 (BP 1 Location: Right arm, BP Patient Position: At rest)    Pulse 89    Temp 98.6 °F (37 °C)    Resp 20    Wt 239 lb (108.4 kg)    LMP  (LMP Unknown)    SpO2 94%    BMI 45.16 kg/m2       Current Facility-Administered Medications   Medication Dose Route Frequency Provider Last Rate Last Dose    allopurinol (ZYLOPRIM) tablet 300 mg (Patient Supplied)  300 mg Oral DAILY Alec Fuentes MD        ALPRAZolam Elise Iba) tablet 0.25 mg  0.25 mg Oral QHS Alec Fuentes MD   0.25 mg at 02/27/18 2200    gabapentin (NEURONTIN) capsule 300 mg (Patient Supplied)  300 mg Oral TID Alec Fuentes MD   300 mg at 02/27/18 2106    ibuprofen (MOTRIN) tablet 400 mg  400 mg Oral Q6H PRN Alec Fuentes MD        pantoprazole (PROTONIX) tablet 40 mg  40 mg Oral ACB Alec Fuentes MD   40 mg at 02/28/18 0553    zolpidem (AMBIEN) tablet 5 mg  5 mg Oral QHS PRN Alec Fuentes MD   5 mg at 02/27/18 5605    sodium chloride (NS) flush 5-10 mL  5-10 mL IntraVENous Q8H Alec Fuentes MD   5 mL at 02/27/18 2100    sodium chloride (NS) flush 5-10 mL  5-10 mL IntraVENous PRN Alec Fuentes MD        dextrose 5% - 0.45% NaCl with KCl 20 mEq/L infusion  75 mL/hr IntraVENous CONTINUOUS Alec Fuentes MD 75 mL/hr at 02/27/18 1440 75 mL/hr at 02/27/18 1440    acetaminophen (TYLENOL) tablet 650 mg  650 mg Oral Q4H PRN Alec Fuentes MD        HYDROmorphone (DILAUDID) tablet 1 mg  1 mg Oral Q4H PRN Alec Fuentes MD   1 mg at 02/27/18 1739    HYDROmorphone (PF) (DILAUDID) injection 0.2 mg  0.2 mg IntraVENous Q4H PRN Anabel Arguelles MD   0.2 mg at 02/28/18 8045    naloxone [facility]) injection 0.4 mg  0.4 mg IntraVENous PRN Anabel Arguelles MD        ondansetron [facility]) injection 4 mg  4 mg IntraVENous Q4H PRN Anabel Arguelles MD   4 mg at 02/27/18 1739    diphenhydrAMINE (BENADRYL) injection 12.5 mg  12.5 mg IntraVENous Q4H PRN Anabel Arguelles MD        senna-docusate (PERICOLACE) 8.6-50 mg per tablet 2 Tab  2 Tab Oral DAILY Anabel Arguelles MD        enoxaparin (LOVENOX) injection 40 mg  40 mg SubCUTAneous Q12H Anabel Arguelles MD        prochlorperazine (COMPAZINE) tablet 5 mg  5 mg Oral Q6H PRN Anabel Arguelles MD        prochlorperazine (COMPAZINE) injection 10 mg  10 mg IntraVENous Q6H PRN Anabel Arguelles MD   10 mg at 02/27/18 1832    doxycycline (VIBRAMYCIN) 100 mg in 0.9% sodium chloride (MBP/ADV) 100 mL  100 mg IntraVENous Q12H Anabel Arguelles  mL/hr at 02/27/18 2059 100 mg at 02/27/18 2059       Allergies   Allergen Reactions    Clindamycin Anaphylaxis    Iodinated Contrast- Oral And Iv Dye Anaphylaxis    Ultram [Tramadol] Anaphylaxis    Aloe Vera Hives    Codeine Nausea and Vomiting    Erythromycin Other (comments)     Herpes infection; pt states from tip of tongue down to digestive system.  Heparin Analogues Hives     Pt states ok to take warfarin.     Lanolin Hives    Other Medication Hives     Allergic to most antibiotics    Vigamox [Moxifloxacin] Itching    Aspartame Nausea and Vomiting    Augmentin [Amoxicillin-Pot Clavulanate] Itching    Cefzil [Cefprozil] Rash    Fentanyl Nausea and Vomiting    Morphine Nausea and Vomiting    Sulfa (Sulfonamide Antibiotics) Itching    Varicella Virus Vacc Live (Pf) Rash     Shingle vaccine       NOTE  No problems  aox3 nad  rrrr  crtab  abd soft nt nd  incs cdi  dc    Anabel Arguelles MD  2/28/2018

## 2018-02-28 NOTE — PROGRESS NOTES
Hourly rounding completed. Pain managed with medication. 5 lap sites CDI. SCDs in place. Spoke with MD Yeung Search about capping fluids. Will continue to monitor.

## 2018-03-07 ENCOUNTER — TELEPHONE (OUTPATIENT)
Dept: ONCOLOGY | Age: 63
End: 2018-03-07

## 2018-03-07 NOTE — TELEPHONE ENCOUNTER
KURT scheduled pt transportation  for 7:15 am for 8:00 am appointment on 3/8 with MD. Alison Mahoney called pt and let her know the  time. KURT updated pt's RN Elbridge Burkitt. No other needs. KURT intends to follow up PRN.

## 2018-03-26 ENCOUNTER — HOSPITAL ENCOUNTER (OUTPATIENT)
Dept: LAB | Age: 63
Discharge: HOME | End: 2018-03-26
Payer: MEDICARE

## 2018-03-26 DIAGNOSIS — D25.0 INTRAMURAL AND SUBMUCOUS LEIOMYOMA OF UTERUS: ICD-10-CM

## 2018-03-26 DIAGNOSIS — D25.1 INTRAMURAL AND SUBMUCOUS LEIOMYOMA OF UTERUS: ICD-10-CM

## 2018-03-26 LAB
BASOPHILS # BLD: 0.1 K/UL (ref 0–0.2)
BASOPHILS NFR BLD: 1 % (ref 0–2)
DIFFERENTIAL METHOD BLD: ABNORMAL
EOSINOPHIL # BLD: 0.3 K/UL (ref 0–0.8)
EOSINOPHIL NFR BLD: 3 % (ref 0.5–7.8)
ERYTHROCYTE [DISTWIDTH] IN BLOOD BY AUTOMATED COUNT: 13 % (ref 11.9–14.6)
HCT VFR BLD AUTO: 45.1 % (ref 35.8–46.3)
HGB BLD-MCNC: 15.8 G/DL (ref 11.7–15.4)
LYMPHOCYTES # BLD: 3.7 K/UL (ref 0.5–4.6)
LYMPHOCYTES NFR BLD: 37 % (ref 13–44)
MCH RBC QN AUTO: 31 PG (ref 26.1–32.9)
MCHC RBC AUTO-ENTMCNC: 35 G/DL (ref 31.4–35)
MCV RBC AUTO: 88.6 FL (ref 79.6–97.8)
MONOCYTES # BLD: 0.7 K/UL (ref 0.1–1.3)
MONOCYTES NFR BLD: 8 % (ref 4–12)
NEUTS SEG # BLD: 5.1 K/UL (ref 1.7–8.2)
NEUTS SEG NFR BLD: 52 % (ref 43–78)
NRBC # BLD: 0 K/UL (ref 0–0.2)
PLATELET # BLD AUTO: 263 K/UL (ref 150–450)
PMV BLD AUTO: 10 FL (ref 10.8–14.1)
RBC # BLD AUTO: 5.09 M/UL (ref 4.05–5.25)
WBC # BLD AUTO: 9.8 K/UL (ref 4.3–11.1)

## 2018-03-26 PROCEDURE — 36415 COLL VENOUS BLD VENIPUNCTURE: CPT | Performed by: NURSE PRACTITIONER

## 2018-03-26 PROCEDURE — 85025 COMPLETE CBC W/AUTO DIFF WBC: CPT | Performed by: NURSE PRACTITIONER

## 2018-05-25 VITALS — HEIGHT: 61 IN | BODY MASS INDEX: 44.56 KG/M2 | WEIGHT: 236 LBS

## 2018-05-25 NOTE — PERIOP NOTES
Patient verified name, , and surgery as listed in [facility]. Type 1b surgery, Phone assessment complete. Extensive hx entered by multiple providers. Orders were received. Labs per surgeon: none  Labs per anesthesia protocol: none. PT/INR upon arrival DOS (on coumadin). Most recent PCP and cardiology notes in EMR if needed DOS. Pt states she has already arranged/discussed Coumadin hold and Lovenox bridge instructions with cardiology and PCP. States she is doing what was done prior to hysterectomy 2018 which worked well. Has not discussed plan with  yet. Self left message for Agata Joaquin @ 's office to call pt and discuss coumadin/lovenox bridge plan. Patient answered medical/surgical history questions at their best of ability. All prior to admission medications documented in [facility]. Patient instructed to take the following medications the day of surgery according to anesthesia guidelines with a small sip of water: Xanax PRN, Neurontin, Omeprazole . Hold all vitamins 7 days prior to surgery and NSAIDS 5 days prior to surgery. Medications to be held - vitamins, ibuprofen    Patient instructed on the following:  Arrive at [address], time of arrival to be called the day before by 1700  NPO after midnight including gum, mints, and ice chips  Responsible adult must drive patient to the hospital, stay during surgery, and patient will  need supervision 24 hours after anesthesia  Use antibacterial soap in shower the night before surgery and on the morning of surgery  Leave all valuables (money and jewelry) at home but bring insurance card and ID on       DOS  Do not wear make-up, nail polish, lotions, cologne, perfumes, powders, or oil on skin. Patient teach back successful and patient demonstrates knowledge of instruction.

## 2018-05-29 ENCOUNTER — HOSPITAL ENCOUNTER (OUTPATIENT)
Dept: SURGERY | Age: 63
Discharge: HOME | End: 2018-05-29

## 2018-05-31 ENCOUNTER — ANESTHESIA EVENT (OUTPATIENT)
Dept: SURGERY | Age: 63
End: 2018-05-31
Payer: MEDICARE

## 2018-06-04 ENCOUNTER — HOSPITAL ENCOUNTER (OUTPATIENT)
Age: 63
Setting detail: OUTPATIENT SURGERY
Discharge: HOME | End: 2018-06-04
Attending: OTOLARYNGOLOGY | Admitting: OTOLARYNGOLOGY
Payer: MEDICARE

## 2018-06-04 ENCOUNTER — ANESTHESIA (OUTPATIENT)
Dept: SURGERY | Age: 63
End: 2018-06-04
Payer: MEDICARE

## 2018-06-04 VITALS
TEMPERATURE: 98.6 F | WEIGHT: 232 LBS | OXYGEN SATURATION: 100 % | BODY MASS INDEX: 43.8 KG/M2 | DIASTOLIC BLOOD PRESSURE: 64 MMHG | HEIGHT: 61 IN | HEART RATE: 89 BPM | RESPIRATION RATE: 18 BRPM | SYSTOLIC BLOOD PRESSURE: 132 MMHG

## 2018-06-04 LAB
INR BLD: 1.1 (ref 0.9–1.2)
PT BLD: 13.2 SECS (ref 9.6–11.6)

## 2018-06-04 PROCEDURE — 76210000017 HC OR PH I REC 1.5 TO 2 HR: Performed by: OTOLARYNGOLOGY

## 2018-06-04 PROCEDURE — 74011250636 HC RX REV CODE- 250/636

## 2018-06-04 PROCEDURE — 76060000032 HC ANESTHESIA 0.5 TO 1 HR: Performed by: OTOLARYNGOLOGY

## 2018-06-04 PROCEDURE — 77030020782 HC GWN BAIR PAWS FLX 3M -B: Performed by: ANESTHESIOLOGY

## 2018-06-04 PROCEDURE — 77030032490 HC SLV COMPR SCD KNE COVD -B: Performed by: OTOLARYNGOLOGY

## 2018-06-04 PROCEDURE — 77030006907 HC BLD SNUS SHV MEDT -C: Performed by: OTOLARYNGOLOGY

## 2018-06-04 PROCEDURE — 77030031139 HC SUT VCRL2 J&J -A: Performed by: OTOLARYNGOLOGY

## 2018-06-04 PROCEDURE — 74011000250 HC RX REV CODE- 250: Performed by: ANESTHESIOLOGY

## 2018-06-04 PROCEDURE — 74011250637 HC RX REV CODE- 250/637: Performed by: OTOLARYNGOLOGY

## 2018-06-04 PROCEDURE — C1762 CONN TISS, HUMAN(INC FASCIA): HCPCS | Performed by: OTOLARYNGOLOGY

## 2018-06-04 PROCEDURE — 77030008703 HC TU ET UNCUF COVD -A: Performed by: ANESTHESIOLOGY

## 2018-06-04 PROCEDURE — 85610 PROTHROMBIN TIME: CPT

## 2018-06-04 PROCEDURE — 74011000250 HC RX REV CODE- 250: Performed by: OTOLARYNGOLOGY

## 2018-06-04 PROCEDURE — 74011250637 HC RX REV CODE- 250/637: Performed by: ANESTHESIOLOGY

## 2018-06-04 PROCEDURE — 76010000138 HC OR TIME 0.5 TO 1 HR: Performed by: OTOLARYNGOLOGY

## 2018-06-04 PROCEDURE — 74011250636 HC RX REV CODE- 250/636: Performed by: ANESTHESIOLOGY

## 2018-06-04 PROCEDURE — 74011000250 HC RX REV CODE- 250

## 2018-06-04 DEVICE — IMPLANTABLE DEVICE: Type: IMPLANTABLE DEVICE | Site: NOSE | Status: FUNCTIONAL

## 2018-06-04 RX ORDER — HYDROMORPHONE HYDROCHLORIDE 2 MG/ML
0.5 INJECTION, SOLUTION INTRAMUSCULAR; INTRAVENOUS; SUBCUTANEOUS
Status: DISCONTINUED | OUTPATIENT
Start: 2018-06-04 | End: 2018-06-04 | Stop reason: HOSPADM

## 2018-06-04 RX ORDER — SUCCINYLCHOLINE CHLORIDE 20 MG/ML
INJECTION INTRAMUSCULAR; INTRAVENOUS AS NEEDED
Status: DISCONTINUED | OUTPATIENT
Start: 2018-06-04 | End: 2018-06-04 | Stop reason: HOSPADM

## 2018-06-04 RX ORDER — PROPOFOL 10 MG/ML
INJECTION, EMULSION INTRAVENOUS AS NEEDED
Status: DISCONTINUED | OUTPATIENT
Start: 2018-06-04 | End: 2018-06-04 | Stop reason: HOSPADM

## 2018-06-04 RX ORDER — ROCURONIUM BROMIDE 10 MG/ML
INJECTION, SOLUTION INTRAVENOUS AS NEEDED
Status: DISCONTINUED | OUTPATIENT
Start: 2018-06-04 | End: 2018-06-04 | Stop reason: HOSPADM

## 2018-06-04 RX ORDER — LIDOCAINE HYDROCHLORIDE 10 MG/ML
0.1 INJECTION INFILTRATION; PERINEURAL AS NEEDED
Status: DISCONTINUED | OUTPATIENT
Start: 2018-06-04 | End: 2018-06-04 | Stop reason: HOSPADM

## 2018-06-04 RX ORDER — OXYCODONE HYDROCHLORIDE 5 MG/1
10 TABLET ORAL
Status: DISCONTINUED | OUTPATIENT
Start: 2018-06-04 | End: 2018-06-04 | Stop reason: HOSPADM

## 2018-06-04 RX ORDER — FENTANYL CITRATE 50 UG/ML
INJECTION, SOLUTION INTRAMUSCULAR; INTRAVENOUS AS NEEDED
Status: DISCONTINUED | OUTPATIENT
Start: 2018-06-04 | End: 2018-06-04 | Stop reason: HOSPADM

## 2018-06-04 RX ORDER — DIPHENHYDRAMINE HYDROCHLORIDE 50 MG/ML
12.5 INJECTION, SOLUTION INTRAMUSCULAR; INTRAVENOUS
Status: DISCONTINUED | OUTPATIENT
Start: 2018-06-04 | End: 2018-06-04 | Stop reason: HOSPADM

## 2018-06-04 RX ORDER — LIDOCAINE HYDROCHLORIDE AND EPINEPHRINE 10; 10 MG/ML; UG/ML
INJECTION, SOLUTION INFILTRATION; PERINEURAL AS NEEDED
Status: DISCONTINUED | OUTPATIENT
Start: 2018-06-04 | End: 2018-06-04 | Stop reason: HOSPADM

## 2018-06-04 RX ORDER — OXYCODONE HYDROCHLORIDE 5 MG/1
5 TABLET ORAL
Status: DISCONTINUED | OUTPATIENT
Start: 2018-06-04 | End: 2018-06-04 | Stop reason: HOSPADM

## 2018-06-04 RX ORDER — HYDROMORPHONE HYDROCHLORIDE 2 MG/ML
0.5 INJECTION, SOLUTION INTRAMUSCULAR; INTRAVENOUS; SUBCUTANEOUS
Status: COMPLETED | OUTPATIENT
Start: 2018-06-04 | End: 2018-06-04

## 2018-06-04 RX ORDER — EPHEDRINE SULFATE 50 MG/ML
INJECTION, SOLUTION INTRAVENOUS AS NEEDED
Status: DISCONTINUED | OUTPATIENT
Start: 2018-06-04 | End: 2018-06-04 | Stop reason: HOSPADM

## 2018-06-04 RX ORDER — FLUMAZENIL 0.1 MG/ML
0.2 INJECTION INTRAVENOUS AS NEEDED
Status: DISCONTINUED | OUTPATIENT
Start: 2018-06-04 | End: 2018-06-04 | Stop reason: HOSPADM

## 2018-06-04 RX ORDER — FAMOTIDINE 20 MG/1
20 TABLET, FILM COATED ORAL ONCE
Status: COMPLETED | OUTPATIENT
Start: 2018-06-04 | End: 2018-06-04

## 2018-06-04 RX ORDER — MIDAZOLAM HYDROCHLORIDE 1 MG/ML
2 INJECTION, SOLUTION INTRAMUSCULAR; INTRAVENOUS
Status: COMPLETED | OUTPATIENT
Start: 2018-06-04 | End: 2018-06-04

## 2018-06-04 RX ORDER — SODIUM CHLORIDE, SODIUM LACTATE, POTASSIUM CHLORIDE, CALCIUM CHLORIDE 600; 310; 30; 20 MG/100ML; MG/100ML; MG/100ML; MG/100ML
75 INJECTION, SOLUTION INTRAVENOUS CONTINUOUS
Status: DISCONTINUED | OUTPATIENT
Start: 2018-06-04 | End: 2018-06-04 | Stop reason: HOSPADM

## 2018-06-04 RX ORDER — DEXAMETHASONE SODIUM PHOSPHATE 4 MG/ML
INJECTION, SOLUTION INTRA-ARTICULAR; INTRALESIONAL; INTRAMUSCULAR; INTRAVENOUS; SOFT TISSUE AS NEEDED
Status: DISCONTINUED | OUTPATIENT
Start: 2018-06-04 | End: 2018-06-04 | Stop reason: HOSPADM

## 2018-06-04 RX ORDER — ACETAMINOPHEN 500 MG
1000 TABLET ORAL ONCE
Status: COMPLETED | OUTPATIENT
Start: 2018-06-04 | End: 2018-06-04

## 2018-06-04 RX ORDER — APREPITANT 40 MG/1
40 CAPSULE ORAL ONCE
Status: COMPLETED | OUTPATIENT
Start: 2018-06-04 | End: 2018-06-04

## 2018-06-04 RX ORDER — KETOROLAC TROMETHAMINE 30 MG/ML
30 INJECTION, SOLUTION INTRAMUSCULAR; INTRAVENOUS
Status: COMPLETED | OUTPATIENT
Start: 2018-06-04 | End: 2018-06-04

## 2018-06-04 RX ORDER — NALOXONE HYDROCHLORIDE 0.4 MG/ML
0.1 INJECTION, SOLUTION INTRAMUSCULAR; INTRAVENOUS; SUBCUTANEOUS
Status: DISCONTINUED | OUTPATIENT
Start: 2018-06-04 | End: 2018-06-04 | Stop reason: HOSPADM

## 2018-06-04 RX ORDER — ONDANSETRON 2 MG/ML
INJECTION INTRAMUSCULAR; INTRAVENOUS AS NEEDED
Status: DISCONTINUED | OUTPATIENT
Start: 2018-06-04 | End: 2018-06-04 | Stop reason: HOSPADM

## 2018-06-04 RX ORDER — OXYMETAZOLINE HCL 0.05 %
SPRAY, NON-AEROSOL (ML) NASAL AS NEEDED
Status: DISCONTINUED | OUTPATIENT
Start: 2018-06-04 | End: 2018-06-04 | Stop reason: HOSPADM

## 2018-06-04 RX ADMIN — FENTANYL CITRATE 25 MCG: 50 INJECTION, SOLUTION INTRAMUSCULAR; INTRAVENOUS at 08:41

## 2018-06-04 RX ADMIN — FENTANYL CITRATE 50 MCG: 50 INJECTION, SOLUTION INTRAMUSCULAR; INTRAVENOUS at 08:21

## 2018-06-04 RX ADMIN — KETOROLAC TROMETHAMINE 30 MG: 30 INJECTION, SOLUTION INTRAMUSCULAR at 09:50

## 2018-06-04 RX ADMIN — ROCURONIUM BROMIDE 5 MG: 10 INJECTION, SOLUTION INTRAVENOUS at 08:21

## 2018-06-04 RX ADMIN — EPHEDRINE SULFATE 5 MG: 50 INJECTION, SOLUTION INTRAVENOUS at 08:39

## 2018-06-04 RX ADMIN — HYDROMORPHONE HYDROCHLORIDE 0.5 MG: 2 INJECTION, SOLUTION INTRAMUSCULAR; INTRAVENOUS; SUBCUTANEOUS at 09:10

## 2018-06-04 RX ADMIN — PROPOFOL 180 MG: 10 INJECTION, EMULSION INTRAVENOUS at 08:21

## 2018-06-04 RX ADMIN — HYDROMORPHONE HYDROCHLORIDE 0.5 MG: 2 INJECTION, SOLUTION INTRAMUSCULAR; INTRAVENOUS; SUBCUTANEOUS at 09:20

## 2018-06-04 RX ADMIN — FENTANYL CITRATE 25 MCG: 50 INJECTION, SOLUTION INTRAMUSCULAR; INTRAVENOUS at 08:42

## 2018-06-04 RX ADMIN — DEXAMETHASONE SODIUM PHOSPHATE 10 MG: 4 INJECTION, SOLUTION INTRA-ARTICULAR; INTRALESIONAL; INTRAMUSCULAR; INTRAVENOUS; SOFT TISSUE at 08:21

## 2018-06-04 RX ADMIN — SODIUM CHLORIDE, SODIUM LACTATE, POTASSIUM CHLORIDE, AND CALCIUM CHLORIDE 75 ML/HR: 600; 310; 30; 20 INJECTION, SOLUTION INTRAVENOUS at 08:07

## 2018-06-04 RX ADMIN — HYDROMORPHONE HYDROCHLORIDE 0.5 MG: 2 INJECTION, SOLUTION INTRAMUSCULAR; INTRAVENOUS; SUBCUTANEOUS at 09:30

## 2018-06-04 RX ADMIN — APREPITANT 40 MG: 40 CAPSULE ORAL at 07:50

## 2018-06-04 RX ADMIN — FAMOTIDINE 20 MG: 20 TABLET ORAL at 07:50

## 2018-06-04 RX ADMIN — SUCCINYLCHOLINE CHLORIDE 140 MG: 20 INJECTION INTRAMUSCULAR; INTRAVENOUS at 08:21

## 2018-06-04 RX ADMIN — ACETAMINOPHEN 1000 MG: 500 TABLET, FILM COATED ORAL at 07:50

## 2018-06-04 RX ADMIN — MIDAZOLAM HYDROCHLORIDE 2 MG: 1 INJECTION, SOLUTION INTRAMUSCULAR; INTRAVENOUS at 08:07

## 2018-06-04 RX ADMIN — HYDROMORPHONE HYDROCHLORIDE 0.5 MG: 2 INJECTION, SOLUTION INTRAMUSCULAR; INTRAVENOUS; SUBCUTANEOUS at 09:15

## 2018-06-04 RX ADMIN — LIDOCAINE HYDROCHLORIDE 0.1 ML: 10 INJECTION, SOLUTION INFILTRATION; PERINEURAL at 08:08

## 2018-06-04 RX ADMIN — ONDANSETRON 4 MG: 2 INJECTION INTRAMUSCULAR; INTRAVENOUS at 08:21

## 2018-06-04 NOTE — ANESTHESIA PREPROCEDURE EVALUATION
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 Cardiovascular    Hypertension: poorly controlled              Exercise tolerance: >4 METS  Comments: History of DVT on coumadin   GI/Hepatic/Renal     GERD           Endo/Other        Morbid obesity and arthritis     Other Findings              Physical Exam    Airway  Mallampati: II  TM Distance: 4 - 6 cm  Neck ROM: normal range of motion   Mouth opening: Normal     Cardiovascular  Regular rate and rhythm,  S1 and S2 normal,  no murmur, click, rub, or gallop             Dental  No notable dental hx       Pulmonary  Breath sounds clear to auscultation               Abdominal  GI exam deferred       Other Findings            Anesthetic Plan    ASA: 3  Anesthesia type: general          Induction: Intravenous  Anesthetic plan and risks discussed with: Patient      Pt has been told she is difficult to intubate and she describes a \"turn\" in her trachea. She was easily intubated by DL per the record for the last 3 surgeries at [facility] over the last 3 yrs.

## 2018-06-04 NOTE — ANESTHESIA POSTPROCEDURE EVALUATION
Post-Anesthesia Evaluation and Assessment    Patient: Chayo Valderrama MRN: [id number]  SSN: [id number]    YOB: 1955  Age: 60 y.o. Sex: female       Cardiovascular Function/Vital Signs  Visit Vitals    /64    Pulse 89    Temp 37 °C (98.6 °F)    Resp 18    Ht 5' 1\" (1.549 m)    Wt 105.2 kg (232 lb)    SpO2 100%    BMI 43.84 kg/m2       Patient is status post general anesthesia for Procedure(s):  TURBINATE REDUCTION  BILATERAL INTERNAL NASAL VALVE REPAIR WITH  ALLOGRAFT. Nausea/Vomiting: None    Postoperative hydration reviewed and adequate. Pain:  Pain Scale 1: Numeric (0 - 10) (06/04/18 0930)  Pain Intensity 1: 7 (06/04/18 0930)   Managed    Neurological Status:   Neuro (WDL): Within Defined Limits (06/04/18 0858)  Neuro  Neurologic State: Alert (06/04/18 6582)  Orientation Level: Oriented X4 (06/04/18 0858)  Cognition: Follows commands (06/04/18 0858)  Speech: Clear (06/04/18 0858)  LUE Motor Response: Purposeful (06/04/18 0858)  LLE Motor Response: Purposeful (06/04/18 0858)  RUE Motor Response: Purposeful (06/04/18 0858)  RLE Motor Response: Purposeful (06/04/18 0858)   At baseline    Mental Status and Level of Consciousness: Arousable    Pulmonary Status:   O2 Device: 02 face tent (06/04/18 0928)   Adequate oxygenation and airway patent    Complications related to anesthesia: None    Post-anesthesia assessment completed.  No concerns    Signed By: Tres Flores MD     June 4, 2018

## 2018-06-04 NOTE — IP AVS SNAPSHOT
[address] [address] 
[phone] Patient: Deboraha Osgood MRN: [id number] RNC:[id number] About your hospitalization You were admitted on:  June 4, 2018 You last received care in the:  [facility] PACU You were discharged on:  June 4, 2018 Why you were hospitalized Your primary diagnosis was:  Not on File Follow-up Information Follow up With Details Comments Contact Info Paulette Contreras, [address] [facility] [address] [phone] Your Scheduled Appointments Tuesday June 12, 2018  9:15 AM EDT  
POST OP with DO PHONG Gregory ENT [facility]) [address] [place] [address]  
[phone] Discharge Orders None A check dea indicates which time of day the medication should be taken. My Medications ASK your doctor about these medications Instructions Each Dose to Equal  
 Morning Noon Evening Bedtime ADVIL 200 mg tablet Generic drug:  ibuprofen Your last dose was: Your next dose is: Take 200 mg by mouth every six (6) hours as needed. Indications: hold until after surgery 200 mg  
    
   
   
   
  
 allopurinol 300 mg tablet Commonly known as:  Tara Groom Your last dose was: Your next dose is: Take 1 Tab by mouth daily. 300 mg  
    
   
   
   
  
 ALPRAZolam 0.25 mg tablet Commonly known as:  Lajas Grange Your last dose was: Your next dose is: Take 1 Tab by mouth two (2) times daily as needed for Anxiety. Max Daily Amount: 0.5 mg.  
 0.25 mg  
    
   
   
   
  
 azithromycin 250 mg tablet Commonly known as:  Marcelo Ship Your last dose was: Your next dose is:    
   
   
      
   
   
   
  
 colchicine 0.6 mg tablet Commonly known as:  COLCRYS Your last dose was: Your next dose is:    
   
   
 Acute gout: 1.2 mg (2 tabs) PO at signs of attack then 0.6 mg (1 tab) 1 h later. No more than 3 tablets in the first 24 h. Continue taking 1 tablet daily. cyanocobalamin 1,000 mcg tablet Your last dose was: Your next dose is: Take 1,500 mcg by mouth daily. Indications: PREVENTION OF VITAMIN B12 DEFICIENCY, hold until after surgery 1500 mcg  
    
   
   
   
  
 fluconazole 100 mg tablet Commonly known as:  DIFLUCAN Your last dose was: Your next dose is: FDA advises cautious prescribing of oral fluconazole in pregnancy. Take 1 tablet by mouth now and take 1 tablet by mouth in 72 hours  
     
   
   
   
  
 gabapentin 300 mg capsule Commonly known as:  NEURONTIN Your last dose was: Your next dose is: Take 1 Cap by mouth three (3) times daily. Indications: NEUROPATHIC PAIN  
 300 mg HYDROmorphone 2 mg tablet Commonly known as:  DILAUDID Your last dose was: Your next dose is: Take 1 Tab by mouth every four (4) hours as needed for Pain. Max Daily Amount: 12 mg.  
 2 mg  
    
   
   
   
  
 lisinopril 20 mg tablet Commonly known as:  Gilmar Bear Your last dose was: Your next dose is: Take 1 Tab by mouth daily. Indications: hypertension 20 mg  
    
   
   
   
  
 LOVENOX 30 mg/0.3 mL injection Generic drug:  enoxaparin Your last dose was: Your next dose is:    
   
   
 by SubCUTAneous route daily. Unsure of dose  
     
   
   
   
  
 meperidine 50 mg tablet Commonly known as:  DEMEROL Your last dose was: Your next dose is: Take 1 tablet every 4-6 hours prn pain  
     
   
   
   
  
 neomycin-polymyxin-dexamethasone 3.5mg/mL-10,000 unit/mL-0.1 % ophthalmic suspension Commonly known as:  Odell Mason Your last dose was: Your next dose is: OMEGA 3 FISH OIL PO Your last dose was: Your next dose is: Take 2,100 mg by mouth daily. 2100 mg cap--1 cap po qd   Indications: am-hold until after surgery 2100 mg  
    
   
   
   
  
 omeprazole 40 mg capsule Commonly known as:  PRILOSEC Your last dose was: Your next dose is: Take 1 Cap by mouth two (2) times a day. Indications: gastroesophageal reflux disease 40 mg  
    
   
   
   
  
 * ondansetron 4 mg disintegrating tablet Commonly known as:  ZOFRAN ODT Your last dose was: Your next dose is: Take 1 Tab by mouth every eight (8) hours as needed for Nausea. 4 mg * ondansetron 4 mg disintegrating tablet Commonly known as:  ZOFRAN ODT Your last dose was: Your next dose is: Take 1 Tab by mouth every eight (8) hours as needed for Nausea. 4 mg  
    
   
   
   
  
 senna-docusate 8.6-50 mg per tablet Commonly known as:  SENOKOT-S Your last dose was: Your next dose is: Take 1 Tab by mouth daily. 1 Tab  
    
   
   
   
  
 traZODone 50 mg tablet Commonly known as:  Alisa Simon Your last dose was: Your next dose is: Take 1 Tab by mouth nightly. 50 mg  
    
   
   
   
  
 VITAMIN D3 PO Your last dose was: Your next dose is: Take 10,000 Units by mouth daily. Indications: am   hold until after surgery [address]10 Units  
    
   
   
   
  
 warfarin 6 mg tablet Commonly known as:  COUMADIN Your last dose was: Your next dose is: Take 1 Tab by mouth daily. 6 mg  
    
   
   
   
  
 zolpidem 10 mg tablet Commonly known as:  AMBIEN Your last dose was: Your next dose is: Take 1 Tab by mouth nightly as needed for Sleep.  Max Daily Amount: 10 mg.  
 10 mg  
    
   
   
   
 * Notice: This list has 2 medication(s) that are the same as other medications prescribed for you. Read the directions carefully, and ask your doctor or other care provider to review them with you. Opioid Education Prescription Opioids: What You Need to Know: 
 
 
ACTIVITY  Bathe or shower as directed by your doctor.  Avoid strenuous work and becoming overheated. Activity as directed by your doctor  Avoid bending over. DIET  Clear, cool liquids the first day; then soft diet the second day  Advance to regular diet on third day, unless your doctor orders otherwise.  No milk products or foods with red color dyes  If nausea and vomiting continues, call your doctor. PAIN 
 Take pain medication as directed by your doctor.  Call your doctor if pain is NOT relieved by medication.  DO NOT take aspirin or blood thinners until directed by your doctor. FOLLOW-UP PHONE CALLS  Calls will be made by nursing staff  If you have any problems, call your doctor as needed. CALL YOUR DOCTOR IF  Bleeding is expected the first few days and should gradually clear.  Temperature of 10 1°F or above  Green or yellow discharge from nose  Stiff neck, changes in vision or mental status, confusion, or excessive drowsiness AFTER ANESTHESIA  For the first 24 hours: DO NOT Drive, Drink alcoholic beverages, or Make important decisions.  Be aware of dizziness following anesthesia and while taking pain medication. After general anesthesia or intravenous sedation, for 24 hours or while taking prescription Narcotics: · Limit your activities · Do not drive and operate hazardous machinery · Do not make important personal or business decisions · Do  not drink alcoholic beverages · If you have not urinated within 8 hours after discharge, please contact your surgeon on call. *  Please give a list of your current medications to your Primary Care Provider. *  Please update this list whenever your medications are discontinued, doses are 
    changed, or new medications (including over-the-counter products) are added. *  Please carry medication information at all times in case of emergency situations. These are general instructions for a healthy lifestyle: No smoking/ No tobacco products/ Avoid exposure to second hand smoke Surgeon General's Warning:  Quitting smoking now greatly reduces serious risk to your health. Obesity, smoking, and sedentary lifestyle greatly increases your risk for illness A healthy diet, regular physical exercise & weight monitoring are important for maintaining a healthy lifestyle You may be retaining fluid if you have a history of heart failure or if you experience any of the following symptoms:  Weight gain of 3 pounds or more overnight or 5 pounds in a week, increased swelling in our hands or feet or shortness of breath while lying flat in bed. Please call your doctor as soon as you notice any of these symptoms; do not wait until your next office visit. Recognize signs and symptoms of STROKE: 
F-face looks uneven A-arms unable to move or move unevenly S-speech slurred or non-existent T-time-call 911 as soon as signs and symptoms begin-DO NOT go Back to bed or wait to see if you get better-TIME IS BRAIN. During your procedure today you received a medication called Emend which can decrease the effectiveness of oral contraceptives. We advise using an alternative form of birth control for the next 30 days. Introducing [facility]! Dear Briana DUFF: 
Thank you for requesting a Imagin[employer] account. Our records indicate that you already have an active Imaginova account.   You can access your account anytime at [email or web address] Did you know that you can access your hospital and ER discharge instructions at any time in Feeding Forward? You can also review all of your test results from your hospital stay or ER visit. Additional Information If you have questions, please visit the Frequently Asked Questions section of the Feeding Forward website at [email or web address] Remember, Feeding Forward is NOT to be used for urgent needs. For medical emergencies, dial 911. Now available from your iPhone and Android! Introducing Tien Lang As a [employer] patient, I wanted to make you aware of our electronic visit tool called Tien Lang. BPT allows you to connect within minutes with a medical provider 24 hours a day, seven days a week via a mobile device or tablet or logging into a secure website from your computer. You can access Tien Lang from anywhere in the [place]. A virtual visit might be right for you when you have a simple condition and feel like you just dont want to get out of bed, or cant get away from work for an appointment, when your regular [employer] provider is not available (evenings, weekends or holidays), or when youre out of town and need minor care. Electronic visits cost only $49 and if the [employer] provider determines a prescription is needed to treat your condition, one can be electronically transmitted to a nearby pharmacy*. Please take a moment to enroll today if you have not already done so. The enrollment process is free and takes just a few minutes. To enroll, please download the [employer] aretha to your tablet or phone, or visit [email or web address] to enroll on your computer.    
And, as an [address] [place] [address] patient with a GoChime account, the results of your visits will be scanned into your electronic medical record and your primary care provider will be able to view the scanned results. We urge you to continue to see your regular [facility] provider for your ongoing medical care. And while your primary care provider may not be the one available when you seek a [employer] virtual visit, the peace of mind you get from getting a real diagnosis real time can be priceless. For more information on Aqua Access, view our Frequently Asked Questions (FAQs) at [email or web address]. Sincerely, 
 
Francisco Garcia MD 
Chief Medical Officer [address] *:  certain medications cannot be prescribed via Aqua Access Providers Seen During Your Hospitalization Provider Specialty Primary office phone Claudetta Fujita,  Otolaryngology [phone] Your Primary Care Physician (PCP) Primary Care Physician Office Phone Office Fax [facility] [place] [phone] You are allergic to the following Allergen Reactions Clindamycin Anaphylaxis Iodinated Contrast- Oral And Iv Dye Anaphylaxis Ultram (Tramadol) Anaphylaxis Aloe Vera Hives Codeine Nausea and Vomiting Erythromycin Other (comments) Herpes infection; pt states from tip of tongue down to digestive system. Heparin Analogues Hives Pt states ok to take warfarin. Lanolin Hives Other Medication Hives Rash ! Allergic to most antibotics 2 dermabond 3 maracaine Vigamox (Moxifloxacin) Itching Aspartame Nausea and Vomiting Augmentin (Amoxicillin-Pot Clavulanate) Itching Cefzil (Cefprozil) Rash Fentanyl Nausea and Vomiting Morphine Nausea and Vomiting  
    
 Sulfa (Sulfonamide Antibiotics) Itching Varicella Virus Vacc Live (Pf) Rash Shingle vaccine Recent Documentation Height Weight BMI OB Status Smoking Status 1.549 m 105.2 kg 43.84 kg/m2 Postmenopausal Never Smoker Emergency Contacts Name Discharge Info Relation Home Work Mobile Bigg Amaral DISCHARGE CAREGIVER [3] Son [22]   [phone] ReinosoSander DISCHARGE CAREGIVER [3] Son [22]   [phone] Patient Belongings The following personal items are in your possession at time of discharge: 
  Dental Appliances: None Please provide this summary of care documentation to your next provider. Signatures-by signing, you are acknowledging that this After Visit Summary has been reviewed with you and you have received a copy. Patient Signature:  ____________________________________________________________ Date:  ____________________________________________________________  
  
Arcadia Benny Provider Signature:  ____________________________________________________________ Date:  ____________________________________________________________

## 2018-06-04 NOTE — DISCHARGE INSTRUCTIONS
INSTRUCTIONS FOLLOWING SINUS SURGERY    ACTIVITY   Bathe or shower as directed by your doctor.  Avoid strenuous work and becoming overheated. Activity as directed by your doctor   Avoid bending over. DIET   Clear, cool liquids the first day; then soft diet the second day   Advance to regular diet on third day, unless your doctor orders otherwise.  No milk products or foods with red color dyes   If nausea and vomiting continues, call your doctor. PAIN   Take pain medication as directed by your doctor.  Call your doctor if pain is NOT relieved by medication.  DO NOT take aspirin or blood thinners until directed by your doctor. FOLLOW-UP PHONE [address] will be made by nursing staff   If you have any problems, call your doctor as needed. CALL YOUR DOCTOR IF   Bleeding is expected the first few days and should gradually clear.  Temperature of 10 1°F or above   Green or yellow discharge from nose   Stiff neck, changes in vision or mental status, confusion, or excessive drowsiness    AFTER ANESTHESIA   For the first 24 hours: DO NOT Drive, Drink alcoholic beverages, or Make important decisions.  Be aware of dizziness following anesthesia and while taking pain medication. After general anesthesia or intravenous sedation, for 24 hours or while taking prescription Narcotics:  · Limit your activities  · Do not drive and operate hazardous machinery  · Do not make important personal or business decisions  · Do  not drink alcoholic beverages  · If you have not urinated within 8 hours after discharge, please contact your surgeon on call. *  Please give a list of your current medications to your Primary Care Provider. *  Please update this list whenever your medications are discontinued, doses are      changed, or new medications (including over-the-counter products) are added. *  Please carry medication information at all times in case of emergency situations.     These are general instructions for a healthy lifestyle:  No smoking/ No tobacco products/ Avoid exposure to second hand smoke  Surgeon General's Warning:  Quitting smoking now greatly reduces serious risk to your health. Obesity, smoking, and sedentary lifestyle greatly increases your risk for illness  A healthy diet, regular physical exercise & weight monitoring are important for maintaining a healthy lifestyle    You may be retaining fluid if you have a history of heart failure or if you experience any of the following symptoms:  Weight gain of 3 pounds or more overnight or 5 pounds in a week, increased swelling in our hands or feet or shortness of breath while lying flat in bed. Please call your doctor as soon as you notice any of these symptoms; do not wait until your next office visit. Recognize signs and symptoms of STROKE:  F-face looks uneven  A-arms unable to move or move unevenly  S-speech slurred or non-existent  T-time-call 911 as soon as signs and symptoms begin-DO NOT go       Back to bed or wait to see if you get better-TIME IS BRAIN. During your procedure today you received a medication called Emend which can decrease the effectiveness of oral contraceptives. We advise using an alternative form of birth control for the next 30 days.

## 2018-06-04 NOTE — OP NOTES
[facility] REPORT    Waymond Sandifer  MR#: [id number]  : 1955  ACCOUNT #: [de-identified]   DATE OF SERVICE: 2018    PREOPERATIVE DIAGNOSIS:  Nasal obstruction, nasal deformity, internal nasal valve collapse and nasal obstruction. POSTOPERATIVE DIAGNOSIS (ES):   Nasal obstruction, nasal deformity, internal nasal valve collapse and nasal obstruction. PROCEDURE PERFORMED:  Bilateral submucosal resection of the inferior turbinates and a bilateral internal nasal valve repair with  graft. SURGEON:  Trang Duckworth DO    ASSISTANT:  None. ANESTHESIA:  General.    COMPLICATIONS:  None. ESTIMATED BLOOD LOSS:  20 mL. HISTORY:  This is a 58-year-old female. She came to see me in the office with a complaint of nasal congestion and obstruction, along with some chronic sinus issues. She ended up undergoing a bilateral inferior turbinate reduction, along a maxillary balloon sinuplasty. She did have a deviated nasal septum, which was contributing to some of the nasal obstruction, but we did go with just the inferior turbinate reduction first.  She came back to the office and states that she still feels like she is congested. No pain, no bleeding, no drainage, no facial pressure. Smell is still decreased. Nothing medically seems to help, nasal steroid spray, she was using budesonide rinses, among other things. So, inspection of the inside of the nose does reveal her to be a deviated nasal septum to the left still, today complained of the obstructions bilateral.  I did do a [place] test on her, which she noted an open airway bilaterally during that portion of the test, but again, went right back to being obstructed when the test was over. So, this is very consistent with an internal nasal valve collapse.   Once that was recognized, even looking up the nose and having the patient take a deep breath, then revealed internal nasal valve collapse. So, based on the history and physical exam, it was my recommendation that she undergo a bilateral inferior turbinate reduction, along with a bilateral internal nasal valve repair with  graft. The procedure, risks and benefits were discussed with her in the office. All questions were answered and she is agreeable to the surgery. DESCRIPTION OF PROCEDURE:  The patient was identified in the preoperative waiting area. She was taken back to the operating room, where she underwent general anesthesia. Approximately 2 mL of 1% lidocaine with epinephrine were injected into the inferior turbinates and high in the nasal cavity, where the upper lateral cartilage would meet the septum, that was also done bilaterally. Afrin-soaked pledgets were placed in the inside of the nose and left there for a few minutes. A knife was used to make a small stab incision in the anterior portion of the inferior turbinates. Pamela Marts was used to create a small pocket between the bone of the inferior turbinate mucosa medially, and a microdebrider with a turbinate blade was used to reduce prominent bony tissue of the inferior turbinates bilaterally. Boies elevator was used to medialize and lateralize the inferior turbinates, and this gave a more open nasal airway bilaterally. A knife was then used to make a small stab incision, again near the caudal end of the upper lateral cartilage where it meets the septum. Pamela Marts was used to create a small pocket between the upper lateral cartilage and the septum. A piece of costal cartilage was then taken, and this was cut into 2 pieces, 1 cm x 2 mm x 2 mm, matching each other, and then each one was placed into the small tunnel that was created between the upper lateral cartilage and the septum. When they were inserted, then a 4-0 Vicryl stitch was used to close the surgical incision.   Some blood was suctioned from the nose and nasopharynx, and the patient was awakened and taken to the postop recovery room in stable condition.       DO BRYAN Lin / HAILY  D: 06/04/2018 08:53     T: 06/04/2018 09:24  JOB #: [id number]

## 2018-08-21 ENCOUNTER — HOSPITAL ENCOUNTER (OUTPATIENT)
Dept: SURGERY | Age: 63
Discharge: HOME | End: 2018-08-21

## 2018-08-23 VITALS — BODY MASS INDEX: 42.86 KG/M2 | HEIGHT: 61 IN | WEIGHT: 227 LBS

## 2018-08-23 NOTE — PERIOP NOTES
Patient verified name, , and surgery as listed in [facility]. Type 1b surgery, phone assessment complete. Orders NOT received. Labs per surgeon: none  Labs per anesthesia protocol: none  Most recent office note from Dr Rafael Villa, cardiologist 18 and recent EKG 1/15/18; printed and placed on chart for reference. Preop Coumadin protocol reviewed and noted in recent office visit to Dr. Qasim Mccormick; printed and placed on chart for reference. Patient answered medical/surgical history questions at their best of ability. All prior to admission medications documented in [facility]. Patient instructed to take the following medications the day of surgery according to anesthesia guidelines with a small sip of water: Omeprazole, Gabapentin, Xanax . Hold all vitamins 7 days prior to surgery and NSAIDS 5 days prior to surgery. Medications to be held Coumadin being held since 18 and pt to start Lovenox bridge 18 ~per Dr Love Dakins; Aspirin and Advil hold for 5 days prior to surgery. Patient instructed on the following:  Arrive at A Entrance, time of arrival to be called the day before by 1700  NPO after midnight including gum, mints, and ice chips  Responsible adult must drive patient to the hospital, stay during surgery, and patient will  need supervision 24 hours after anesthesia  Use antibacterial soap in shower the night before surgery and on the morning of surgery  Leave all valuables (money and jewelry) at home but bring insurance card and ID on       DOS  Do not wear make-up, nail polish, lotions, cologne, perfumes, powders, or oil on skin. Patient teach back successful and patient demonstrates knowledge of instruction.

## 2018-08-26 ENCOUNTER — ANESTHESIA EVENT (OUTPATIENT)
Dept: SURGERY | Age: 63
End: 2018-08-26
Payer: MEDICARE

## 2018-08-27 ENCOUNTER — HOSPITAL ENCOUNTER (OUTPATIENT)
Age: 63
Setting detail: OUTPATIENT SURGERY
Discharge: HOME | End: 2018-08-27
Attending: OTOLARYNGOLOGY | Admitting: OTOLARYNGOLOGY
Payer: MEDICARE

## 2018-08-27 ENCOUNTER — ANESTHESIA (OUTPATIENT)
Dept: SURGERY | Age: 63
End: 2018-08-27
Payer: MEDICARE

## 2018-08-27 VITALS
DIASTOLIC BLOOD PRESSURE: 57 MMHG | OXYGEN SATURATION: 96 % | RESPIRATION RATE: 16 BRPM | BODY MASS INDEX: 43.4 KG/M2 | SYSTOLIC BLOOD PRESSURE: 132 MMHG | WEIGHT: 227.8 LBS | HEART RATE: 83 BPM | TEMPERATURE: 98.5 F

## 2018-08-27 PROCEDURE — 77030006907 HC BLD SNUS SHV MEDT -C: Performed by: OTOLARYNGOLOGY

## 2018-08-27 PROCEDURE — 74011250636 HC RX REV CODE- 250/636

## 2018-08-27 PROCEDURE — 77030021678 HC GLIDESCP STAT DISP VERT -B: Performed by: ANESTHESIOLOGY

## 2018-08-27 PROCEDURE — 77030020782 HC GWN BAIR PAWS FLX 3M -B: Performed by: ANESTHESIOLOGY

## 2018-08-27 PROCEDURE — 76010000138 HC OR TIME 0.5 TO 1 HR: Performed by: OTOLARYNGOLOGY

## 2018-08-27 PROCEDURE — 77030031139 HC SUT VCRL2 J&J -A: Performed by: OTOLARYNGOLOGY

## 2018-08-27 PROCEDURE — 74011250636 HC RX REV CODE- 250/636: Performed by: ANESTHESIOLOGY

## 2018-08-27 PROCEDURE — 77030018390 HC SPNG HEMSTAT2 J&J -B: Performed by: OTOLARYNGOLOGY

## 2018-08-27 PROCEDURE — 77030008703 HC TU ET UNCUF COVD -A: Performed by: ANESTHESIOLOGY

## 2018-08-27 PROCEDURE — 74011000250 HC RX REV CODE- 250: Performed by: OTOLARYNGOLOGY

## 2018-08-27 PROCEDURE — 74011000250 HC RX REV CODE- 250

## 2018-08-27 PROCEDURE — 76210000006 HC OR PH I REC 0.5 TO 1 HR: Performed by: OTOLARYNGOLOGY

## 2018-08-27 PROCEDURE — 76060000032 HC ANESTHESIA 0.5 TO 1 HR: Performed by: OTOLARYNGOLOGY

## 2018-08-27 PROCEDURE — 74011250637 HC RX REV CODE- 250/637: Performed by: OTOLARYNGOLOGY

## 2018-08-27 RX ORDER — NEOSTIGMINE METHYLSULFATE 1 MG/ML
INJECTION INTRAVENOUS AS NEEDED
Status: DISCONTINUED | OUTPATIENT
Start: 2018-08-27 | End: 2018-08-27 | Stop reason: HOSPADM

## 2018-08-27 RX ORDER — LIDOCAINE HYDROCHLORIDE 10 MG/ML
0.1 INJECTION INFILTRATION; PERINEURAL AS NEEDED
Status: DISCONTINUED | OUTPATIENT
Start: 2018-08-27 | End: 2018-08-27 | Stop reason: HOSPADM

## 2018-08-27 RX ORDER — SODIUM CHLORIDE, SODIUM LACTATE, POTASSIUM CHLORIDE, CALCIUM CHLORIDE 600; 310; 30; 20 MG/100ML; MG/100ML; MG/100ML; MG/100ML
75 INJECTION, SOLUTION INTRAVENOUS CONTINUOUS
Status: DISCONTINUED | OUTPATIENT
Start: 2018-08-27 | End: 2018-08-27 | Stop reason: HOSPADM

## 2018-08-27 RX ORDER — SODIUM CHLORIDE 0.9 % (FLUSH) 0.9 %
5-10 SYRINGE (ML) INJECTION AS NEEDED
Status: DISCONTINUED | OUTPATIENT
Start: 2018-08-27 | End: 2018-08-27 | Stop reason: HOSPADM

## 2018-08-27 RX ORDER — ALBUTEROL SULFATE 0.83 MG/ML
2.5 SOLUTION RESPIRATORY (INHALATION) AS NEEDED
Status: DISCONTINUED | OUTPATIENT
Start: 2018-08-27 | End: 2018-08-27 | Stop reason: HOSPADM

## 2018-08-27 RX ORDER — ROCURONIUM BROMIDE 10 MG/ML
INJECTION, SOLUTION INTRAVENOUS AS NEEDED
Status: DISCONTINUED | OUTPATIENT
Start: 2018-08-27 | End: 2018-08-27 | Stop reason: HOSPADM

## 2018-08-27 RX ORDER — HYDROMORPHONE HYDROCHLORIDE 2 MG/ML
0.5 INJECTION, SOLUTION INTRAMUSCULAR; INTRAVENOUS; SUBCUTANEOUS
Status: DISCONTINUED | OUTPATIENT
Start: 2018-08-27 | End: 2018-08-27 | Stop reason: HOSPADM

## 2018-08-27 RX ORDER — FENTANYL CITRATE 50 UG/ML
INJECTION, SOLUTION INTRAMUSCULAR; INTRAVENOUS AS NEEDED
Status: DISCONTINUED | OUTPATIENT
Start: 2018-08-27 | End: 2018-08-27 | Stop reason: HOSPADM

## 2018-08-27 RX ORDER — ONDANSETRON 2 MG/ML
INJECTION INTRAMUSCULAR; INTRAVENOUS AS NEEDED
Status: DISCONTINUED | OUTPATIENT
Start: 2018-08-27 | End: 2018-08-27 | Stop reason: HOSPADM

## 2018-08-27 RX ORDER — OXYMETAZOLINE HCL 0.05 %
SPRAY, NON-AEROSOL (ML) NASAL AS NEEDED
Status: DISCONTINUED | OUTPATIENT
Start: 2018-08-27 | End: 2018-08-27 | Stop reason: HOSPADM

## 2018-08-27 RX ORDER — FENTANYL CITRATE 50 UG/ML
100 INJECTION, SOLUTION INTRAMUSCULAR; INTRAVENOUS ONCE
Status: DISCONTINUED | OUTPATIENT
Start: 2018-08-27 | End: 2018-08-27 | Stop reason: HOSPADM

## 2018-08-27 RX ORDER — DEXAMETHASONE SODIUM PHOSPHATE 4 MG/ML
INJECTION, SOLUTION INTRA-ARTICULAR; INTRALESIONAL; INTRAMUSCULAR; INTRAVENOUS; SOFT TISSUE AS NEEDED
Status: DISCONTINUED | OUTPATIENT
Start: 2018-08-27 | End: 2018-08-27 | Stop reason: HOSPADM

## 2018-08-27 RX ORDER — MIDAZOLAM HYDROCHLORIDE 1 MG/ML
2 INJECTION, SOLUTION INTRAMUSCULAR; INTRAVENOUS ONCE
Status: COMPLETED | OUTPATIENT
Start: 2018-08-27 | End: 2018-08-27

## 2018-08-27 RX ORDER — SODIUM CHLORIDE 0.9 % (FLUSH) 0.9 %
5-10 SYRINGE (ML) INJECTION EVERY 8 HOURS
Status: DISCONTINUED | OUTPATIENT
Start: 2018-08-27 | End: 2018-08-27 | Stop reason: HOSPADM

## 2018-08-27 RX ORDER — MIDAZOLAM HYDROCHLORIDE 1 MG/ML
2 INJECTION, SOLUTION INTRAMUSCULAR; INTRAVENOUS
Status: DISCONTINUED | OUTPATIENT
Start: 2018-08-27 | End: 2018-08-27 | Stop reason: HOSPADM

## 2018-08-27 RX ORDER — LIDOCAINE HYDROCHLORIDE AND EPINEPHRINE 10; 10 MG/ML; UG/ML
INJECTION, SOLUTION INFILTRATION; PERINEURAL AS NEEDED
Status: DISCONTINUED | OUTPATIENT
Start: 2018-08-27 | End: 2018-08-27 | Stop reason: HOSPADM

## 2018-08-27 RX ORDER — LIDOCAINE HYDROCHLORIDE 20 MG/ML
INJECTION, SOLUTION EPIDURAL; INFILTRATION; INTRACAUDAL; PERINEURAL AS NEEDED
Status: DISCONTINUED | OUTPATIENT
Start: 2018-08-27 | End: 2018-08-27 | Stop reason: HOSPADM

## 2018-08-27 RX ORDER — SODIUM CHLORIDE, SODIUM LACTATE, POTASSIUM CHLORIDE, CALCIUM CHLORIDE 600; 310; 30; 20 MG/100ML; MG/100ML; MG/100ML; MG/100ML
50 INJECTION, SOLUTION INTRAVENOUS CONTINUOUS
Status: DISCONTINUED | OUTPATIENT
Start: 2018-08-27 | End: 2018-08-27 | Stop reason: HOSPADM

## 2018-08-27 RX ORDER — PROPOFOL 10 MG/ML
INJECTION, EMULSION INTRAVENOUS AS NEEDED
Status: DISCONTINUED | OUTPATIENT
Start: 2018-08-27 | End: 2018-08-27 | Stop reason: HOSPADM

## 2018-08-27 RX ORDER — GLYCOPYRROLATE 0.2 MG/ML
INJECTION INTRAMUSCULAR; INTRAVENOUS AS NEEDED
Status: DISCONTINUED | OUTPATIENT
Start: 2018-08-27 | End: 2018-08-27 | Stop reason: HOSPADM

## 2018-08-27 RX ORDER — OXYCODONE HYDROCHLORIDE 5 MG/1
5 TABLET ORAL
Status: DISCONTINUED | OUTPATIENT
Start: 2018-08-27 | End: 2018-08-27 | Stop reason: HOSPADM

## 2018-08-27 RX ADMIN — MIDAZOLAM HYDROCHLORIDE 2 MG: 1 INJECTION, SOLUTION INTRAMUSCULAR; INTRAVENOUS at 16:15

## 2018-08-27 RX ADMIN — HYDROMORPHONE HYDROCHLORIDE 0.5 MG: 2 INJECTION, SOLUTION INTRAMUSCULAR; INTRAVENOUS; SUBCUTANEOUS at 17:54

## 2018-08-27 RX ADMIN — GLYCOPYRROLATE 0.4 MG: 0.2 INJECTION INTRAMUSCULAR; INTRAVENOUS at 17:27

## 2018-08-27 RX ADMIN — PROPOFOL 200 MG: 10 INJECTION, EMULSION INTRAVENOUS at 17:02

## 2018-08-27 RX ADMIN — ROCURONIUM BROMIDE 30 MG: 10 INJECTION, SOLUTION INTRAVENOUS at 17:02

## 2018-08-27 RX ADMIN — HYDROMORPHONE HYDROCHLORIDE 0.5 MG: 2 INJECTION, SOLUTION INTRAMUSCULAR; INTRAVENOUS; SUBCUTANEOUS at 18:03

## 2018-08-27 RX ADMIN — LIDOCAINE HYDROCHLORIDE 100 MG: 20 INJECTION, SOLUTION EPIDURAL; INFILTRATION; INTRACAUDAL; PERINEURAL at 17:02

## 2018-08-27 RX ADMIN — ONDANSETRON 4 MG: 2 INJECTION INTRAMUSCULAR; INTRAVENOUS at 17:15

## 2018-08-27 RX ADMIN — DEXAMETHASONE SODIUM PHOSPHATE 10 MG: 4 INJECTION, SOLUTION INTRA-ARTICULAR; INTRALESIONAL; INTRAMUSCULAR; INTRAVENOUS; SOFT TISSUE at 17:26

## 2018-08-27 RX ADMIN — NEOSTIGMINE METHYLSULFATE 3 MG: 1 INJECTION INTRAVENOUS at 17:27

## 2018-08-27 RX ADMIN — FENTANYL CITRATE 100 MCG: 50 INJECTION, SOLUTION INTRAMUSCULAR; INTRAVENOUS at 17:02

## 2018-08-27 RX ADMIN — SODIUM CHLORIDE, SODIUM LACTATE, POTASSIUM CHLORIDE, AND CALCIUM CHLORIDE: 600; 310; 30; 20 INJECTION, SOLUTION INTRAVENOUS at 16:55

## 2018-08-27 RX ADMIN — HYDROMORPHONE HYDROCHLORIDE 0.5 MG: 2 INJECTION, SOLUTION INTRAMUSCULAR; INTRAVENOUS; SUBCUTANEOUS at 17:47

## 2018-08-27 NOTE — ANESTHESIA POSTPROCEDURE EVALUATION
Post-Anesthesia Evaluation and Assessment    Patient: Shalini Conrad MRN: [id number]  SSN: [id number]    YOB: 1955  Age: 60 y.o. Sex: female       Cardiovascular Function/Vital Signs  Visit Vitals    /57    Pulse 83    Temp 36.9 °C (98.5 °F)    Resp 16    Wt 103.3 kg (227 lb 12.8 oz)    SpO2 96%    BMI 43.4 kg/m2       Patient is status post general anesthesia for Procedure(s):  TURBINATE REDUCTION   REPAIR OF VESTIBULAR STENOSSIS. Nausea/Vomiting: None    Postoperative hydration reviewed and adequate. Pain:  Pain Scale 1: Numeric (0 - 10) (08/27/18 1803)  Pain Intensity 1: 6 (08/27/18 1803)   Managed    Neurological Status:   Neuro (WDL): Exceptions to WDL (08/27/18 1741)  Neuro  Neurologic State: Alert (08/27/18 1746)  Orientation Level: Oriented to person;Oriented to place;Oriented to situation (08/27/18 1746)  Cognition: Follows commands (08/27/18 1746)  Speech: Clear (08/27/18 1746)  LUE Motor Response: Purposeful (08/27/18 1746)  LLE Motor Response: Purposeful (08/27/18 1746)  RUE Motor Response: Purposeful (08/27/18 1746)  RLE Motor Response: Purposeful (08/27/18 1746)   At baseline    Mental Status and Level of Consciousness: Arousable    Pulmonary Status:   O2 Device: Room air (08/27/18 1835)   Adequate oxygenation and airway patent    Complications related to anesthesia: None    Post-anesthesia assessment completed.  No concerns    Signed By: Jeannine Patel MD     August 27, 2018

## 2018-08-27 NOTE — ANESTHESIA PREPROCEDURE EVALUATION
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 Cardiovascular    Hypertension: poorly controlled              Exercise tolerance: >4 METS  Comments: History of DVT on coumadin   GI/Hepatic/Renal     GERD: well controlled           Endo/Other        Morbid obesity and arthritis     Other Findings              Physical Exam    Airway  Mallampati: II  TM Distance: 4 - 6 cm  Neck ROM: normal range of motion   Mouth opening: Normal     Cardiovascular  Regular rate and rhythm,  S1 and S2 normal,  no murmur, click, rub, or gallop             Dental  No notable dental hx       Pulmonary  Breath sounds clear to auscultation               Abdominal  GI exam deferred       Other Findings            Anesthetic Plan    ASA: 3  Anesthesia type: general          Induction: Intravenous  Anesthetic plan and risks discussed with: Patient      Pt has been told she is difficult to intubate and she describes a \"turn\" in her trachea. She was easily intubated by DL per the record for the last 3 surgeries at [facility] over the last 3 yrs.

## 2018-08-27 NOTE — IP AVS SNAPSHOT
[id number] 
[phone] Patient: Mayur Guerrero MRN: [id number] NXP:[id number] About your hospitalization You were admitted on:  August 27, 2018 You last received care in the:  [facility] PACU You were discharged on:  August 27, 2018 Why you were hospitalized Your primary diagnosis was:  Not on File Follow-up Information None Your Scheduled Appointments Tuesday September 04, 2018 12:00 PM EDT  
POST OP with DO PHONG Granger ENT [facility]) [address] [place] [address]  
[phone] Discharge Orders None A check dea indicates which time of day the medication should be taken. My Medications ASK your doctor about these medications Instructions Each Dose to Equal  
 Morning Noon Evening Bedtime ADVIL 200 mg tablet Generic drug:  ibuprofen Your last dose was: Your next dose is: Take 200 mg by mouth every six (6) hours as needed. Indications: hold until after surgery 200 mg  
    
   
   
   
  
 allopurinol 300 mg tablet Commonly known as:  Annie Bolus Your last dose was: Your next dose is: Take 1 Tab by mouth daily. 300 mg  
    
   
   
   
  
 ALPRAZolam 0.25 mg tablet Commonly known as:  Volanda Pronto Your last dose was: Your next dose is: Take 1 Tab by mouth two (2) times daily as needed for Anxiety. Max Daily Amount: 0.5 mg.  
 0.25 mg  
    
   
   
   
  
 colchicine 0.6 mg tablet Commonly known as:  COLCRYS Your last dose was: Your next dose is:    
   
   
 Acute gout: 1.2 mg (2 tabs) PO at signs of attack then 0.6 mg (1 tab) 1 h later. No more than 3 tablets in the first 24 h. Continue taking 1 tablet daily. cyanocobalamin 1,000 mcg tablet Your last dose was: Your next dose is: Take 1,500 mcg by mouth daily. Indications: PREVENTION OF VITAMIN B12 DEFICIENCY, hold until after surgery 1500 mcg DULoxetine 20 mg capsule Commonly known as:  CYMBALTA Your last dose was: Your next dose is: Take 1 Cap by mouth daily (with breakfast). Indications: NEUROPATHIC PAIN  
 20 mg  
    
   
   
   
  
 enoxaparin 30 mg/0.3 mL injection Commonly known as:  LOVENOX Your last dose was: Your next dose is:    
   
   
 Per protocol before surgery, patient has instructions  
     
   
   
   
  
 gabapentin 300 mg capsule Commonly known as:  NEURONTIN Your last dose was: Your next dose is: Take 1 Cap by mouth three (3) times daily. Indications: NEUROPATHIC PAIN  
 300 mg  
    
   
   
   
  
 lisinopril 20 mg tablet Commonly known as:  Gómezold Benitez Your last dose was: Your next dose is: Take 1 Tab by mouth daily. Indications: hypertension 20 mg  
    
   
   
   
  
 meperidine 50 mg tablet Commonly known as:  DEMEROL Your last dose was: Your next dose is:    
   
   
 1 tab q 4-6 Hours PRN SEVERE Pain  
     
   
   
   
  
 mupirocin 2 % ointment Commonly known as:  [employer] Your last dose was: Your next dose is:    
   
   
 Use as directed by DO COMPA Pedraza [address] Your last dose was: Your next dose is: Take 2,100 mg by mouth daily. 2100 mg cap--1 cap po qd   Indications: am-hold until after surgery 2100 mg  
    
   
   
   
  
 omeprazole 40 mg capsule Commonly known as:  PRILOSEC Your last dose was: Your next dose is: Take 1 Cap by mouth two (2) times a day. Indications: gastroesophageal reflux disease 40 mg  
    
   
   
   
  
 ondansetron 4 mg disintegrating tablet Commonly known as:  ZOFRAN ODT  
 Your last dose was: Your next dose is:    
   
   
 1 tab Q 8 Hours PRN N/V  Indications: PREVENTION OF POST-OPERATIVE NAUSEA AND VOMITING  
     
   
   
   
  
 tiZANidine 4 mg tablet Commonly known as:  Angeli Fulling Your last dose was: Your next dose is: Take 1 Tab by mouth three (3) times daily as needed. Indications: Muscle Spasm 4 mg  
    
   
   
   
  
 traZODone 50 mg tablet Commonly known as:  Junior Mu-ism Your last dose was: Your next dose is: Take 1 Tab by mouth nightly. Indications: insomnia associated with depression 50 mg  
    
   
   
   
  
 VITAMIN D3 PO Your last dose was: Your next dose is: Take 10,000 Units by mouth daily. Indications: am   hold until after surgery 58928 Units  
    
   
   
   
  
 warfarin 6 mg tablet Commonly known as:  COUMADIN Your last dose was: Your next dose is: Take 1 Tab by mouth daily. 6 mg  
    
   
   
   
  
 zolpidem 10 mg tablet Commonly known as:  AMBIEN Your last dose was: Your next dose is: Take 1 Tab by mouth nightly as needed for Sleep. Max Daily Amount: 10 mg.  
 10 mg Opioid Education Prescription Opioids: What You Need to Know: 
 
 
ACTIVITY  Bathe or shower as directed by your doctor.  Avoid strenuous work and becoming overheated. Activity as directed by your doctor  Avoid bending over. DIET  Clear, cool liquids the first day; then soft diet the second day  Advance to regular diet on third day, unless your doctor orders otherwise.  No milk products or foods with red color dyes  If nausea and vomiting continues, call your doctor. PAIN 
 Take pain medication as directed by your doctor.  Call your doctor if pain is NOT relieved by medication.  DO NOT take aspirin or blood thinners until directed by your doctor. FOLLOW-UP PHONE CALLS  Calls will be made by nursing staff  If you have any problems, call your doctor as needed. CALL YOUR DOCTOR IF  Bleeding is expected the first few days and should gradually clear.  Temperature of 10 1°F or above  Green or yellow discharge from nose  Stiff neck, changes in vision or mental status, confusion, or excessive drowsiness AFTER ANESTHESIA  For the first 24 hours: DO NOT Drive, Drink alcoholic beverages, or Make important decisions.  Be aware of dizziness following anesthesia and while taking pain medication. Introducing [facility]! Dear Amelie DUFF: 
Thank you for requesting a [employer] account. Our records indicate that you already have an active Calsys account. You can access your account anytime at [email or web address] Did you know that you can access your hospital and ER discharge instructions at any time in Calsys? You can also review all of your test results from your hospital stay or ER visit. Additional Information If you have questions, please visit the Frequently Asked Questions section of the Calsys website at [email or web address] Remember, Calsys is NOT to be used for urgent needs. For medical emergencies, dial 911. Now available from your iPhone and Android! Introducing Tien Lang As a [employer] patient, I wanted to make you aware of our electronic visit tool called Tien Lang. [employer] 24/7 allows you to connect within minutes with a medical provider 24 hours a day, seven days a week via a mobile device or tablet or logging into a secure website from your computer. You can access Tien Lang from anywhere in the [place]. A virtual visit might be right for you when you have a simple condition and feel like you just dont want to get out of bed, or cant get away from work for an appointment, when your regular [facility] provider is not available (evenings, weekends or holidays), or when youre out of town and need minor care. Electronic visits cost only $49 and if the [employer] provider determines a prescription is needed to treat your condition, one can be electronically transmitted to a nearby pharmacy*. Please take a moment to enroll today if you have not already done so. The enrollment process is free and takes just a few minutes. To enroll, please download the [employer] aretha to your tablet or phone, or visit [email or web address] to enroll on your computer. And, as an [address] [place] [address] patient with a [employer] account, the results of your visits will be scanned into your electronic medical record and your primary care provider will be able to view the scanned results. We urge you to continue to see your regular [facility] provider for your ongoing medical care. And while your primary care provider may not be the one available when you seek a Tien Lang virtual visit, the peace of mind you get from getting a real diagnosis real time can be priceless. For more information on Tien Lang, view our Frequently Asked Questions (FAQs) at [email or web address]. Sincerely, 
 
Joan Taveras MD 
Chief Medical Officer Prem Acuna *:  certain medications cannot be prescribed via Tien Lang Providers Seen During Your Hospitalization Provider Specialty Primary office phone Bri West DO Otolaryngology [phone] Your Primary Care Physician (PCP) Primary Care Physician Office Phone Office Fax [facility], [address] [facility] [phone] You are allergic to the following Allergen Reactions Clindamycin Anaphylaxis Iodinated Contrast- Oral And Iv Dye Anaphylaxis Ultram (Tramadol) Anaphylaxis Aloe Vera Hives Codeine Nausea and Vomiting Erythromycin Other (comments) Herpes infection; pt states from tip of tongue down to digestive system. Heparin Analogues Hives Pt states ok to take warfarin. Lanolin Hives Other Medication Hives Rash ! Allergic to most antibotics 2 dermabond 3 maracaine Vigamox (Moxifloxacin) Itching Aspartame Nausea and Vomiting Augmentin (Amoxicillin-Pot Clavulanate) Itching Cefzil (Cefprozil) Rash Fentanyl Nausea and Vomiting Morphine Nausea and Vomiting  
    
 Sulfa (Sulfonamide Antibiotics) Itching Varicella Virus Vacc Live (Pf) Rash Shingle vaccine Recent Documentation Weight BMI OB Status Smoking Status 103.3 kg 43.4 kg/m2 Hysterectomy Never Smoker Emergency Contacts Name Discharge Info Relation Home Work Mobile Bigg Amaral DISCHARGE CAREGIVER [3] Son [22] [phone] Sander Reinoso DISCHARGE CAREGIVER [3] Son [22]   [phone] Patient Belongings The following personal items are in your possession at time of discharge: 
  Dental Appliances: Lowers, Partials, Uppers Please provide this summary of care documentation to your next provider. Signatures-by signing, you are acknowledging that this After Visit Summary has been reviewed with you and you have received a copy. Patient Signature:  ____________________________________________________________ Date:  ____________________________________________________________  
  
Roney Ferraro Provider Signature:  ____________________________________________________________ Date:  ____________________________________________________________

## 2018-08-28 NOTE — OP NOTES
[facility] REPORT    Marlene Trujillo  MR#: [id number]  : 1955  ACCOUNT #: [de-identified]   DATE OF SERVICE: 2018    PREOPERATIVE DIAGNOSES:  Nasal obstruction, external nasal valve collapse, internal nasal valve narrowing and inferior turbinate hypertrophy. POSTOPERATIVE DIAGNOSES: Nasal obstruction, external nasal valve collapse, internal nasal valve narrowing and inferior turbinate hypertrophy. PROCEDURES PERFORMED:  Repair of vestibular stenosis including internal and external nasal valves, closed septoplasty and bilateral submucosal resection of the inferior turbinates. SURGEON:  Diana Sorensen DO    ASSISTANT:  None. ANESTHESIA:  General.    COMPLICATIONS:  None. ESTIMATED BLOOD LOSS:  15 mL. HISTORY:  This is a 78-year-old female who is well known to me. She originally came to see me because of nasal congestion and obstruction. She had a septoplasty done which really straightened out the inside of the nose but she still complained about nasal obstruction and reinspection revealed that she had developed an internal nasal valve collapse so I recommend she undergo an internal nasal valve repair which she went through and again, although she was doing better initially, this also led to nasal obstruction. So, she is having severe nasal obstruction, worse side left than right. She feels like the nose is always completely blocked and she has a lot of trouble sleeping. She has a lot of trouble even breathing during the day and she always feels like there is some type of obstruction causing the chronic mouth breathing which is leading to dry mouth. She is starting to snore. She is not able to sleep as she is waking up all the time. This is leading to fatigue and she has also complained about depression symptoms, so after bringing her back in the office, she was able to demonstrate very easily the nasal valve collapse.   Both external nasal valves are collapsing with minimal inhalation. I did take pictures of this nose but unfortunately an open rhinoplasty is not covered by insurance. I recommend she undergo internal nasal valve repair and inferior turbinate reduction again. So the procedure, risks and benefits were discussed with her in the office. All questions were answered and she was agreeable to the surgery. SURGERY ITSELF:  The patient was identified in the preoperative waiting area. She was taken back to the operating room where she underwent general anesthesia. Approximately 2 mL of 1% lidocaine with epinephrine were injected into the inferior turbinates, the floor of the nose at the nasal vestibule, the septum along with the columella. Afrin-soaked pledgets were placed in each side of the nose and left there for a few minutes. These were then removed. Upon very close inspection of the nose to see where there was any areas of obstruction, I was able to see that there was a mildly prominent maxillary crest on the left side which was narrowing the internal nasal valve. I was able to take a Boies elevator with pressure directly against that area and collapse the maxillary crest towards the right eliminating that obstruction on the left. I was able to take a knife and make a small stab incision and this was on the right side in hemitransfixion area, but the incision itself was about 3-4 mm long. I was able to create a submucosal pocket in the columella where I was able to grab a hold of the medial crura of the lower lateral cartilage and I was able to take those and sew them against each other narrowing the columella, again widening the external nasal valve. I then was able to take a knife and make an incision along the floor of the nasal vestibule right at the external nasal valve opening.   I was able to excise a wedge of tissue out of there, which allowed the inferior portion of the external nasal valve to open more widely and then a knife was used to make a small stab  incision in the anterior portion of the inferior turbinates. Barney Burak was used to create a small pocket between the bone of the inferior turbinate mucosa medially and a microdebrider with a turbinate blade was used to reduce the prominent bone and tissue of the inferior turbinates bilaterally. Boies elevator was used to medialize and lateralize the inferior turbinates and that gave her a widely patent nasal airway. So between working on the septum, the inferior turbinates, the columella and then floor of the nasal vestibule, I feel like I widened the nasal airway bilaterally, more on the left than the right, but at that point some small amount of blood was suctioned from the nose and nasopharynx and then the patient was awakened and taken to the postop recovery room in stable condition.       DO BRYAN Early / [place]  D: 08/27/2018 17:36     T: 08/27/2018 20:53  JOB #: [id number]

## 2018-11-27 ENCOUNTER — HOSPITAL ENCOUNTER (OUTPATIENT)
Age: 63
Setting detail: OUTPATIENT SURGERY
Discharge: HOME | End: 2018-11-27
Attending: SURGERY | Admitting: SURGERY
Payer: MEDICARE

## 2018-11-27 ENCOUNTER — ANESTHESIA EVENT (OUTPATIENT)
Dept: SURGERY | Age: 63
End: 2018-11-27
Payer: MEDICARE

## 2018-11-27 ENCOUNTER — ANESTHESIA (OUTPATIENT)
Dept: SURGERY | Age: 63
End: 2018-11-27
Payer: MEDICARE

## 2018-11-27 VITALS
DIASTOLIC BLOOD PRESSURE: 70 MMHG | WEIGHT: 231.4 LBS | BODY MASS INDEX: 43.72 KG/M2 | RESPIRATION RATE: 16 BRPM | SYSTOLIC BLOOD PRESSURE: 101 MMHG | TEMPERATURE: 98 F | HEART RATE: 88 BPM | OXYGEN SATURATION: 95 %

## 2018-11-27 DIAGNOSIS — L76.82 PAIN AT SURGICAL INCISION: Primary | ICD-10-CM

## 2018-11-27 LAB
INR BLD: 1 (ref 0.9–1.2)
PT BLD: 12.4 SECS (ref 9.6–11.6)

## 2018-11-27 PROCEDURE — 85610 PROTHROMBIN TIME: CPT

## 2018-11-27 PROCEDURE — 77030008467 HC STPLR SKN COVD -B: Performed by: SURGERY

## 2018-11-27 PROCEDURE — 77030032490 HC SLV COMPR SCD KNE COVD -B: Performed by: SURGERY

## 2018-11-27 PROCEDURE — 74011250636 HC RX REV CODE- 250/636: Performed by: ANESTHESIOLOGY

## 2018-11-27 PROCEDURE — 74011250637 HC RX REV CODE- 250/637: Performed by: ANESTHESIOLOGY

## 2018-11-27 PROCEDURE — 76010000132 HC OR TIME 2.5 TO 3 HR: Performed by: SURGERY

## 2018-11-27 PROCEDURE — 76210000020 HC REC RM PH II FIRST 0.5 HR: Performed by: SURGERY

## 2018-11-27 PROCEDURE — 77030008359 HC SPLNT NSL KT SHIP -B: Performed by: SURGERY

## 2018-11-27 PROCEDURE — 77030021678 HC GLIDESCP STAT DISP VERT -B: Performed by: ANESTHESIOLOGY

## 2018-11-27 PROCEDURE — 74011250636 HC RX REV CODE- 250/636

## 2018-11-27 PROCEDURE — 77030018836 HC SOL IRR NACL ICUM -A: Performed by: SURGERY

## 2018-11-27 PROCEDURE — 74011000258 HC RX REV CODE- 258: Performed by: SURGERY

## 2018-11-27 PROCEDURE — 74011000250 HC RX REV CODE- 250

## 2018-11-27 PROCEDURE — 76210000016 HC OR PH I REC 1 TO 1.5 HR: Performed by: SURGERY

## 2018-11-27 PROCEDURE — 74011000250 HC RX REV CODE- 250: Performed by: ANESTHESIOLOGY

## 2018-11-27 PROCEDURE — 77030008703 HC TU ET UNCUF COVD -A: Performed by: ANESTHESIOLOGY

## 2018-11-27 PROCEDURE — 74011250636 HC RX REV CODE- 250/636: Performed by: SURGERY

## 2018-11-27 PROCEDURE — 77030002916 HC SUT ETHLN J&J -A: Performed by: SURGERY

## 2018-11-27 PROCEDURE — 76060000037 HC ANESTHESIA 3 TO 3.5 HR: Performed by: SURGERY

## 2018-11-27 PROCEDURE — 77030013629 HC ELECTRD NDL STRY -B: Performed by: SURGERY

## 2018-11-27 PROCEDURE — 74011250637 HC RX REV CODE- 250/637: Performed by: SURGERY

## 2018-11-27 PROCEDURE — 77030002888 HC SUT CHRMC J&J -A: Performed by: SURGERY

## 2018-11-27 PROCEDURE — 74011000250 HC RX REV CODE- 250: Performed by: SURGERY

## 2018-11-27 RX ORDER — FENTANYL CITRATE 50 UG/ML
INJECTION, SOLUTION INTRAMUSCULAR; INTRAVENOUS AS NEEDED
Status: DISCONTINUED | OUTPATIENT
Start: 2018-11-27 | End: 2018-11-27 | Stop reason: HOSPADM

## 2018-11-27 RX ORDER — LIDOCAINE HYDROCHLORIDE 20 MG/ML
INJECTION, SOLUTION EPIDURAL; INFILTRATION; INTRACAUDAL; PERINEURAL AS NEEDED
Status: DISCONTINUED | OUTPATIENT
Start: 2018-11-27 | End: 2018-11-27 | Stop reason: HOSPADM

## 2018-11-27 RX ORDER — ACETAMINOPHEN 10 MG/ML
1000 INJECTION, SOLUTION INTRAVENOUS
Status: COMPLETED | OUTPATIENT
Start: 2018-11-27 | End: 2018-11-27

## 2018-11-27 RX ORDER — DEXAMETHASONE SODIUM PHOSPHATE 4 MG/ML
INJECTION, SOLUTION INTRA-ARTICULAR; INTRALESIONAL; INTRAMUSCULAR; INTRAVENOUS; SOFT TISSUE AS NEEDED
Status: DISCONTINUED | OUTPATIENT
Start: 2018-11-27 | End: 2018-11-27 | Stop reason: HOSPADM

## 2018-11-27 RX ORDER — CEFAZOLIN SODIUM/WATER 2 G/20 ML
2 SYRINGE (ML) INTRAVENOUS ONCE
Status: DISCONTINUED | OUTPATIENT
Start: 2018-11-27 | End: 2018-11-27

## 2018-11-27 RX ORDER — OXYCODONE AND ACETAMINOPHEN 5; 325 MG/1; MG/1
1 TABLET ORAL
Qty: 20 TAB | Refills: 0 | Status: SHIPPED | OUTPATIENT
Start: 2018-11-27 | End: 2018-12-12

## 2018-11-27 RX ORDER — SUCCINYLCHOLINE CHLORIDE 20 MG/ML
INJECTION INTRAMUSCULAR; INTRAVENOUS AS NEEDED
Status: DISCONTINUED | OUTPATIENT
Start: 2018-11-27 | End: 2018-11-27 | Stop reason: HOSPADM

## 2018-11-27 RX ORDER — NEOSTIGMINE METHYLSULFATE 1 MG/ML
INJECTION INTRAVENOUS AS NEEDED
Status: DISCONTINUED | OUTPATIENT
Start: 2018-11-27 | End: 2018-11-27 | Stop reason: HOSPADM

## 2018-11-27 RX ORDER — LIDOCAINE HYDROCHLORIDE 10 MG/ML
0.1 INJECTION INFILTRATION; PERINEURAL AS NEEDED
Status: DISCONTINUED | OUTPATIENT
Start: 2018-11-27 | End: 2018-11-27 | Stop reason: HOSPADM

## 2018-11-27 RX ORDER — NALOXONE HYDROCHLORIDE 0.4 MG/ML
0.1 INJECTION, SOLUTION INTRAMUSCULAR; INTRAVENOUS; SUBCUTANEOUS AS NEEDED
Status: DISCONTINUED | OUTPATIENT
Start: 2018-11-27 | End: 2018-11-27 | Stop reason: HOSPADM

## 2018-11-27 RX ORDER — SODIUM CHLORIDE, SODIUM LACTATE, POTASSIUM CHLORIDE, CALCIUM CHLORIDE 600; 310; 30; 20 MG/100ML; MG/100ML; MG/100ML; MG/100ML
125 INJECTION, SOLUTION INTRAVENOUS CONTINUOUS
Status: DISCONTINUED | OUTPATIENT
Start: 2018-11-27 | End: 2018-11-27 | Stop reason: HOSPADM

## 2018-11-27 RX ORDER — GLYCOPYRROLATE 0.2 MG/ML
INJECTION INTRAMUSCULAR; INTRAVENOUS AS NEEDED
Status: DISCONTINUED | OUTPATIENT
Start: 2018-11-27 | End: 2018-11-27 | Stop reason: HOSPADM

## 2018-11-27 RX ORDER — PROPOFOL 10 MG/ML
INJECTION, EMULSION INTRAVENOUS AS NEEDED
Status: DISCONTINUED | OUTPATIENT
Start: 2018-11-27 | End: 2018-11-27 | Stop reason: HOSPADM

## 2018-11-27 RX ORDER — SCOLOPAMINE TRANSDERMAL SYSTEM 1 MG/1
1 PATCH, EXTENDED RELEASE TRANSDERMAL ONCE
Status: DISCONTINUED | OUTPATIENT
Start: 2018-11-27 | End: 2018-11-27 | Stop reason: HOSPADM

## 2018-11-27 RX ORDER — SODIUM CHLORIDE 0.9 % (FLUSH) 0.9 %
5-10 SYRINGE (ML) INJECTION AS NEEDED
Status: DISCONTINUED | OUTPATIENT
Start: 2018-11-27 | End: 2018-11-27 | Stop reason: HOSPADM

## 2018-11-27 RX ORDER — SODIUM CHLORIDE 0.9 % (FLUSH) 0.9 %
5-10 SYRINGE (ML) INJECTION EVERY 8 HOURS
Status: DISCONTINUED | OUTPATIENT
Start: 2018-11-27 | End: 2018-11-27 | Stop reason: HOSPADM

## 2018-11-27 RX ORDER — EPHEDRINE SULFATE 50 MG/ML
INJECTION, SOLUTION INTRAVENOUS AS NEEDED
Status: DISCONTINUED | OUTPATIENT
Start: 2018-11-27 | End: 2018-11-27 | Stop reason: HOSPADM

## 2018-11-27 RX ORDER — LIDOCAINE HYDROCHLORIDE AND EPINEPHRINE 5; 5 MG/ML; UG/ML
INJECTION, SOLUTION INFILTRATION; PERINEURAL AS NEEDED
Status: DISCONTINUED | OUTPATIENT
Start: 2018-11-27 | End: 2018-11-27 | Stop reason: HOSPADM

## 2018-11-27 RX ORDER — KETOROLAC TROMETHAMINE 30 MG/ML
30 INJECTION, SOLUTION INTRAMUSCULAR; INTRAVENOUS AS NEEDED
Status: COMPLETED | OUTPATIENT
Start: 2018-11-27 | End: 2018-11-27

## 2018-11-27 RX ORDER — BACITRACIN 50000 [IU]/1
INJECTION, POWDER, FOR SOLUTION INTRAMUSCULAR AS NEEDED
Status: DISCONTINUED | OUTPATIENT
Start: 2018-11-27 | End: 2018-11-27 | Stop reason: HOSPADM

## 2018-11-27 RX ORDER — HYDROMORPHONE HYDROCHLORIDE 2 MG/ML
0.5 INJECTION, SOLUTION INTRAMUSCULAR; INTRAVENOUS; SUBCUTANEOUS
Status: DISCONTINUED | OUTPATIENT
Start: 2018-11-27 | End: 2018-11-27 | Stop reason: HOSPADM

## 2018-11-27 RX ORDER — ONDANSETRON 2 MG/ML
INJECTION INTRAMUSCULAR; INTRAVENOUS AS NEEDED
Status: DISCONTINUED | OUTPATIENT
Start: 2018-11-27 | End: 2018-11-27 | Stop reason: HOSPADM

## 2018-11-27 RX ORDER — OXYMETAZOLINE HCL 0.05 %
SPRAY, NON-AEROSOL (ML) NASAL AS NEEDED
Status: DISCONTINUED | OUTPATIENT
Start: 2018-11-27 | End: 2018-11-27 | Stop reason: HOSPADM

## 2018-11-27 RX ORDER — DOXYCYCLINE 100 MG/1
100 CAPSULE ORAL 2 TIMES DAILY
Qty: 20 CAP | Refills: 0 | Status: ON HOLD | OUTPATIENT
Start: 2018-11-27 | End: 2018-12-05 | Stop reason: SDUPTHER

## 2018-11-27 RX ORDER — MIDAZOLAM HYDROCHLORIDE 1 MG/ML
2 INJECTION, SOLUTION INTRAMUSCULAR; INTRAVENOUS
Status: COMPLETED | OUTPATIENT
Start: 2018-11-27 | End: 2018-11-27

## 2018-11-27 RX ORDER — ROCURONIUM BROMIDE 10 MG/ML
INJECTION, SOLUTION INTRAVENOUS AS NEEDED
Status: DISCONTINUED | OUTPATIENT
Start: 2018-11-27 | End: 2018-11-27 | Stop reason: HOSPADM

## 2018-11-27 RX ADMIN — SODIUM CHLORIDE, SODIUM LACTATE, POTASSIUM CHLORIDE, AND CALCIUM CHLORIDE 125 ML/HR: 600; 310; 30; 20 INJECTION, SOLUTION INTRAVENOUS at 07:22

## 2018-11-27 RX ADMIN — EPHEDRINE SULFATE 10 MG: 50 INJECTION, SOLUTION INTRAVENOUS at 07:57

## 2018-11-27 RX ADMIN — ONDANSETRON 4 MG: 2 INJECTION INTRAMUSCULAR; INTRAVENOUS at 10:08

## 2018-11-27 RX ADMIN — PROPOFOL 200 MG: 10 INJECTION, EMULSION INTRAVENOUS at 07:34

## 2018-11-27 RX ADMIN — HYDROMORPHONE HYDROCHLORIDE 0.5 MG: 2 INJECTION, SOLUTION INTRAMUSCULAR; INTRAVENOUS; SUBCUTANEOUS at 11:20

## 2018-11-27 RX ADMIN — EPHEDRINE SULFATE 15 MG: 50 INJECTION, SOLUTION INTRAVENOUS at 07:40

## 2018-11-27 RX ADMIN — MIDAZOLAM HYDROCHLORIDE 2 MG: 2 INJECTION, SOLUTION INTRAMUSCULAR; INTRAVENOUS at 07:19

## 2018-11-27 RX ADMIN — EPHEDRINE SULFATE 5 MG: 50 INJECTION, SOLUTION INTRAVENOUS at 08:55

## 2018-11-27 RX ADMIN — PROMETHAZINE HYDROCHLORIDE 12.5 MG: 25 INJECTION INTRAMUSCULAR; INTRAVENOUS at 11:05

## 2018-11-27 RX ADMIN — ROCURONIUM BROMIDE 5 MG: 10 INJECTION, SOLUTION INTRAVENOUS at 07:33

## 2018-11-27 RX ADMIN — GLYCOPYRROLATE 0.2 MG: 0.2 INJECTION INTRAMUSCULAR; INTRAVENOUS at 10:13

## 2018-11-27 RX ADMIN — EPHEDRINE SULFATE 10 MG: 50 INJECTION, SOLUTION INTRAVENOUS at 08:17

## 2018-11-27 RX ADMIN — ACETAMINOPHEN 1000 MG: 10 INJECTION, SOLUTION INTRAVENOUS at 11:21

## 2018-11-27 RX ADMIN — KETOROLAC TROMETHAMINE 30 MG: 30 INJECTION, SOLUTION INTRAMUSCULAR at 10:40

## 2018-11-27 RX ADMIN — ROCURONIUM BROMIDE 10 MG: 10 INJECTION, SOLUTION INTRAVENOUS at 07:41

## 2018-11-27 RX ADMIN — NEOSTIGMINE METHYLSULFATE 2 MG: 1 INJECTION INTRAVENOUS at 10:13

## 2018-11-27 RX ADMIN — HYDROMORPHONE HYDROCHLORIDE 0.5 MG: 2 INJECTION, SOLUTION INTRAMUSCULAR; INTRAVENOUS; SUBCUTANEOUS at 11:05

## 2018-11-27 RX ADMIN — DEXAMETHASONE SODIUM PHOSPHATE 4 MG: 4 INJECTION, SOLUTION INTRA-ARTICULAR; INTRALESIONAL; INTRAMUSCULAR; INTRAVENOUS; SOFT TISSUE at 08:36

## 2018-11-27 RX ADMIN — FENTANYL CITRATE 100 MCG: 50 INJECTION, SOLUTION INTRAMUSCULAR; INTRAVENOUS at 07:33

## 2018-11-27 RX ADMIN — HYDROMORPHONE HYDROCHLORIDE 0.5 MG: 2 INJECTION, SOLUTION INTRAMUSCULAR; INTRAVENOUS; SUBCUTANEOUS at 11:11

## 2018-11-27 RX ADMIN — SODIUM CHLORIDE, SODIUM LACTATE, POTASSIUM CHLORIDE, AND CALCIUM CHLORIDE: 600; 310; 30; 20 INJECTION, SOLUTION INTRAVENOUS at 08:25

## 2018-11-27 RX ADMIN — LIDOCAINE HYDROCHLORIDE 70 MG: 20 INJECTION, SOLUTION EPIDURAL; INFILTRATION; INTRACAUDAL; PERINEURAL at 07:33

## 2018-11-27 RX ADMIN — SUCCINYLCHOLINE CHLORIDE 160 MG: 20 INJECTION INTRAMUSCULAR; INTRAVENOUS at 07:34

## 2018-11-27 RX ADMIN — AZTREONAM 2 G: 2 INJECTION, POWDER, LYOPHILIZED, FOR SOLUTION INTRAMUSCULAR; INTRAVENOUS at 07:21

## 2018-11-27 NOTE — ANESTHESIA POSTPROCEDURE EVALUATION
Procedure(s): OPEN TIP RHINOPLASTY. Anesthesia Post Evaluation Patient location during evaluation: PACU Patient participation: complete - patient participated Level of consciousness: responsive to verbal stimuli Pain management: adequate Airway patency: patent Anesthetic complications: no 
Cardiovascular status: acceptable Respiratory status: acceptable Hydration status: euvolemic Visit Vitals /49 Pulse (!) 59 Temp 36.4 °C (97.6 °F) Resp 16 Wt 105 kg (231 lb 6.4 oz) SpO2 99% BMI 43.72 kg/m²

## 2018-11-27 NOTE — H&P
CC: internal nasal valve collapse and nasal airway insufficiency HPI: 57 yo with persisent nasal airway issues s/p closed rhinoplasty, septoplasty, turbinate reduction. Past Medical History:  
Diagnosis Date  Adverse effect of anesthesia   
 delayed awakening  Anticoagulated on Coumadin  Anxiety  Arthritis OA  Bleeding from nipple in female  Breast lump   
 left  Chronic pain   
 shoulders and knees  CKD (chronic kidney disease) 11/13/2015  
 resolved per patient  Current use of long term anticoagulation   
 pt takes coumadin due to hx of CVA  Difficult intubation   
 per patient \"I was told my trachea takes a funny turn and need a shorter tube. \"   
 GERD (gastroesophageal reflux disease)   
 well controlled with med-  
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  History of DVT of lower extremity 1973 (left leg)     2007 (right leg, following knee surgery)  HTN (hypertension)   
 controlled with lisinopril  Hx of gout   
 managed with medication  Hyperlipidemia   
 no medication  Insomnia  Left ear hearing loss 11/11/2016  Mitral valve prolapse   
 last echo ?2004 at [address] Morbid obesity [facility]) 11/13/15 BMI 44.9  MVP (mitral valve prolapse)  Pinched nerve in neck 9/2016  Post-operative nausea and vomiting   
 pt reports severe N/V : request e-mend or scopolamine patch; pt reports hoarseness, cough s/p intubation  PUD (peptic ulcer disease) 1980's  Right shoulder injury Reported injury while shopping 11/23/18  Stroke [facility]) 2007  
 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  
 after knee surgery  Torn rotator cuff   
 bilateral repairs.  DO NOT PULL PT WITH HER ARMS OR SHOULDERS  
  Unspecified adverse effect of anesthesia 1983  
 delayed awakening with D&C  Vitamin D deficiency Past Surgical History:  
Procedure Laterality Date  HX APPENDECTOMY  HX COLONOSCOPY  2015  
 polyps; pt states pre-cancerous [address], 2003 X3  
 HX DILATION AND CURETTAGE  2017  HX GYN  2017  
 cervical polyp removal  
 HX HEENT Bilateral at age 2  
 muscle & balances sx  HX HEENT  11/2017  
 resec of inferior turbinates & balloon sinuplasty Teri Mishra HEENT  [id number] & 2011  
 eye surgery  HX HYSTERECTOMY  2018  
 robotic hysterectomy, BSO  HX ORTHOPAEDIC Right   
 knee sx X2; torn meniscus & acl torn  HX SEPTOPLASTY  [id number]  HX TONSILLECTOMY  SHOULDER SURG PROC UNLISTED Bilateral last ones 2006 & 2007 \"multiple\"; last shoulder sx pt states came in for left shoulder repair; pt states while being moved from stretcher to OR table operating employee pulled on Right arm while pt telling her to stop & pt states right rotator cuff was torn Visit Vitals /69 (BP Patient Position: At rest) Pulse 82 Temp 97.6 °F (36.4 °C) Resp 18 Wt 105 kg (231 lb 6.4 oz) SpO2 94% BMI 43.72 kg/m² A&O x3 
RRR Resp clear Internal nasal valve collapse with narrow external nasal valve. + brandy A/P: 
Will proceed with open tip rhinoplasty, possible alar base widening, cartilage grafts from right ear.

## 2018-11-27 NOTE — DISCHARGE INSTRUCTIONS
Keep head elevated whenever possible. May use protected ice packs to nose. Avoid any sneezing, sneeze open mouth. Nasal saline irrigated 3 times a day. ACTIVITY  · As tolerated and as directed by your doctor. · Bathe or shower as directed by your doctor. DIET  · Clear liquids until no nausea or vomiting; then light diet for the first day. · Advance to regular diet on second day, unless your doctor orders otherwise. · If nausea and vomiting continues, call your doctor. PAIN  · Take pain medication as directed by your doctor. · Call your doctor if pain is NOT relieved by medication. · DO NOT take aspirin of blood thinners unless directed by your doctor. CALL YOUR DOCTOR IF   · Excessive bleeding that does not stop after holding pressure over the area  · Temperature of 101 degrees F or above  · Excessive redness, swelling or bruising, and/ or green or yellow, smelly discharge from incision    AFTER ANESTHESIA   · For the first 24 hours: DO NOT Drive, Drink alcoholic beverages, or Make important decisions. · Be aware of dizziness following anesthesia and while taking pain medication. After general anesthesia or intravenous sedation, for 24 hours or while taking prescription Narcotics:  · Limit your activities  · Do not drive and operate hazardous machinery  · Do not make important personal or business decisions  · Do  not drink alcoholic beverages  · If you have not urinated within 8 hours after discharge, please contact your surgeon on call. *  Please give a list of your current medications to your Primary Care Provider. *  Please update this list whenever your medications are discontinued, doses are      changed, or new medications (including over-the-counter products) are added. *  Please carry medication information at all times in case of emergency situations.       These are general instructions for a healthy lifestyle:    No smoking/ No tobacco products/ Avoid exposure to second hand smoke    Surgeon General's Warning:  Quitting smoking now greatly reduces serious risk to your health. Obesity, smoking, and sedentary lifestyle greatly increases your risk for illness    A healthy diet, regular physical exercise & weight monitoring are important for maintaining a healthy lifestyle    You may be retaining fluid if you have a history of heart failure or if you experience any of the following symptoms:  Weight gain of 3 pounds or more overnight or 5 pounds in a week, increased swelling in our hands or feet or shortness of breath while lying flat in bed. Please call your doctor as soon as you notice any of these symptoms; do not wait until your next office visit. Recognize signs and symptoms of STROKE:    F-face looks uneven    A-arms unable to move or move unevenly    S-speech slurred or non-existent    T-time-call 911 as soon as signs and symptoms begin-DO NOT go       Back to bed or wait to see if you get better-TIME IS BRAIN.

## 2018-11-27 NOTE — OP NOTES
[facility] REPORT    Jackson Gaines  MR#: [id number]  : 1955  ACCOUNT #: [de-identified]   DATE OF SERVICE: 2018    PREOPERATIVE DIAGNOSES:  Nasal obstruction with internal nasal valve collapse and external nasal valve insufficiency. POSTOPERATIVE DIAGNOSIS:  Nasal obstruction with internal nasal valve collapse and external nasal valve insufficiency. SURGEON:  Hero Dhaliwal MD    ANESTHESIA:  General anesthesia. SPECIMENS REMOVED:  None    PROCEDURE PERFORMED:  Open tip rhinoplasty with repositioning of the previously placed  graft and repair of lower lateral cartilages with interdomal     DESCRIPTION OF PROCEDURE:  Prior to procedure the patient was met in holding; once again, risks, benefits and alternatives were discussed. Informed consent was obtained. The patient was brought to the operating room. General endotracheal anesthesia was induced. Patient was positioned on the eye cart, slight neck extension, face was prepped and draped in the usual sterile fashion. Several pledgets with a combination of Afrin and 4% cocaine were inserted into the nose. A stair-step incision and intercartilaginous incision was marked on the nose, the nose was injected with approximately 3 mL of 0.5% lidocaine with epinephrine. After 10 minutes had been allowed to lapse, pledgets were removed. A transnasal exam was performed. Patient was noted to have a narrowed internal nasal valve. Approximately a 10 degree of intranasal valve angle. Posteriorly the patient was noted to have some mobility in the bony septum, but it was midline without any significant bone spurs. She had adequate 1 cm dorsal strut of cartilage from previous septoplasty. The intranasal cavity was cleaned with Betadine and attention was turned to the stair-step incision.       This was made sharply with an 11 blade taking care to enter only the skin and sparing the medial onur of the lower lateral cartilages. The lateral portion of the intercartilaginous incision was made by delivering the lower portion of the cartilage forward with the use of a double hook. Blunt dissection was used with tenotomy scissors to develop a tunnel at the caudal edge of the lower lateral cartilage. This was a somewhat tedious dissection given the previous surgeries that have been performed. Once this tunnel was fully developed the incision was completed with scissors traversing the vestibule. The dissection proceeded cranially identifying the cranial edge of the lower lateral cartilages. The lower lateral cartilages were noted to be somewhat disrupted on the right side just caudal to the medial genu, the lateral cartilage was continuous and on the left side, a small crescent of caudal tissue along the genu had appeared to be absent. Overall, the lower lateral cartilages were fairly flaccid, lacking support and collapsed laterally. Dissection proceeded superiorly and the previously placed  grafts were identified. These had migrated into an essentially dorsal onlay position on top of the cartilaginous septum and were no longer providing any  function no longer providing this graft function. They were dissected completely free, removed and saved for further use. A pocket was developed and the upper lateral cartilages were  from the septum on both sides. The previously retrieved grafts were copiously irrigated and then they were placed the pockets in a  graft fashion. The lower lateral cartilages were noted to intrinsically adopt a concave conformity. The  grafts were placed, they were secured with a series of 4-0 clear nylon sutures. The lower lateral cartilages were then reapproximated in the midline with interdomal sutures. This gave them appropriate support and corrected their inversion.   The discontinuous right lower lateral cartilage was repaired using a series of interrupted 4-0 nylon sutures. Skin redraped, and this additional support of the lower lateral cartilages had improved patency at the external nasal valve, with improvement at the internal nasal valve with the  grafts. Area was then further irrigated. Hemostasis was obtained. Closure was completed in the skin with a series of interrupted 5-0 nylons and intranasally with interrupted 4-0 chromic gut. The nose was copiously irrigated once again. Eyes were irrigated with BSS. A [place] splint was applied. The patient was awakened from anesthesia. ESTIMATED BLOOD LOSS:  Minimal.    COMPLICATIONS:  None.       Ozzie Edwards MD       [address] [place] [address] / [facility]  D: 11/27/2018 10:55     T: 11/27/2018 13:11  JOB #: [id number]

## 2018-11-27 NOTE — ANESTHESIA PREPROCEDURE EVALUATION
Anesthetic History Increased risk of difficult airway and PONV Review of Systems / Medical History Patient summary reviewed, nursing notes reviewed and pertinent labs reviewed Pulmonary Undiagnosed apnea Neuro/Psych  
 
 
CVA (2007- vision changes on left) Comments: \"optic eye stroke\"; blindness in left eye & complete loss of hearing to left ear Cardiovascular Hypertension: poorly controlled Exercise tolerance: >4 METS Comments: History of DVT on coumadin GI/Hepatic/Renal 
  
GERD Endo/Other Morbid obesity and arthritis Other Findings Physical Exam 
 
Airway Mallampati: II 
TM Distance: 4 - 6 cm Neck ROM: normal range of motion Mouth opening: Normal 
 
 Cardiovascular Regular rate and rhythm,  S1 and S2 normal,  no murmur, click, rub, or gallop Dental 
No notable dental hx Pulmonary Breath sounds clear to auscultation Abdominal 
GI exam deferred Other Findings Anesthetic Plan ASA: 3 Anesthesia type: general 
 
 
 
 
Induction: Intravenous Anesthetic plan and risks discussed with: Patient Pt has been told she is difficult to intubate and she describes a \"turn\" in her trachea. She was easily intubated by DL per the record for the last 3 surgeries at [facility] over the last 3 yrs.

## 2018-12-01 ENCOUNTER — APPOINTMENT (OUTPATIENT)
Dept: MRI IMAGING | Age: 63
End: 2018-12-01
Attending: INTERNAL MEDICINE
Payer: MEDICARE

## 2018-12-01 ENCOUNTER — APPOINTMENT (OUTPATIENT)
Dept: CT IMAGING | Age: 63
End: 2018-12-01
Attending: PSYCHIATRY & NEUROLOGY
Payer: MEDICARE

## 2018-12-01 ENCOUNTER — APPOINTMENT (OUTPATIENT)
Dept: ULTRASOUND IMAGING | Age: 63
End: 2018-12-01
Attending: INTERNAL MEDICINE
Payer: MEDICARE

## 2018-12-01 ENCOUNTER — HOSPITAL ENCOUNTER (OUTPATIENT)
Age: 63
Setting detail: OBSERVATION
Discharge: HOME HEALTH | End: 2018-12-05
Attending: EMERGENCY MEDICINE | Admitting: INTERNAL MEDICINE
Payer: MEDICARE

## 2018-12-01 ENCOUNTER — APPOINTMENT (OUTPATIENT)
Dept: CT IMAGING | Age: 63
End: 2018-12-01
Attending: EMERGENCY MEDICINE
Payer: MEDICARE

## 2018-12-01 DIAGNOSIS — I63.9 CEREBROVASCULAR ACCIDENT (CVA), UNSPECIFIED MECHANISM (HCC): Primary | ICD-10-CM

## 2018-12-01 DIAGNOSIS — I10 ESSENTIAL HYPERTENSION: ICD-10-CM

## 2018-12-01 DIAGNOSIS — R42 VERTIGO: ICD-10-CM

## 2018-12-01 DIAGNOSIS — R11.2 NAUSEA AND VOMITING, INTRACTABILITY OF VOMITING NOT SPECIFIED, UNSPECIFIED VOMITING TYPE: ICD-10-CM

## 2018-12-01 LAB
ALBUMIN SERPL-MCNC: 3.5 G/DL (ref 3.2–4.6)
ALBUMIN/GLOB SERPL: 1.1 {RATIO} (ref 1.2–3.5)
ALP SERPL-CCNC: 126 U/L (ref 50–136)
ALT SERPL-CCNC: 106 U/L (ref 12–65)
ANION GAP SERPL CALC-SCNC: 9 MMOL/L (ref 7–16)
AST SERPL-CCNC: 45 U/L (ref 15–37)
ATRIAL RATE: 66 BPM
BACTERIA URNS QL MICRO: ABNORMAL /HPF
BASOPHILS # BLD: 0 K/UL (ref 0–0.2)
BASOPHILS NFR BLD: 0 % (ref 0–2)
BILIRUB SERPL-MCNC: 0.7 MG/DL (ref 0.2–1.1)
BUN SERPL-MCNC: 22 MG/DL (ref 8–23)
CALCIUM SERPL-MCNC: 8.9 MG/DL (ref 8.3–10.4)
CALCULATED P AXIS, ECG09: 60 DEGREES
CALCULATED R AXIS, ECG10: 23 DEGREES
CALCULATED T AXIS, ECG11: 34 DEGREES
CASTS URNS QL MICRO: ABNORMAL /LPF
CHLORIDE SERPL-SCNC: 108 MMOL/L (ref 98–107)
CO2 SERPL-SCNC: 24 MMOL/L (ref 21–32)
CREAT SERPL-MCNC: 0.96 MG/DL (ref 0.6–1)
DIAGNOSIS, 93000: NORMAL
DIFFERENTIAL METHOD BLD: ABNORMAL
EOSINOPHIL # BLD: 0.2 K/UL (ref 0–0.8)
EOSINOPHIL NFR BLD: 1 % (ref 0.5–7.8)
EPI CELLS #/AREA URNS HPF: ABNORMAL /HPF
ERYTHROCYTE [DISTWIDTH] IN BLOOD BY AUTOMATED COUNT: 13.4 % (ref 11.9–14.6)
GLOBULIN SER CALC-MCNC: 3.3 G/DL (ref 2.3–3.5)
GLUCOSE BLD STRIP.AUTO-MCNC: 114 MG/DL (ref 65–100)
GLUCOSE SERPL-MCNC: 118 MG/DL (ref 65–100)
HCT VFR BLD AUTO: 43.6 % (ref 35.8–46.3)
HGB BLD-MCNC: 14.7 G/DL (ref 11.7–15.4)
IMM GRANULOCYTES # BLD: 0.1 K/UL (ref 0–0.5)
IMM GRANULOCYTES NFR BLD AUTO: 1 % (ref 0–5)
INR BLD: 1.5 (ref 0.9–1.2)
LYMPHOCYTES # BLD: 5.7 K/UL (ref 0.5–4.6)
LYMPHOCYTES NFR BLD: 47 % (ref 13–44)
MCH RBC QN AUTO: 31 PG (ref 26.1–32.9)
MCHC RBC AUTO-ENTMCNC: 33.7 G/DL (ref 31.4–35)
MCV RBC AUTO: 92 FL (ref 79.6–97.8)
MONOCYTES # BLD: 0.9 K/UL (ref 0.1–1.3)
MONOCYTES NFR BLD: 8 % (ref 4–12)
NEUTS SEG # BLD: 5.4 K/UL (ref 1.7–8.2)
NEUTS SEG NFR BLD: 44 % (ref 43–78)
NRBC # BLD: 0 K/UL (ref 0–0.2)
P-R INTERVAL, ECG05: 136 MS
PLATELET # BLD AUTO: 255 K/UL (ref 150–450)
PMV BLD AUTO: 10 FL (ref 9.4–12.3)
POTASSIUM SERPL-SCNC: 3.9 MMOL/L (ref 3.5–5.1)
PROT SERPL-MCNC: 6.8 G/DL (ref 6.3–8.2)
PT BLD: 18 SECS (ref 9.6–11.6)
Q-T INTERVAL, ECG07: 392 MS
QRS DURATION, ECG06: 70 MS
QTC CALCULATION (BEZET), ECG08: 410 MS
RBC # BLD AUTO: 4.74 M/UL (ref 4.05–5.2)
RBC #/AREA URNS HPF: ABNORMAL /HPF
SODIUM SERPL-SCNC: 141 MMOL/L (ref 136–145)
TROPONIN I BLD-MCNC: 0.08 NG/ML (ref 0.02–0.05)
VENTRICULAR RATE, ECG03: 66 BPM
WBC # BLD AUTO: 12.2 K/UL (ref 4.3–11.1)
WBC URNS QL MICRO: ABNORMAL /HPF

## 2018-12-01 PROCEDURE — 70450 CT HEAD/BRAIN W/O DYE: CPT

## 2018-12-01 PROCEDURE — 77030020263 HC SOL INJ SOD CL0.9% LFCR 1000ML

## 2018-12-01 PROCEDURE — 84484 ASSAY OF TROPONIN QUANT: CPT

## 2018-12-01 PROCEDURE — 74011250637 HC RX REV CODE- 250/637: Performed by: INTERNAL MEDICINE

## 2018-12-01 PROCEDURE — 77030033269 HC SLV COMPR SCD KNE2 CARD -B

## 2018-12-01 PROCEDURE — 99223 1ST HOSP IP/OBS HIGH 75: CPT | Performed by: PSYCHIATRY & NEUROLOGY

## 2018-12-01 PROCEDURE — 82962 GLUCOSE BLOOD TEST: CPT

## 2018-12-01 PROCEDURE — 74011250636 HC RX REV CODE- 250/636: Performed by: EMERGENCY MEDICINE

## 2018-12-01 PROCEDURE — 93005 ELECTROCARDIOGRAM TRACING: CPT | Performed by: EMERGENCY MEDICINE

## 2018-12-01 PROCEDURE — 96375 TX/PRO/DX INJ NEW DRUG ADDON: CPT

## 2018-12-01 PROCEDURE — 81015 MICROSCOPIC EXAM OF URINE: CPT

## 2018-12-01 PROCEDURE — 74011000250 HC RX REV CODE- 250: Performed by: EMERGENCY MEDICINE

## 2018-12-01 PROCEDURE — 86580 TB INTRADERMAL TEST: CPT | Performed by: INTERNAL MEDICINE

## 2018-12-01 PROCEDURE — 74011250636 HC RX REV CODE- 250/636: Performed by: INTERNAL MEDICINE

## 2018-12-01 PROCEDURE — 93880 EXTRACRANIAL BILAT STUDY: CPT

## 2018-12-01 PROCEDURE — 99285 EMERGENCY DEPT VISIT HI MDM: CPT | Performed by: EMERGENCY MEDICINE

## 2018-12-01 PROCEDURE — 87086 URINE CULTURE/COLONY COUNT: CPT

## 2018-12-01 PROCEDURE — 96376 TX/PRO/DX INJ SAME DRUG ADON: CPT

## 2018-12-01 PROCEDURE — 80053 COMPREHEN METABOLIC PANEL: CPT

## 2018-12-01 PROCEDURE — 65660000000 HC RM CCU STEPDOWN

## 2018-12-01 PROCEDURE — 93306 TTE W/DOPPLER COMPLETE: CPT

## 2018-12-01 PROCEDURE — 96361 HYDRATE IV INFUSION ADD-ON: CPT

## 2018-12-01 PROCEDURE — 70551 MRI BRAIN STEM W/O DYE: CPT

## 2018-12-01 PROCEDURE — 65390000012 HC CONDITION CODE 44 OBSERVATION

## 2018-12-01 PROCEDURE — 74011000302 HC RX REV CODE- 302: Performed by: INTERNAL MEDICINE

## 2018-12-01 PROCEDURE — 85610 PROTHROMBIN TIME: CPT

## 2018-12-01 PROCEDURE — 96374 THER/PROPH/DIAG INJ IV PUSH: CPT | Performed by: EMERGENCY MEDICINE

## 2018-12-01 PROCEDURE — 85025 COMPLETE CBC W/AUTO DIFF WBC: CPT

## 2018-12-01 RX ORDER — PANTOPRAZOLE SODIUM 40 MG/1
40 TABLET, DELAYED RELEASE ORAL 2 TIMES DAILY
Status: DISCONTINUED | OUTPATIENT
Start: 2018-12-01 | End: 2018-12-03

## 2018-12-01 RX ORDER — SODIUM CHLORIDE 0.9 % (FLUSH) 0.9 %
5-10 SYRINGE (ML) INJECTION EVERY 8 HOURS
Status: DISCONTINUED | OUTPATIENT
Start: 2018-12-01 | End: 2018-12-05 | Stop reason: HOSPADM

## 2018-12-01 RX ORDER — LISINOPRIL 20 MG/1
20 TABLET ORAL EVERY EVENING
Status: DISCONTINUED | OUTPATIENT
Start: 2018-12-01 | End: 2018-12-05 | Stop reason: HOSPADM

## 2018-12-01 RX ORDER — ASPIRIN 325 MG
325 TABLET, DELAYED RELEASE (ENTERIC COATED) ORAL DAILY
Status: DISCONTINUED | OUTPATIENT
Start: 2018-12-01 | End: 2018-12-05 | Stop reason: HOSPADM

## 2018-12-01 RX ORDER — ONDANSETRON 2 MG/ML
4 INJECTION INTRAMUSCULAR; INTRAVENOUS
Status: DISCONTINUED | OUTPATIENT
Start: 2018-12-01 | End: 2018-12-05 | Stop reason: HOSPADM

## 2018-12-01 RX ORDER — SODIUM CHLORIDE 9 MG/ML
75 INJECTION, SOLUTION INTRAVENOUS CONTINUOUS
Status: DISCONTINUED | OUTPATIENT
Start: 2018-12-01 | End: 2018-12-05 | Stop reason: HOSPADM

## 2018-12-01 RX ORDER — ASPIRIN 81 MG/1
81 TABLET ORAL DAILY
COMMUNITY
End: 2019-10-03

## 2018-12-01 RX ORDER — DOXYCYCLINE 100 MG/1
100 CAPSULE ORAL 2 TIMES DAILY
Status: DISCONTINUED | OUTPATIENT
Start: 2018-12-01 | End: 2018-12-05 | Stop reason: HOSPADM

## 2018-12-01 RX ORDER — ALLOPURINOL 300 MG/1
300 TABLET ORAL
Status: DISCONTINUED | OUTPATIENT
Start: 2018-12-01 | End: 2018-12-05 | Stop reason: HOSPADM

## 2018-12-01 RX ORDER — ACETAMINOPHEN 325 MG/1
650 TABLET ORAL
Status: DISCONTINUED | OUTPATIENT
Start: 2018-12-01 | End: 2018-12-05 | Stop reason: HOSPADM

## 2018-12-01 RX ORDER — ATORVASTATIN CALCIUM 40 MG/1
40 TABLET, FILM COATED ORAL DAILY
Status: DISCONTINUED | OUTPATIENT
Start: 2018-12-01 | End: 2018-12-05 | Stop reason: HOSPADM

## 2018-12-01 RX ORDER — TEMAZEPAM 15 MG/1
30 CAPSULE ORAL
Status: DISCONTINUED | OUTPATIENT
Start: 2018-12-01 | End: 2018-12-02

## 2018-12-01 RX ORDER — PREGABALIN 50 MG/1
50 CAPSULE ORAL 2 TIMES DAILY
Status: DISCONTINUED | OUTPATIENT
Start: 2018-12-01 | End: 2018-12-05 | Stop reason: HOSPADM

## 2018-12-01 RX ORDER — DULOXETIN HYDROCHLORIDE 60 MG/1
60 CAPSULE, DELAYED RELEASE ORAL DAILY
Status: DISCONTINUED | OUTPATIENT
Start: 2018-12-01 | End: 2018-12-05 | Stop reason: HOSPADM

## 2018-12-01 RX ORDER — SODIUM CHLORIDE 0.9 % (FLUSH) 0.9 %
5-10 SYRINGE (ML) INJECTION AS NEEDED
Status: DISCONTINUED | OUTPATIENT
Start: 2018-12-01 | End: 2018-12-05 | Stop reason: HOSPADM

## 2018-12-01 RX ORDER — OXYCODONE AND ACETAMINOPHEN 5; 325 MG/1; MG/1
1 TABLET ORAL
Status: DISCONTINUED | OUTPATIENT
Start: 2018-12-01 | End: 2018-12-05 | Stop reason: HOSPADM

## 2018-12-01 RX ADMIN — TEMAZEPAM 30 MG: 15 CAPSULE ORAL at 21:07

## 2018-12-01 RX ADMIN — ONDANSETRON HYDROCHLORIDE 4 MG: 2 INJECTION, SOLUTION INTRAVENOUS at 10:40

## 2018-12-01 RX ADMIN — ONDANSETRON HYDROCHLORIDE 4 MG: 2 INJECTION, SOLUTION INTRAVENOUS at 18:29

## 2018-12-01 RX ADMIN — OXYCODONE AND ACETAMINOPHEN 1 TABLET: 5; 325 TABLET ORAL at 18:29

## 2018-12-01 RX ADMIN — PREGABALIN 50 MG: 50 CAPSULE ORAL at 18:29

## 2018-12-01 RX ADMIN — SODIUM CHLORIDE 75 ML/HR: 900 INJECTION, SOLUTION INTRAVENOUS at 09:34

## 2018-12-01 RX ADMIN — Medication 5 ML: at 21:11

## 2018-12-01 RX ADMIN — ALLOPURINOL 300 MG: 300 TABLET ORAL at 21:08

## 2018-12-01 RX ADMIN — PANTOPRAZOLE SODIUM 40 MG: 40 TABLET, DELAYED RELEASE ORAL at 21:08

## 2018-12-01 RX ADMIN — ATORVASTATIN CALCIUM 40 MG: 40 TABLET, FILM COATED ORAL at 10:40

## 2018-12-01 RX ADMIN — DOXYCYCLINE HYCLATE 100 MG: 100 CAPSULE ORAL at 18:29

## 2018-12-01 RX ADMIN — LISINOPRIL 20 MG: 20 TABLET ORAL at 18:30

## 2018-12-01 RX ADMIN — PROMETHAZINE HYDROCHLORIDE 25 MG: 25 INJECTION INTRAMUSCULAR; INTRAVENOUS at 05:03

## 2018-12-01 RX ADMIN — ASPIRIN 325 MG: 325 TABLET, DELAYED RELEASE ORAL at 10:40

## 2018-12-01 RX ADMIN — TUBERCULIN PURIFIED PROTEIN DERIVATIVE 5 UNITS: 5 INJECTION, SOLUTION INTRADERMAL at 10:43

## 2018-12-01 RX ADMIN — WARFARIN SODIUM 6 MG: 5 TABLET ORAL at 18:29

## 2018-12-01 RX ADMIN — Medication 5 ML: at 10:00

## 2018-12-01 NOTE — ROUTINE PROCESS
TRANSFER - OUT REPORT: 
 
Verbal report given to Luh You RN on Eros Go  being transferred to 7th floor for routine progression of care Report consisted of patients Situation, Background, Assessment and  
Recommendations(SBAR). Information from the following report(s) ED Summary was reveiwed with the receiving nurse. Opportunity for questions and clarification was provided. Ischemic Stroke without Activase/TIA BP Parameters: Less Than 220/120 for 24 hours, then consult MD for parameters Controlled With: Not a canidate for TPA Dysphagia Screen Completed: Yes: Fail NIH Stroke Scale Complete: Yes: 7 Frequency of Vital Signs: per protocol Frequency of Neuro Checks: per protocol

## 2018-12-01 NOTE — ED PROVIDER NOTES
Patient is a 59-year-old female  Who is coming in with right-sided facial droop as well as some right leg weakness. Symptoms started about 10:30 last night. She does have a history of having a left sided visual deficit from an old stroke. She does take Coumadin. She also reports some headaches and vertigo and nausea with some vomiting. The history is provided by the patient. Past Medical History:  
Diagnosis Date  Adverse effect of anesthesia   
 delayed awakening  Anticoagulated on Coumadin  Anxiety  Arthritis OA  Bleeding from nipple in female  Breast lump   
 left  Chronic pain   
 shoulders and knees  CKD (chronic kidney disease) 11/13/2015  
 resolved per patient  Current use of long term anticoagulation   
 pt takes coumadin due to hx of CVA  Difficult intubation   
 per patient \"I was told my trachea takes a funny turn and need a shorter tube. \"   
 GERD (gastroesophageal reflux disease)   
 well controlled with med-  
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  History of DVT of lower extremity 1973 (left leg)     2007 (right leg, following knee surgery)  HTN (hypertension)   
 controlled with lisinopril  Hx of gout   
 managed with medication  Hyperlipidemia   
 no medication  Insomnia  Left ear hearing loss 11/11/2016  Mitral valve prolapse   
 last echo ?2004 at [address] Morbid obesity [facility]) 11/13/15 BMI 44.9  MVP (mitral valve prolapse)  Pinched nerve in neck 9/2016  Post-operative nausea and vomiting   
 pt reports severe N/V : request e-mend or scopolamine patch; pt reports hoarseness, cough s/p intubation  PUD (peptic ulcer disease) 1980's  Right shoulder injury Reported injury while shopping 11/23/18  Stroke [facility]) 2007  
 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  
 after knee surgery  Torn rotator cuff   
 bilateral repairs. DO NOT PULL PT WITH HER ARMS OR SHOULDERS  
 Unspecified adverse effect of anesthesia 1983  
 delayed awakening with D&C  Vitamin D deficiency Past Surgical History:  
Procedure Laterality Date  HX APPENDECTOMY  HX BREAST BIOPSY Left 11/20/2015 BREAST NEEDLE LOCALIZED LUMPECTOMY LEFT / PREOP @1000/ BREAST CENTER @1130 FOR LEFT US NEEDLE LOC/ SURGERY @1330 performed by Sarika Del Castillo MD at [address] [place] [address] HX COLONOSCOPY  2015  
 polyps; pt states pre-cancerous [address], 2003 X3  
 HX DILATION AND CURETTAGE  2017  HX GYN  2017  
 cervical polyp removal  
 HX HEENT Bilateral at age 2  
 muscle & balances sx  HX HEENT  11/2017  
 resec of inferior turbinates & balloon sinuplasty [employer] HEENT  [id number] & 2011  
 eye surgery  HX HYSTERECTOMY  2018  
 robotic hysterectomy, BSO  HX ORTHOPAEDIC Right   
 knee sx X2; torn meniscus & acl torn  HX SEPTOPLASTY  [id number]  HX TONSILLECTOMY  SHOULDER SURG PROC UNLISTED Bilateral last ones 2006 & 2007 \"multiple\"; last shoulder sx pt states came in for left shoulder repair; pt states while being moved from stretcher to OR table operating employee pulled on Right arm while pt telling her to stop & pt states right rotator cuff was torn Family History:  
Problem Relation Age of Onset  Cancer Mother   
     lung cancer  Parkinsonism Father  Cancer Brother   
     nose cancer  Cancer Brother   
     lung cancer  Breast Cancer Neg Hx   
 Ovarian Cancer Neg Hx Social History Socioeconomic History  Marital status:  Spouse name: Not on file  Number of children: Not on file  Years of education: Not on file  Highest education level: Not on file Social Needs  Financial resource strain: Not on file  Food insecurity - worry: Not on file  Food insecurity - inability: Not on file  Transportation needs - medical: Not on file  Transportation needs - non-medical: Not on file Occupational History  Not on file Tobacco Use  Smoking status: Never Smoker  Smokeless tobacco: Never Used Substance and Sexual Activity  Alcohol use: No  
 Drug use: No  
 Sexual activity: Not on file Other Topics Concern [address] Service Not Asked  Blood Transfusions Not Asked  Caffeine Concern No  
 Occupational Exposure Not Asked [place] [employer] Hazards Not Asked  Sleep Concern Not Asked  Stress Concern Not Asked  Weight Concern Not Asked  Special Diet Not Asked  Back Care Not Asked  Exercise No  
 Bike Helmet Not Asked [place] Yes  Self-Exams Yes Social History Narrative , but divorce process has been going on for 3 years.  lives in [place]. Worked as , but now disabled due to CVA residual effects. Has 2 sons. ALLERGIES: Clindamycin; Iodinated contrast- oral and iv dye; Tramadol hcl; Ultram [tramadol]; Aloe vera; Codeine; Heparin analogues; Lanolin; Other medication; Vigamox [moxifloxacin]; Aloe; Aspartame; Cefzil [cefprozil]; Fentanyl; Heparin; Morphine; Oxycodone hcl; Potassium clavulanate; Propoxyphene napsylate; Sulfa (sulfonamide antibiotics); and Varicella virus vacc live (pf) Review of Systems Constitutional: Negative for chills and fever. Respiratory: Negative for chest tightness, shortness of breath and stridor. Cardiovascular: Negative for chest pain and palpitations. Gastrointestinal: Negative for abdominal pain, diarrhea, nausea and vomiting. Musculoskeletal: Negative for neck pain and neck stiffness. Skin: Negative. Negative for color change. All other systems reviewed and are negative. Vitals:  
 12/01/18 3596 12/01/18 5698 12/01/18 0501 12/01/18 6957 BP:  173/87 173/87 (!) 182/91 Pulse:  77 72 83 Resp:  15 (!) 40 18 Temp:      
SpO2: 98% 96% 100% 94% Weight:      
Height:      
      
 
Physical Exam  
Constitutional: She appears well-developed and well-nourished. No distress. HENT:  
Head: Normocephalic and atraumatic. Eyes: Conjunctivae are normal. No scleral icterus. Neck: Normal range of motion. Cardiovascular: Normal rate, regular rhythm and normal heart sounds. Pulmonary/Chest: Effort normal. No stridor. No respiratory distress. She has no wheezes. She has no rales. Abdominal: Soft. She exhibits no mass. There is no tenderness. There is no rebound and no guarding. Neurological: She is alert. Slight drift to the right leg into the right face on initial evaluation the right facial droop resolved within 20 minutes of her arrival.  
Psychiatric: Her behavior is normal.  
Patient is very anxious. Nursing note and vitals reviewed. MDM Number of Diagnoses or Management Options Cerebrovascular accident (CVA), unspecified mechanism ([address]):  
Nausea and vomiting, intractability of vomiting not specified, unspecified vomiting type:  
Vertigo:  
Diagnosis management comments: Patient's symptoms are improving may be due to vertigo but there may also be an underlying ischemic stroke. Neurology was consulted they do not feel patient meets criteria for TPA she is out of the window and his minimal symptoms. I agree and patient has been admitted to the hospitalist.   
Shakeel Boo MD; 12/1/2018 @7:52 AM Voice dictation software was used during the making of this note. This software is not perfect and grammatical and other typographical errors may be present. This note has not been proofread for errors. 
=================================================================== Amount and/or Complexity of Data Reviewed Clinical lab tests: ordered and reviewed (Results for orders placed or performed during the hospital encounter of 12/01/18 
-CBC WITH AUTOMATED DIFF Result                      Value             Ref Range WBC                         12.2 (H)          4.3 - 11.1 K* 
     RBC                         4.74              4.05 - 5.2 M* HGB                         14.7              11.7 - 15.4 * HCT                         43.6              35.8 - 46.3 % MCV                         92.0              79.6 - 97.8 * MCH                         31.0              26.1 - 32.9 * MCHC                        33.7              31.4 - 35.0 * RDW                         13.4              11.9 - 14.6 % PLATELET                    255               150 - 450 K/* MPV                         10.0              9.4 - 12.3 FL ABSOLUTE NRBC               0.00              0.0 - 0.2 K/* DF                          AUTOMATED NEUTROPHILS                 44                43 - 78 % LYMPHOCYTES                 47 (H)            13 - 44 % MONOCYTES                   8                 4.0 - 12.0 % EOSINOPHILS                 1                 0.5 - 7.8 % BASOPHILS                   0                 0.0 - 2.0 % IMMATURE GRANULOCYTES       1                 0.0 - 5.0 %   
     ABS. NEUTROPHILS            5.4               1.7 - 8.2 K/* ABS. LYMPHOCYTES            5.7 (H)           0.5 - 4.6 K/* ABS. MONOCYTES              0.9               0.1 - 1.3 K/* ABS. EOSINOPHILS            0.2               0.0 - 0.8 K/* ABS. BASOPHILS              0.0               0.0 - 0.2 K/* ABS. IMM. GRANS.            0.1               0.0 - 0.5 K/* 
-METABOLIC PANEL, COMPREHENSIVE Result                      Value             Ref Range      Sodium                      141               136 - 145 mm* 
 Potassium                   3.9               3.5 - 5.1 mm* Chloride                    108 (H)           98 - 107 mmo* CO2                         24                21 - 32 mmol* Anion gap                   9                 7 - 16 mmol/L Glucose                     118 (H)           65 - 100 mg/* BUN                         22                8 - 23 MG/DL Creatinine                  0.96              0.6 - 1.0 MG* 
     GFR est AA                  >60               >60 ml/min/1* GFR est non-AA              >60               >60 ml/min/1* Calcium                     8.9               8.3 - 10.4 M* Bilirubin, total            0.7               0.2 - 1.1 MG* ALT (SGPT)                  106 (H)           12 - 65 U/L   
     AST (SGOT)                  45 (H)            15 - 37 U/L Alk. phosphatase            126               50 - 136 U/L Protein, total              6.8               6.3 - 8.2 g/* Albumin                     3.5               3.2 - 4.6 g/* Globulin                    3.3               2.3 - 3.5 g/* A-G Ratio                   1.1 (L)           1.2 - 3.5     
-URINE MICROSCOPIC Result                      Value             Ref Range WBC                                     0 /hpf        
     RBC                         5-10              0 /hpf Epithelial cells            3-5               0 /hpf Bacteria                    2+ (H)            0 /hpf Casts                       3-5               0 /lpf -GLUCOSE, POC Result                      Value             Ref Range Glucose (POC)               114 (H)           65 - 100 mg/* 
-POC TROPONIN-I Result                      Value             Ref Range      Troponin-I (POC)            0.08 (H)          0.02 - 0.05 * 
-POC PT/INR 
 Result                      Value             Ref Range Prothrombin time (POC)      18.0 (H)          9.6 - 11.6 S* INR (POC)                   1.5 (H)           0.9 - 1.2     ) Tests in the radiology section of CPT®: ordered and reviewed (Ct Head Wo Cont Result Date: 12/1/2018 EXAM:  Noncontrast CT head. DATE:  December 1, 2018. INDICATION:  Right-sided facial droop and right leg weakness. COMPARISON: Prior CT brain on September 5, 2007. TECHNIQUE: Axial noncontrast CT images of the head were obtained. Radiation dose reduction techniques were used for this study. Our CT scanners use one or all of the following: Automated exposure control, adjustment of the mA and/or kV according to patient size, iterative reconstruction. FINDINGS:  The brain, ventricles and posterior fossa structures are within normal limits. No acute infarct, hemorrhage or mass is identified. There is no mass effect, midline shift or evidence of hydrocephalus. No fracture is seen in the skull or skull base. The mastoids are clear. Mild mucosal thickening is noted in the right maxillary sinus. IMPRESSION:  No acute intracranial process. ) Independent visualization of images, tracings, or specimens: yes (Normal sinus rhythm, narrow QRS complex, no bundle branch blocks, and no ST segment elevation ) Procedures

## 2018-12-01 NOTE — CONSULTS
Consult    Patient: Steven De Leon MRN: [id number]  SSN: [id number]    YOB: 1955  Age: 58 y.o. Sex: female        Assessment:     Acute onset vertigo with apparent transient right-sided weakness. Potential posterior circulation stroke    History of central retinal artery occlusion on chronic Coumadin  Hospital Problems  Date Reviewed: 11/27/2018          Codes Class Noted POA    * (Principal) Acute CVA (cerebrovascular accident) [facility]) ICD-10-CM: I63.9  ICD-9-CM: 434.91  12/1/2018 Yes              Plan:     CTA head and neck  2-D echocardiogram, consider ALICIA  EKG monitoring consider looking recorder  Hemoglobin A1c, lipid panel  Physical therapy occupational therapy speech therapy      Subjective:      Steven De Leon is a 58 y.o. female who is being seen for Evaluation of possible stroke. The patient has a history of central retinal artery occlusion and is on chronic Coumadin therapy. She was in her usual state that around 3 AM last night when she got up to the bathroom and developed severe vertigo and right-sided weakness patient was brought to [facility] emergency Department as a potential stroke. She she essentially shortly thereafter by specialist on call. NIH stroke scale was for questionable right-sided weakness. She did not receive TPA. .  She continues to have vertigo whenever she moves her head but does not have vertigo if she lays still. She denies any facial paresthesias or diplopia.     Past Medical History:   Diagnosis Date    Adverse effect of anesthesia     delayed awakening    Anticoagulated on Coumadin     Anxiety     Arthritis     OA     Bleeding from nipple in female     Breast lump     left    Chronic pain     shoulders and knees    CKD (chronic kidney disease) 11/13/2015    resolved per patient    Current use of long term anticoagulation     pt takes coumadin due to hx of CVA    Difficult intubation     per patient \"I was told my trachea takes a funny turn and need a shorter tube. 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  History of DVT of lower extremity     1973 (left leg)     2007 (right leg, following knee surgery)    HTN (hypertension)     controlled with lisinopril    Hx of gout     managed with medication     Hyperlipidemia     no medication     Insomnia     Left ear hearing loss 11/11/2016    Mitral valve prolapse     last echo ?2004 at [facility] Morbid obesity ([facility] [address] 11/13/15    BMI 44.9    MVP (mitral valve prolapse)     Pinched nerve in neck     9/2016    Post-operative nausea and vomiting     pt reports severe N/V : request e-mend or scopolamine patch; pt reports hoarseness, cough s/p intubation    PUD (peptic ulcer disease) 1980's    Right shoulder injury     Reported injury while shopping 11/23/18    Stroke [facility]) 2007    optic stroke, deaf in left ear, 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  DO NOT PULL PT WITH HER ARMS OR SHOULDERS    Unspecified adverse effect of anesthesia 1983    delayed awakening with D&C    Vitamin D deficiency           Past Surgical History:   Procedure Laterality Date    HX APPENDECTOMY      HX BREAST BIOPSY Left 11/20/2015    BREAST NEEDLE LOCALIZED LUMPECTOMY LEFT / PREOP @1000/ BREAST CENTER @1130 FOR LEFT US NEEDLE LOC/ SURGERY @1330 performed by Columba Henley MD at [address] [facility] HX COLONOSCOPY  2015    polyps; pt states pre-cancerous    HX DILATION AND CURETTAGE  1982, 1999, 2003    X3    HX DILATION AND CURETTAGE  2017    HX GYN  2017    cervical polyp removal    HX HEENT Bilateral at age 1    muscle & balances sx    HX HEENT  11/2017 resec of inferior turbinates & balloon sinuplasty    HX HEENT  1957 & 2011    eye surgery    HX HYSTERECTOMY  2018    robotic hysterectomy, BSO    HX ORTHOPAEDIC Right     knee sx X2; torn meniscus & acl torn     HX SEPTOPLASTY  1972    HX TONSILLECTOMY      SHOULDER SURG PROC UNLISTED Bilateral last ones 2006 & 2007    \"multiple\"; last shoulder sx pt states came in for left shoulder repair; pt states while being moved from stretcher to OR table operating employee pulled on Right arm while pt telling her to stop & pt states right rotator cuff was torn      Family History   Problem Relation Age of Onset    Cancer Mother         lung cancer    Parkinsonism Father     Cancer Brother         nose cancer    Cancer Brother         lung cancer    Breast Cancer Neg Hx     Ovarian Cancer Neg Hx      Social History     Tobacco Use    Smoking status: Never Smoker    Smokeless tobacco: Never Used   Substance Use Topics    Alcohol use: No      Current Facility-Administered Medications   Medication Dose Route Frequency Provider Last Rate Last Dose    allopurinol (ZYLOPRIM) tablet 300 mg  300 mg Oral QHS Tee Castellanos MD        doxycycline (VIBRAMYCIN) capsule 100 mg  100 mg Oral BID Tee Castellanos MD        DULoxetine (CYMBALTA) capsule 60 mg  60 mg Oral DAILY Tee Castellanos MD        lisinopril (PRINIVIL, ZESTRIL) tablet 20 mg  20 mg Oral QPM Tee Castellanos MD        pregabalin (LYRICA) capsule 50 mg  50 mg Oral BID Tee Castellanos MD        pantoprazole (PROTONIX) tablet 40 mg  40 mg Oral BID Tee Castellanos MD        temazepam (RESTORIL) capsule 30 mg  30 mg Oral QHS Tee Castellanos MD        oxyCODONE-acetaminophen (PERCOCET) 5-325 mg per tablet 1 Tab  1 Tab Oral Q4H PRN Tee Castellanos MD        warfarin (COUMADIN) tablet 6 mg  6 mg Oral QPM Tee Castellanos MD        sodium chloride (NS) flush 5-10 mL  5-10 mL IntraVENous Mason Bolton MD 5 mL at 12/01/18 1000    sodium chloride (NS) flush 5-10 mL  5-10 mL IntraVENous PRN Tee Castellanos MD        tuberculin injection 5 Units  5 Units IntraDERMal ONCE Tee Castellanos MD   5 Units at 12/01/18 1043    0.9% sodium chloride infusion  75 mL/hr IntraVENous CONTINUOUS Tee Castellanos MD 75 mL/hr at 12/01/18 0934 75 mL/hr at 12/01/18 0934    acetaminophen (TYLENOL) tablet 650 mg  650 mg Oral Q4H PRN Tee Castellanos MD        ondansetron (ZOFRAN) injection 4 mg  4 mg IntraVENous Q4H PRN Tee Castellanos MD   4 mg at 12/01/18 1040    aspirin delayed-release tablet 325 mg  325 mg Oral DAILY Tee Castellanos MD   325 mg at 12/01/18 1040    atorvastatin (LIPITOR) tablet 40 mg  40 mg Oral DAILY Tee Castellanos MD   40 mg at 12/01/18 1040        Allergies   Allergen Reactions    Clindamycin Anaphylaxis    Iodinated Contrast- Oral And Iv Dye Anaphylaxis    Tramadol Hcl Anaphylaxis    Ultram [Tramadol] Anaphylaxis    Aloe Vera Hives    Codeine Itching and Nausea and Vomiting    Heparin Analogues Hives     Pt states ok to take warfarin.  Lanolin Hives    Other Medication Hives and Rash     !  Allergic to most antibotics 2 dermabond 3 maracaine    Vigamox [Moxifloxacin] Itching    Aloe Hives    Aspartame Nausea and Vomiting    Cefzil [Cefprozil] Rash    Fentanyl Nausea and Vomiting    Heparin Other (comments)    Morphine Nausea and Vomiting    Oxycodone Hcl Nausea and Vomiting    Potassium Clavulanate Other (comments)     Severe leg cramps    Propoxyphene Napsylate Other (comments)    Sulfa (Sulfonamide Antibiotics) Itching    Varicella Virus Vacc Live (Pf) Hives and Swelling     Shingle vaccine       Review of Systems:  12 point review of systems is otherwise negative    Objective:     Vitals:    12/01/18 0721 12/01/18 0901 12/01/18 0922 12/01/18 1200   BP: 165/84 155/90 (!) 124/94 149/80   Pulse: 89 85 82 (!) 101   Resp: 14 16 19 19   Temp:   98 °F (36.7 °C) 98.5 °F (36.9 °C)   SpO2: 99% 95% 97% 96%   Weight:       Height:            Physical Exam:      General: well nourished, appears stated age    Eyes: no proptosis or exophthalmos; conjunctivae clear, sclerae non-icteric    Chest: clear to auscultation    Cardiac: normal S1 S2; no murmurs gallop or rubs    Neurological:    MSE: alert, oriented times 3; fluent speech; no paraphasic errors; follows commands without difficulty    CN 2: visual fields full; no afferent pupillary defect; VA not checked; fundoscopic exam .. Shira Chen Not performed  CN 3,4,6: Pupils symmetrical in size, reactive to light directly and consensually; no ptosis; full versions and ductions; No nystagmus is present  CN 5: facial sensation intact to light touch and pin prick. Corneal reflex . Shira Chen Not performed  CN 7: Symmetrical facial tone and movements  CN 8 responds to spoken voice  CN 9,10; palate symmetrical gag intact  CN 11: head turn and shoulder shrug intact  CN 12: tongue midline without atrophy or fasiculations    Motor:  Power 5/5 UE and LE proximal to distal  Fine motor movements symmetrical  Tone normal  Atrophy: absent  Gait: Not assessed    Cerebellar:  Finger to nose; heel to shin intact  Tandem iNot checked    Sensory  RombergNot performed  Intact to primary modalities in all 4 extremities    Reflexes    Symmetrical and normally active at 2+ in UE and LE  Plantar response flexor bilaterally    Recent Results (from the past 24 hour(s))   CBC WITH AUTOMATED DIFF    Collection Time: 12/01/18  3:57 AM   Result Value Ref Range    WBC 12.2 (H) 4.3 - 11.1 K/uL    RBC 4.74 4.05 - 5.2 M/uL    HGB 14.7 11.7 - 15.4 g/dL    HCT 43.6 35.8 - 46.3 %    MCV 92.0 79.6 - 97.8 FL    MCH 31.0 26.1 - 32.9 PG    MCHC 33.7 31.4 - 35.0 g/dL    RDW 13.4 11.9 - 14.6 %    PLATELET 219 333 - 250 K/uL    MPV 10.0 9.4 - 12.3 FL    ABSOLUTE NRBC 0.00 0.0 - 0.2 K/uL    DF AUTOMATED      NEUTROPHILS 44 43 - 78 %    LYMPHOCYTES 47 (H) 13 - 44 %    MONOCYTES 8 4.0 - 12.0 % EOSINOPHILS 1 0.5 - 7.8 %    BASOPHILS 0 0.0 - 2.0 %    IMMATURE GRANULOCYTES 1 0.0 - 5.0 %    ABS. NEUTROPHILS 5.4 1.7 - 8.2 K/UL    ABS. LYMPHOCYTES 5.7 (H) 0.5 - 4.6 K/UL    ABS. MONOCYTES 0.9 0.1 - 1.3 K/UL    ABS. EOSINOPHILS 0.2 0.0 - 0.8 K/UL    ABS. BASOPHILS 0.0 0.0 - 0.2 K/UL    ABS. IMM. GRANS. 0.1 0.0 - 0.5 K/UL   METABOLIC PANEL, COMPREHENSIVE    Collection Time: 12/01/18  3:57 AM   Result Value Ref Range    Sodium 141 136 - 145 mmol/L    Potassium 3.9 3.5 - 5.1 mmol/L    Chloride 108 (H) 98 - 107 mmol/L    CO2 24 21 - 32 mmol/L    Anion gap 9 7 - 16 mmol/L    Glucose 118 (H) 65 - 100 mg/dL    BUN 22 8 - 23 MG/DL    Creatinine 0.96 0.6 - 1.0 MG/DL    GFR est AA >60 >60 ml/min/1.73m2    GFR est non-AA >60 >60 ml/min/1.73m2    Calcium 8.9 8.3 - 10.4 MG/DL    Bilirubin, total 0.7 0.2 - 1.1 MG/DL    ALT (SGPT) 106 (H) 12 - 65 U/L    AST (SGOT) 45 (H) 15 - 37 U/L    Alk.  phosphatase 126 50 - 136 U/L    Protein, total 6.8 6.3 - 8.2 g/dL    Albumin 3.5 3.2 - 4.6 g/dL    Globulin 3.3 2.3 - 3.5 g/dL    A-G Ratio 1.1 (L) 1.2 - 3.5     GLUCOSE, POC    Collection Time: 12/01/18  4:00 AM   Result Value Ref Range    Glucose (POC) 114 (H) 65 - 100 mg/dL   POC TROPONIN-I    Collection Time: 12/01/18  4:02 AM   Result Value Ref Range    Troponin-I (POC) 0.08 (H) 0.02 - 0.05 ng/ml   EKG, 12 LEAD, INITIAL    Collection Time: 12/01/18  4:14 AM   Result Value Ref Range    Ventricular Rate 66 BPM    Atrial Rate 66 BPM    P-R Interval 136 ms    QRS Duration 70 ms    Q-T Interval 392 ms    QTC Calculation (Bezet) 410 ms    Calculated P Axis 60 degrees    Calculated R Axis 23 degrees    Calculated T Axis 34 degrees    Diagnosis       !!! Poor data quality, interpretation may be adversely affected  Sinus rhythm with sinus arrhythmia  Low voltage QRS  Borderline ECG  When compared with ECG of 24-SEP-2016 10:27,  No significant change was found  Confirmed by ST PARTH MARTINEZ MD (), SWATHI ORTEGA ([id number]) on 12/1/2018 9:38:06 AM     POC PT/INR    Collection Time: 12/01/18  4:21 AM   Result Value Ref Range    Prothrombin time (POC) 18.0 (H) 9.6 - 11.6 SECS    INR (POC) 1.5 (H) 0.9 - 1.2     URINE MICROSCOPIC    Collection Time: 12/01/18  7:38 AM   Result Value Ref Range    WBC  0 /hpf    RBC 5-10 0 /hpf    Epithelial cells 3-5 0 /hpf    Bacteria 2+ (H) 0 /hpf    Casts 3-5 0 /lpf       Lab Results   Component Value Date/Time    Cholesterol, total 187 09/25/2015 09:30 AM    HDL Cholesterol 33 (L) 09/25/2015 09:30 AM    LDL, calculated 101 (H) 09/25/2015 09:30 AM    VLDL, calculated 53 (H) 09/25/2015 09:30 AM    Triglyceride 263 (H) 09/25/2015 09:30 AM        No results found for: HBA1C, HGBE8, IEV6UYDB, OMZ9PDXU     CT Results (most recent):  Results from [place] encounter on 12/01/18   CT HEAD WO CONT    Narrative EXAM:  Noncontrast CT head. DATE:  December 1, 2018. INDICATION:  Right-sided facial droop and right leg weakness. COMPARISON: Prior CT brain on September 5, 2007. TECHNIQUE: Axial noncontrast CT images of the head were obtained. Radiation dose reduction techniques were used for this study. Our CT scanners  use one or all of the following: Automated exposure control, adjustment of the  mA and/or kV according to patient size, iterative reconstruction. FINDINGS:  The brain, ventricles and posterior fossa structures are within  normal limits. No acute infarct, hemorrhage or mass is identified. There is no  mass effect, midline shift or evidence of hydrocephalus. No fracture is seen in  the skull or skull base. The mastoids are clear. Mild mucosal thickening is  noted in the right maxillary sinus. Impression IMPRESSION:  No acute intracranial process.                Results for orders placed or performed during the hospital encounter of 12/01/18   EKG, 12 LEAD, INITIAL   Result Value Ref Range    Ventricular Rate 66 BPM    Atrial Rate 66 BPM    P-R Interval 136 ms    QRS Duration 70 ms    Q-T Interval 392 ms    QTC Calculation (Bezet) 410 ms    Calculated P Axis 60 degrees    Calculated R Axis 23 degrees    Calculated T Axis 34 degrees    Diagnosis       !!! Poor data quality, interpretation may be adversely affected  Sinus rhythm with sinus arrhythmia  Low voltage QRS  Borderline ECG  When compared with ECG of 24-SEP-2016 10:27,  No significant change was found  Confirmed by ST PARTH MARTINEZ MD (), SWATHI ORTEGA ([id number]) on 12/1/2018 9:38:06 AM          MRI Results (most recent):  No results found for this or any previous visit. US Results (most recent):  Results from [place] encounter on 12/18/17   US PELV NON OB W TV    Narrative TRANSABDOMINAL AND TRANSVAGINAL PELVIC SONOGRAPHY    CLINICAL HISTORY:  Pelvic pain with a history of endometrial polyp. COMPARISON:  CT of July 26, 2017 as well as report of all but ultrasound from  Dr. Darlene Wilder office dated July 12, 2017 (although those images are not  available for direct comparison). FINDINGS:  Transabdominal and transvaginal images show that the uterus is  slightly enlarged. It measures 9.4 x 3.8 x 5.9 cm with endometrial stripe of 9  mm, which is thickened after menopause. Multiple leiomyomata are again noted. The largest at the posterior aspect of the body measures 2.7 x 2.0 x 1.6 mm  today, apparently increased from 1.4 x 1.6 x 1.1 cm in July. The right ovary  measures 2.6 x 1.0 x 1.7 cm and is normal in contour and echogenicity. The left  ovary measures 1.7 x 1.2 x 1.3 cm and is normal in contour and echogenicity. No  adnexal mass or abnormal fluid collection is identified. Impression IMPRESSION:      1.  Multiple uterine leiomyomata with interval increase in size of the mass at  the posterior body of the uterus compared with report of in-office ultrasound  dated July 12, 2017.    2.  Persistent post menopausal endometrial thickening.         Most recent CTA  Results from [place] encounter on 12/01/18   CT HEAD WO CONT Narrative EXAM:  Noncontrast CT head. DATE:  December 1, 2018. INDICATION:  Right-sided facial droop and right leg weakness. COMPARISON: Prior CT brain on September 5, 2007. TECHNIQUE: Axial noncontrast CT images of the head were obtained. Radiation dose reduction techniques were used for this study. Our CT scanners  use one or all of the following: Automated exposure control, adjustment of the  mA and/or kV according to patient size, iterative reconstruction. FINDINGS:  The brain, ventricles and posterior fossa structures are within  normal limits. No acute infarct, hemorrhage or mass is identified. There is no  mass effect, midline shift or evidence of hydrocephalus. No fracture is seen in  the skull or skull base. The mastoids are clear. Mild mucosal thickening is  noted in the right maxillary sinus. Impression IMPRESSION:  No acute intracranial process. Most recent MRI  No results found for this or any previous visit.         Signed By: Jaja Granda MD     December 1, 2018

## 2018-12-01 NOTE — PROGRESS NOTES
Speech therapy note Attempted to see pt this am, however, pt refused, reports nausea.   
 
 Chato Sifuentes MA/ANGELA/SLP

## 2018-12-01 NOTE — PROGRESS NOTES
12/01/18 0920 Dual Skin Pressure Injury Assessment Dual Skin Pressure Injury Assessment WDL Second Care Provider (Based on [address] [place] [address]) [place] Skin Integumentary Skin Integumentary (WDL) WDL Wound Prevention and Protection Methods Orientation of Wound Prevention Posterior Location of Wound Prevention Sacrum/Coccyx Dressing Present  No  
Wound Offloading (Prevention Methods) Bed, pressure reduction mattress;Pillows;Repositioning

## 2018-12-01 NOTE — H&P
Hospitalist H&P Note Admit Date:  2018  4:08 AM  
Name:  America Thomas Age:  58 y.o. 
:  1955 MRN:  [id number] PCP:  Nga Giron MD 
Treatment Team: Attending Provider: Bobo Guerra MD; Charge Nurse: Sebastien Valencia. HPI:  
Ms. Joan Saba is a 57 yo female with a past medical history of Essential hypertension, CVA (optic eye stroke , pt describes it as a stroke to her left eye vessel but caused left eye impaired vision and left ear hearing loss), gout (on allopurinol and Colchicine prn), mitral valve prolapse, history of DVT after knee surgery and is on chronic AC with Warfarin, recent rhinoplasty reversal surgery (18). Pt presented to the ER with symptoms of dizziness, right sided weakness and facial droop. Pt reports that ~ 10:30 pm while watching TV she started feeling spinning sensation and nauseous. She reports right upper and lower extremity weakness and right facial numbness and weakness. No fever, chills, CP, or urinary symptoms. Pt is at home on ASA 81 mg daily. In the ER she was afebrile, hemodynamically stable and saturating well on RA. Pt was evaluated by tele-neuro and did not qualify for tpa as she was outside the time window. Recommended for admission with MRI brain. 10 systems reviewed and negative except as noted in HPI. Past Medical History:  
Diagnosis Date  Adverse effect of anesthesia   
 delayed awakening  Anticoagulated on Coumadin  Anxiety  Arthritis OA  Bleeding from nipple in female  Breast lump   
 left  Chronic pain   
 shoulders and knees  CKD (chronic kidney disease) 2015  
 resolved per patient  Current use of long term anticoagulation   
 pt takes coumadin due to hx of CVA  Difficult intubation   
 per patient \"I was told my trachea takes a funny turn and need a shorter tube. \"   
 GERD (gastroesophageal reflux disease)   
 well controlled with med-  
 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  History of DVT of lower extremity 1973 (left leg)     2007 (right leg, following knee surgery)  HTN (hypertension)   
 controlled with lisinopril  Hx of gout   
 managed with medication  Hyperlipidemia   
 no medication  Insomnia  Left ear hearing loss 11/11/2016  Mitral valve prolapse   
 [address] ?[address] Morbid obesity [facility]) 11/13/15 BMI 44.9  MVP (mitral valve prolapse)  Pinched nerve in neck 9/2016  Post-operative nausea and vomiting   
 pt reports severe N/V : request e-mend or scopolamine patch; pt reports hoarseness, cough s/p intubation  PUD (peptic ulcer disease) 1980's  Right shoulder injury Reported injury while shopping 11/23/18  Stroke [facility]) 2007  
 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  
 after knee surgery  Torn rotator cuff   
 bilateral repairs. DO NOT PULL PT WITH HER ARMS OR SHOULDERS  
 Unspecified adverse effect of anesthesia 1983  
 delayed awakening with D&C  Vitamin D deficiency Past Surgical History:  
Procedure Laterality Date  HX APPENDECTOMY  HX BREAST BIOPSY Left 11/20/2015 BREAST NEEDLE LOCALIZED LUMPECTOMY LEFT / PREOP @1000/ BREAST CENTER @1130 FOR LEFT US NEEDLE LOC/ SURGERY @1330 performed by Columba Henley MD at [address] [place] [address] HX COLONOSCOPY  2015  
 polyps; pt states pre-cancerous [address], 2003 X3  
 HX DILATION AND CURETTAGE  2017  HX GYN  2017  
 cervical polyp removal  
 HX HEENT Bilateral at age 2  
 muscle & balances sx  HX HEENT  11/2017  
 resec of inferior turbinates & balloon sinuplasty [place] [facility] HEENT  [id number] & 2011  
 eye surgery  HX HYSTERECTOMY  2018  
 robotic hysterectomy, BSO  HX ORTHOPAEDIC Right   
 knee sx X2; torn meniscus & acl torn  HX SEPTOPLASTY  [id number]  HX TONSILLECTOMY  SHOULDER SURG PROC UNLISTED Bilateral last ones 2006 & 2007 \"multiple\"; last shoulder sx pt states came in for left shoulder repair; pt states while being moved from stretcher to OR table operating employee pulled on Right arm while pt telling her to stop & pt states right rotator cuff was torn Allergies Allergen Reactions  Clindamycin Anaphylaxis  Iodinated Contrast- Oral And Iv Dye Anaphylaxis  Tramadol Hcl Anaphylaxis  Ultram [Tramadol] Anaphylaxis  Aloe Vera Hives  Codeine Itching and Nausea and Vomiting  Heparin Analogues Hives Pt states ok to take warfarin.  Lanolin Hives  Other Medication Hives and Rash ! Allergic to most antibotics 2 dermabond 3 maracaine  Vigamox [Moxifloxacin] Itching  Aloe Hives  Aspartame Nausea and Vomiting  Cefzil [Cefprozil] Rash  Fentanyl Nausea and Vomiting  Heparin Other (comments)  Morphine Nausea and Vomiting  Oxycodone Hcl Nausea and Vomiting  Potassium Clavulanate Other (comments) Severe leg cramps  Propoxyphene Napsylate Other (comments)  Sulfa (Sulfonamide Antibiotics) Itching  Varicella Virus Vacc Live (Pf) Hives and Swelling Shingle vaccine Social History Tobacco Use  Smoking status: Never Smoker  Smokeless tobacco: Never Used Substance Use Topics  Alcohol use: No  
  
Family History Problem Relation Age of Onset  Cancer Mother   
     lung cancer  Parkinsonism Father  Cancer Brother   
     nose cancer  Cancer Brother   
     lung cancer  Breast Cancer Neg Hx   
 Ovarian Cancer Neg Hx Immunization History Administered Date(s) Administered  Influenza Vaccine 10/12/2014  Influenza Vaccine (Quad) PF 09/17/2018  Pneumococcal Vaccine (Unspecified Type) 01/01/2005, 01/01/2010  TB Skin Test (PPD) 01/01/2011  Zoster Vaccine, Live 11/03/2017 PTA Medications: 
Prior to Admission Medications Prescriptions Last Dose Informant Patient Reported? Taking? DULoxetine (CYMBALTA) 60 mg capsule   No Yes Sig: Take 1 Cap by mouth daily. Indications: Chronic Musculoskeletal Pain, major depressive disorder Patient taking differently: Take 60 mg by mouth every morning. LYRICA 50 mg capsule   Yes Yes Sig: Take 1 Tab by mouth two (2) times a day. OMEGA-3 FATTY ACIDS/FISH OIL (OMEGA 3 FISH OIL PO)   Yes No  
Sig: Take 2,100 mg by mouth daily. 2100 mg cap--1 cap po qd   
allopurinol (ZYLOPRIM) 300 mg tablet   No Yes Sig: Take 1 Tab by mouth nightly. colchicine (COLCRYS) 0.6 mg tablet   No No  
Sig: Acute gout: 1.2 mg (2 tabs) PO at signs of attack then 0.6 mg (1 tab) 1 h later. No more than 3 tablets in the first 24 h. Continue taking 1 tablet daily. Patient taking differently: Take 0.6 mg by mouth as needed. Acute gout: 1.2 mg (2 tabs) PO at signs of attack then 0.6 mg (1 tab) 1 h later. No more than 3 tablets in the first 24 h. Continue taking 1 tablet daily. Indications: acute gouty arthritis  
cyanocobalamin 1,000 mcg tablet   Yes Yes Sig: Take 1,500 mcg by mouth daily. Indications: PREVENTION OF VITAMIN B12 DEFICIENCY, hold until after surgery  
doxycycline (VIBRAMYCIN) 100 mg capsule   No Yes Sig: Take 1 Cap by mouth two (2) times a day. lisinopril (PRINIVIL, ZESTRIL) 20 mg tablet   No Yes Sig: Take 1 Tab by mouth daily. Indications: hypertension Patient taking differently: Take 20 mg by mouth every evening. Indications: hypertension  
omeprazole (PRILOSEC) 40 mg capsule   No Yes Sig: Take 1 Cap by mouth two (2) times a day.  Indications: gastroesophageal reflux disease  
ondansetron (ZOFRAN ODT) 4 mg disintegrating tablet   No No  
 Si tab Q 8 Hours PRN N/V  Indications: PREVENTION OF POST-OPERATIVE NAUSEA AND VOMITING  
oxyCODONE-acetaminophen (PERCOCET) 5-325 mg per tablet   No No  
Sig: Take 1 Tab by mouth every four (4) hours as needed for Pain. Max Daily Amount: 6 Tabs. temazepam (RESTORIL) 30 mg capsule   No Yes Sig: Take 1 Cap by mouth nightly. Max Daily Amount: 30 mg.  
tiZANidine (ZANAFLEX) 4 mg tablet   No No  
Sig: Take 1 Tab by mouth three (3) times daily as needed. Indications: Muscle Spasm Patient taking differently: Take 4 mg by mouth three (3) times daily as needed. Takes for sleep  Indications: Muscle Spasm  
warfarin (COUMADIN) 6 mg tablet   No Yes Sig: Take 1 Tab by mouth daily. Patient taking differently: Take 6 mg by mouth daily. Holding for surgery- last dose 18- Lovenox [facility] Facility-Administered Medications: None Objective:  
 
Patient Vitals for the past 24 hrs: 
 Temp Pulse Resp BP SpO2  
18  83 18 (!) 182/91 94 % 18 0501  72 (!) 40 173/87 100 % 18 0441  77 15 173/87 96 % 18 0421     98 % 18 0421  74 (!) 50 181/88 96 % 18 0417  75 10 195/88 95 % 18 0410 97.6 °F (36.4 °C) 73 18 192/86 99 % 18 0408  69  192/86 100 % Oxygen Therapy O2 Sat (%): 94 % (18) Pulse via Oximetry: 85 beats per minute (18) O2 Device: Room air (18) No intake or output data in the 24 hours ending 18 Physical Exam: 
General:    Well nourished. Alert. Eyes:   Normal sclera. Extraocular movements intact. ENT:  Normocephalic, atraumatic. Moist mucous membranes. Nasal dressing from recent surgery C/D/I. CV:   RRR. No murmur, rub, or gallop. Lungs:  CTAB. No wheezing, rhonchi, or rales. Abdomen: Soft, nontender, nondistended. Bowel sounds normal.  
Extremities: Warm and dry. No cyanosis or edema. Neurologic: Decreased sensation to the right face. Right facial droop. Motor strength, RUE 3/5, LUE 5/5, RLE 1/5, LLE 5/5. Skin:     No rashes or jaundice. Psych:  Normal mood and affect. I reviewed the labs, imaging, EKGs, telemetry, and other studies done this admission. Data Review:  
Recent Results (from the past 24 hour(s)) CBC WITH AUTOMATED DIFF Collection Time: 12/01/18  3:57 AM  
Result Value Ref Range WBC 12.2 (H) 4.3 - 11.1 K/uL  
 RBC 4.74 4.05 - 5.2 M/uL  
 HGB 14.7 11.7 - 15.4 g/dL HCT 43.6 35.8 - 46.3 % MCV 92.0 79.6 - 97.8 FL  
 MCH 31.0 26.1 - 32.9 PG  
 MCHC 33.7 31.4 - 35.0 g/dL  
 RDW 13.4 11.9 - 14.6 % PLATELET 580 529 - 397 K/uL MPV 10.0 9.4 - 12.3 FL ABSOLUTE NRBC 0.00 0.0 - 0.2 K/uL  
 DF AUTOMATED NEUTROPHILS 44 43 - 78 % LYMPHOCYTES 47 (H) 13 - 44 % MONOCYTES 8 4.0 - 12.0 % EOSINOPHILS 1 0.5 - 7.8 % BASOPHILS 0 0.0 - 2.0 % IMMATURE GRANULOCYTES 1 0.0 - 5.0 %  
 ABS. NEUTROPHILS 5.4 1.7 - 8.2 K/UL  
 ABS. LYMPHOCYTES 5.7 (H) 0.5 - 4.6 K/UL  
 ABS. MONOCYTES 0.9 0.1 - 1.3 K/UL  
 ABS. EOSINOPHILS 0.2 0.0 - 0.8 K/UL  
 ABS. BASOPHILS 0.0 0.0 - 0.2 K/UL  
 ABS. IMM. GRANS. 0.1 0.0 - 0.5 K/UL METABOLIC PANEL, COMPREHENSIVE Collection Time: 12/01/18  3:57 AM  
Result Value Ref Range Sodium 141 136 - 145 mmol/L Potassium 3.9 3.5 - 5.1 mmol/L Chloride 108 (H) 98 - 107 mmol/L  
 CO2 24 21 - 32 mmol/L Anion gap 9 7 - 16 mmol/L Glucose 118 (H) 65 - 100 mg/dL BUN 22 8 - 23 MG/DL Creatinine 0.96 0.6 - 1.0 MG/DL  
 GFR est AA >60 >60 ml/min/1.73m2 GFR est non-AA >60 >60 ml/min/1.73m2 Calcium 8.9 8.3 - 10.4 MG/DL Bilirubin, total 0.7 0.2 - 1.1 MG/DL  
 ALT (SGPT) 106 (H) 12 - 65 U/L  
 AST (SGOT) 45 (H) 15 - 37 U/L Alk. phosphatase 126 50 - 136 U/L Protein, total 6.8 6.3 - 8.2 g/dL Albumin 3.5 3.2 - 4.6 g/dL Globulin 3.3 2.3 - 3.5 g/dL A-G Ratio 1.1 (L) 1.2 - 3.5 GLUCOSE, POC Collection Time: 12/01/18  4:00 AM  
Result Value Ref Range Glucose (POC) 114 (H) 65 - 100 mg/dL POC TROPONIN-I Collection Time: 12/01/18  4:02 AM  
Result Value Ref Range Troponin-I (POC) 0.08 (H) 0.02 - 0.05 ng/ml POC PT/INR Collection Time: 12/01/18  4:21 AM  
Result Value Ref Range Prothrombin time (POC) 18.0 (H) 9.6 - 11.6 SECS  
 INR (POC) 1.5 (H) 0.9 - 1.2 All Micro Results None Other Studies: 
Ct Head Wo Cont Result Date: 12/1/2018 EXAM:  Noncontrast CT head. DATE:  December 1, 2018. INDICATION:  Right-sided facial droop and right leg weakness. COMPARISON: Prior CT brain on September 5, 2007. TECHNIQUE: Axial noncontrast CT images of the head were obtained. Radiation dose reduction techniques were used for this study. Our CT scanners use one or all of the following: Automated exposure control, adjustment of the mA and/or kV according to patient size, iterative reconstruction. FINDINGS:  The brain, ventricles and posterior fossa structures are within normal limits. No acute infarct, hemorrhage or mass is identified. There is no mass effect, midline shift or evidence of hydrocephalus. No fracture is seen in the skull or skull base. The mastoids are clear. Mild mucosal thickening is noted in the right maxillary sinus. IMPRESSION:  No acute intracranial process. Assessment and Plan:  
 
Hospital Problems as of 12/1/2018 Date Reviewed: 11/27/2018 Codes Class Noted - Resolved POA [facility] ICD-10-CM: I63.9 ICD-9-CM: 434.91  12/1/2018 - Present Unknown Acute CVA: With right facial droop, right upper and lower extremities weakness. Dizziness. CT head negative. Will check MRI. At home on ASA 81. Will place on 325 mg and Lipitor 40. PT/OT/ST. Lipid panel and a1c. Will check Echo and carotids.  Pt is allergic to contrast. Will consult neurology service for assist.  
 
Previous history of CVA: Pt reports optic eye stroke 2007, pt describes it as a stroke to her left eye vessel but caused left eye impaired vision and left ear hearing loss. Essential hypertension, hypertensive urgency: Continue home medications. Will allow for permissive hypertension. Gout: On Allopurinol and Colchicine prn. Will continue Allopurinol. History of DVT after knee surgery and is on chronic AC with Warfarin: INR 1.5. Will continue with Warfarin for now. May need to hold if significant size stroke. CT head neg for bleeding. Neurology service consulted for assist.  
 
Recent rhinoplasty reversal surgery (11/27/18): Warfarin resumed and Lovenox bridge was already discontinued prior to presentation. Continue on Doxycycline. Hx of mitral valve prolapse. DVT ppx: Warfarin Code status:  Full Estimated LOS:  Greater than 2 midnights Risk:  high Signed:  
Farrukh Alegre MD

## 2018-12-01 NOTE — PROGRESS NOTES
Neurologist called to verify which protocol needed for the patient with the Iodinated contrast allergy. CT stated they needed an order for which method/option to use for CT. Left a message with the on-call neurologist will continue to monitor.

## 2018-12-01 NOTE — ED TRIAGE NOTES
Pt arrives via EMS, for stroke sx, KATERINAN 4-5 hours ago. Pt c/o dizziness, nausea, slurred speech, right sided facial droop, decreased right side  strength. Pt has hx of ocular stroke in 2007 with left side vision loss, and hearing loss.

## 2018-12-01 NOTE — PROGRESS NOTES
12/01/18 0920 Dual Skin Pressure Injury Assessment Dual Skin Pressure Injury Assessment WDL Second Care Provider (Based on [address] [facility]) Parker Rosario, [address] [facility] Skin Integumentary Skin Integumentary (WDL) X Skin Color Ecchymosis (comment) (around nose (recent rhinoplasty)) Skin Condition/Temp Warm;Dry Skin Integrity Incision (comment) (nose) Turgor Non-tenting Wound Prevention and Protection Methods Orientation of Wound Prevention Posterior Location of Wound Prevention Sacrum/Coccyx Dressing Present  No  
Wound Offloading (Prevention Methods) Bed, pressure reduction mattress;Pillows;Repositioning

## 2018-12-01 NOTE — PROGRESS NOTES
TRANSFER - IN REPORT: 
 
Verbal report received from Skylar RN(name) on Maritza Medina  being received from ED dept(unit) for routine progression of care Report consisted of patients Situation, Background, Assessment and  
Recommendations(SBAR). Information from the following report(s) SBAR, Kardex, [facility] and Recent Results was reviewed with the receiving nurse. Opportunity for questions and clarification was provided. Assessment completed upon patients arrival to unit and care assumed.

## 2018-12-01 NOTE — PROGRESS NOTES
Problem: Falls - Risk of 
Goal: *Absence of Falls Document Corine Kappa Fall Risk and appropriate interventions in the flowsheet. Outcome: Progressing Towards Goal 
Fall Risk Interventions: 
Mobility Interventions: Bed/chair exit alarm, Communicate number of staff needed for ambulation/transfer, OT consult for ADLs, Patient to call before getting OOB, PT Consult for mobility concerns, PT Consult for assist device competence, Strengthening exercises (ROM-active/passive) Medication Interventions: Evaluate medications/consider consulting pharmacy, Patient to call before getting OOB, Teach patient to arise slowly Elimination Interventions: Bed/chair exit alarm, Call light in reach, Patient to call for help with toileting needs History of Falls Interventions: Bed/chair exit alarm, Consult care management for discharge planning, Door open when patient unattended, Evaluate medications/consider consulting pharmacy Problem: Pressure Injury - Risk of 
Goal: *Prevention of pressure injury Document Handy Scale and appropriate interventions in the flowsheet. Outcome: Progressing Towards Goal 
Pressure Injury Interventions: Activity Interventions: Increase time out of bed, Pressure redistribution bed/mattress(bed type), PT/OT evaluation Mobility Interventions: HOB 30 degrees or less, Pressure redistribution bed/mattress (bed type), PT/OT evaluation Nutrition Interventions: Document food/fluid/supplement intake, Offer support with meals,snacks and hydration

## 2018-12-01 NOTE — PROGRESS NOTES
Patient supine with intractable nausea, vertigo and dizziness. She is scheduled for an MRI. PT to be held until 12/2/2018 or 12/3/2018 depending upon her medical status. Thanks! Christiano Miller, PT, DPT, OCS.

## 2018-12-01 NOTE — PROGRESS NOTES
Spoke with the hospitalist and reported that the patient refused the protocol for the CT with contrast dye, she stated \"i don't want to risk feeling how I felt the last time I had that, I don't care what they do to prevent it. \" Hospitalist gave orders to d/c the order.

## 2018-12-02 ENCOUNTER — APPOINTMENT (OUTPATIENT)
Dept: MRI IMAGING | Age: 63
End: 2018-12-02
Attending: PSYCHIATRY & NEUROLOGY
Payer: MEDICARE

## 2018-12-02 LAB
ANION GAP SERPL CALC-SCNC: 11 MMOL/L (ref 7–16)
BUN SERPL-MCNC: 18 MG/DL (ref 8–23)
CALCIUM SERPL-MCNC: 8.5 MG/DL (ref 8.3–10.4)
CHLORIDE SERPL-SCNC: 109 MMOL/L (ref 98–107)
CHOLEST SERPL-MCNC: 166 MG/DL
CO2 SERPL-SCNC: 24 MMOL/L (ref 21–32)
CREAT SERPL-MCNC: 0.97 MG/DL (ref 0.6–1)
ERYTHROCYTE [DISTWIDTH] IN BLOOD BY AUTOMATED COUNT: 13.7 % (ref 11.9–14.6)
EST. AVERAGE GLUCOSE BLD GHB EST-MCNC: 97 MG/DL
GLUCOSE SERPL-MCNC: 97 MG/DL (ref 65–100)
HBA1C MFR BLD: 5 % (ref 4.8–6)
HCT VFR BLD AUTO: 42.9 % (ref 35.8–46.3)
HDLC SERPL-MCNC: 35 MG/DL (ref 40–60)
HDLC SERPL: 4.7 {RATIO}
HGB BLD-MCNC: 14.2 G/DL (ref 11.7–15.4)
LDLC SERPL CALC-MCNC: 90.4 MG/DL
LIPID PROFILE,FLP: ABNORMAL
MCH RBC QN AUTO: 31.3 PG (ref 26.1–32.9)
MCHC RBC AUTO-ENTMCNC: 33.1 G/DL (ref 31.4–35)
MCV RBC AUTO: 94.7 FL (ref 79.6–97.8)
MM INDURATION POC: 0 MM (ref 0–5)
NRBC # BLD: 0 K/UL (ref 0–0.2)
PLATELET # BLD AUTO: 219 K/UL (ref 150–450)
PMV BLD AUTO: 9.9 FL (ref 9.4–12.3)
POTASSIUM SERPL-SCNC: 3.8 MMOL/L (ref 3.5–5.1)
PPD POC: NORMAL NEGATIVE
RBC # BLD AUTO: 4.53 M/UL (ref 4.05–5.2)
SODIUM SERPL-SCNC: 144 MMOL/L (ref 136–145)
TRIGL SERPL-MCNC: 203 MG/DL (ref 35–150)
VLDLC SERPL CALC-MCNC: 40.6 MG/DL (ref 6–23)
WBC # BLD AUTO: 10 K/UL (ref 4.3–11.1)

## 2018-12-02 PROCEDURE — 70544 MR ANGIOGRAPHY HEAD W/O DYE: CPT

## 2018-12-02 PROCEDURE — 92610 EVALUATE SWALLOWING FUNCTION: CPT

## 2018-12-02 PROCEDURE — G8979 MOBILITY GOAL STATUS: HCPCS

## 2018-12-02 PROCEDURE — 97530 THERAPEUTIC ACTIVITIES: CPT

## 2018-12-02 PROCEDURE — 80061 LIPID PANEL: CPT

## 2018-12-02 PROCEDURE — G8978 MOBILITY CURRENT STATUS: HCPCS

## 2018-12-02 PROCEDURE — 97161 PT EVAL LOW COMPLEX 20 MIN: CPT

## 2018-12-02 PROCEDURE — A9575 INJ GADOTERATE MEGLUMI 0.1ML: HCPCS | Performed by: INTERNAL MEDICINE

## 2018-12-02 PROCEDURE — 99233 SBSQ HOSP IP/OBS HIGH 50: CPT | Performed by: PSYCHIATRY & NEUROLOGY

## 2018-12-02 PROCEDURE — 97165 OT EVAL LOW COMPLEX 30 MIN: CPT

## 2018-12-02 PROCEDURE — 96361 HYDRATE IV INFUSION ADD-ON: CPT

## 2018-12-02 PROCEDURE — 80048 BASIC METABOLIC PNL TOTAL CA: CPT

## 2018-12-02 PROCEDURE — 74011000258 HC RX REV CODE- 258: Performed by: INTERNAL MEDICINE

## 2018-12-02 PROCEDURE — 74011250637 HC RX REV CODE- 250/637: Performed by: INTERNAL MEDICINE

## 2018-12-02 PROCEDURE — 65660000000 HC RM CCU STEPDOWN

## 2018-12-02 PROCEDURE — 65390000012 HC CONDITION CODE 44 OBSERVATION

## 2018-12-02 PROCEDURE — 74011250637 HC RX REV CODE- 250/637: Performed by: PSYCHIATRY & NEUROLOGY

## 2018-12-02 PROCEDURE — 36415 COLL VENOUS BLD VENIPUNCTURE: CPT

## 2018-12-02 PROCEDURE — 74011250636 HC RX REV CODE- 250/636: Performed by: INTERNAL MEDICINE

## 2018-12-02 PROCEDURE — 83036 HEMOGLOBIN GLYCOSYLATED A1C: CPT

## 2018-12-02 PROCEDURE — 77030020263 HC SOL INJ SOD CL0.9% LFCR 1000ML

## 2018-12-02 PROCEDURE — 77030039270 HC TU BLD FLTR CARD -A

## 2018-12-02 PROCEDURE — 70549 MR ANGIOGRAPH NECK W/O&W/DYE: CPT

## 2018-12-02 PROCEDURE — 85027 COMPLETE CBC AUTOMATED: CPT

## 2018-12-02 RX ORDER — SODIUM CHLORIDE 0.9 % (FLUSH) 0.9 %
10 SYRINGE (ML) INJECTION
Status: COMPLETED | OUTPATIENT
Start: 2018-12-02 | End: 2018-12-02

## 2018-12-02 RX ORDER — DIAZEPAM 5 MG/1
5 TABLET ORAL
Status: DISCONTINUED | OUTPATIENT
Start: 2018-12-02 | End: 2018-12-05 | Stop reason: HOSPADM

## 2018-12-02 RX ORDER — GADOTERATE MEGLUMINE 376.9 MG/ML
20 INJECTION INTRAVENOUS
Status: COMPLETED | OUTPATIENT
Start: 2018-12-02 | End: 2018-12-02

## 2018-12-02 RX ADMIN — LISINOPRIL 20 MG: 20 TABLET ORAL at 17:40

## 2018-12-02 RX ADMIN — DIAZEPAM 5 MG: 5 TABLET ORAL at 11:34

## 2018-12-02 RX ADMIN — SODIUM CHLORIDE 100 ML: 900 INJECTION, SOLUTION INTRAVENOUS at 16:51

## 2018-12-02 RX ADMIN — GADOTERATE MEGLUMINE 20 ML: 376.9 INJECTION INTRAVENOUS at 16:51

## 2018-12-02 RX ADMIN — ASPIRIN 325 MG: 325 TABLET, DELAYED RELEASE ORAL at 09:10

## 2018-12-02 RX ADMIN — Medication 10 ML: at 16:51

## 2018-12-02 RX ADMIN — WARFARIN SODIUM 6 MG: 5 TABLET ORAL at 17:40

## 2018-12-02 RX ADMIN — ATORVASTATIN CALCIUM 40 MG: 40 TABLET, FILM COATED ORAL at 09:11

## 2018-12-02 RX ADMIN — PREGABALIN 50 MG: 50 CAPSULE ORAL at 09:08

## 2018-12-02 RX ADMIN — SODIUM CHLORIDE 75 ML/HR: 900 INJECTION, SOLUTION INTRAVENOUS at 04:30

## 2018-12-02 RX ADMIN — Medication 10 ML: at 20:53

## 2018-12-02 RX ADMIN — PREGABALIN 50 MG: 50 CAPSULE ORAL at 17:40

## 2018-12-02 RX ADMIN — DIAZEPAM 5 MG: 5 TABLET ORAL at 17:41

## 2018-12-02 RX ADMIN — ACETAMINOPHEN 650 MG: 325 TABLET, FILM COATED ORAL at 04:30

## 2018-12-02 RX ADMIN — DULOXETINE 60 MG: 60 CAPSULE, DELAYED RELEASE ORAL at 09:10

## 2018-12-02 RX ADMIN — DOXYCYCLINE HYCLATE 100 MG: 100 CAPSULE ORAL at 17:40

## 2018-12-02 RX ADMIN — ALLOPURINOL 300 MG: 300 TABLET ORAL at 20:52

## 2018-12-02 RX ADMIN — Medication 5 ML: at 04:33

## 2018-12-02 RX ADMIN — PANTOPRAZOLE SODIUM 40 MG: 40 TABLET, DELAYED RELEASE ORAL at 09:11

## 2018-12-02 RX ADMIN — PANTOPRAZOLE SODIUM 40 MG: 40 TABLET, DELAYED RELEASE ORAL at 20:52

## 2018-12-02 RX ADMIN — DOXYCYCLINE HYCLATE 100 MG: 100 CAPSULE ORAL at 09:11

## 2018-12-02 NOTE — PROGRESS NOTES
Problem: Mobility Impaired (Adult and Pediatric) Goal: *Acute Goals and Plan of Care (Insert Text) LTG: 
(1.)Ms. Ernesto Chadwick will move from supine to sit and sit to supine , scoot up and down and roll side to side in bed with MODIFIED INDEPENDENCE within 7 treatment day(s). (2.)Ms. Ernesto Chadwick will demonstrate good static and dynamic sitting balance within 7 days. (3.)Ms. Ernesto Chadwick will perform B LE therapeutic exercises x 20 reps with INDEPENDENCE within 7 days to increase strength for improved safety and independence in transfers and gait. Will add additional goals as pt is able to tolerate. 
________________________________________________________________________________________________ PHYSICAL THERAPY: Initial Assessment 12/2/2018INPATIENT: Hospital Day: 2 Payor: Ángel Steele / Plan: [address] [place] [address] HMO / Product Type: [employer] Care Medicare /  
  
NAME/AGE/GENDER: Otto Howell is a 58 y.o. female PRIMARY DIAGNOSIS: Acute CVA (cerebrovascular accident) ([facility] [address] [I63.9] Acute CVA (cerebrovascular accident) ([facility] [address] Acute CVA (cerebrovascular accident) ([facility] [address] ICD-10: Treatment Diagnosis:  
 · Generalized Muscle Weakness (M62.81) · Other lack of cordination (R27.8) · Difficulty in walking, Not elsewhere classified (R26.2) · History of falling (Z91.81) Precaution/Allergies: 
Clindamycin; Iodinated contrast- oral and iv dye; Tramadol hcl; Ultram [tramadol]; Aloe vera; Codeine; Heparin analogues; Lanolin; Other medication; Vigamox [moxifloxacin]; Aloe; Aspartame; Cefzil [cefprozil]; Fentanyl; Heparin; Morphine; Oxycodone hcl; Potassium clavulanate; Propoxyphene napsylate; Sulfa (sulfonamide antibiotics); and Varicella virus vacc live (pf)  
  
ASSESSMENT:  
Ms. Ernesto Chadwick presents supine in bed. Pt admitted for possible CVA and vertigo.  Attempted x 3 to assist pt in becoming more upright while sitting in bed (Attempted supine to sit and also attempted slowly elevating the head of the bed in small increments with time to adjust). Pt unable to achieve full upright sitting secondary to nausea and vertigo. Attempted rolling L/R with no improvement of symptoms. Able to grossly assess strength in supine position and found slight decrease in strength and coordination R katie body compared to L (decreased  strength and decreased DF/PF on R). Pt also reports decreased sensation R hand and foot. At conclusion of evaluation, transport staff arrived to take pt for testing. Assisted pt with slide transfer to stretcher as she was unable to perform SPT. Will con't to follow. May need to be seen by vestibular rehab therapist if vertigo does not resolve. Expect progress will be slow until resolution of vertigo. Currently, pt would be unable to return home alone and would benefit from continuation of PT services. This section established at most recent assessment PROBLEM LIST (Impairments causing functional limitations): 1. Decreased Strength 2. Decreased ADL/Functional Activities 3. Decreased Transfer Abilities 4. Decreased Ambulation Ability/Technique 5. Decreased Balance 6. Decreased Activity Tolerance 7. Decreased [place] with Home Exercise Program 
 INTERVENTIONS PLANNED: (Benefits and precautions of physical therapy have been discussed with the patient.) 1. Balance Exercise 2. Bed Mobility 3. Gait Training 4. Home Exercise Program (HEP) 5. Therapeutic Activites 6. Therapeutic Exercise/Strengthening 7. Transfer Training TREATMENT PLAN: Frequency/Duration: 3 times a week for duration of hospital stay Rehabilitation Potential For Stated Goals: Good RECOMMENDED REHABILITATION/EQUIPMENT: (at time of discharge pending progress): Due to the probability of continued deficits (see above) this patient will likely need continued skilled physical therapy after discharge. Equipment:  
? None at this time HISTORY:  
 History of Present Injury/Illness (Reason for Referral): 
Per chart: Ms. Mendez Antoine is a 59 yo female with a past medical history of Essential hypertension, CVA (optic eye stroke 2007, pt describes it as a stroke to her left eye vessel but caused left eye impaired vision and left ear hearing loss), gout (on allopurinol and Colchicine prn), mitral valve prolapse, history of DVT after knee surgery and is on chronic AC with Warfarin, recent rhinoplasty reversal surgery (11/27/18).   
Pt presented to the ER with symptoms of dizziness, right sided weakness and facial droop. Pt reports that ~ 10:30 pm while watching TV she started feeling spinning sensation and nauseous. She reports right upper and lower extremity weakness and right facial numbness and weakness. No fever, chills, CP, or urinary symptoms. Pt is at home on ASA 81 mg daily.  
  
In the ER she was afebrile, hemodynamically stable and saturating well on RA. Pt was evaluated by tele-neuro and did not qualify for tpa as she was outside the time window. Recommended for admission with MRI brain.  
  
Past Medical History/Comorbidities: Ms. Mendez Antoine  has a past medical history of Adverse effect of anesthesia, Anticoagulated on Coumadin, Anxiety, Arthritis, Bleeding from nipple in female, Breast lump, Chronic pain, CKD (chronic kidney disease), Current use of long term anticoagulation, Difficult intubation, GERD (gastroesophageal reflux disease), H/O right knee surgery, History of CVA (cerebrovascular accident), History of DVT of lower extremity, HTN (hypertension), gout, Hyperlipidemia, Insomnia, Left ear hearing loss, Mitral valve prolapse, Morbid obesity ([address] MVP (mitral valve prolapse), Pinched nerve in neck, Post-operative nausea and vomiting, PUD (peptic ulcer disease), Right shoulder injury, Stroke ([address] Thromboembolus ([address] Torn rotator cuff, Unspecified adverse effect of anesthesia, and Vitamin D deficiency. Ms. Alicia Yun  has a past surgical history that includes hx colonoscopy (2015); hx septoplasty (1972); hx heent (Bilateral, at age 1); hx tonsillectomy; hx heent (11/2017); hx heent (1957 & 2011); hx appendectomy; hx dilation and curettage (1982, 1999, 2003); hx dilation and curettage (2017); hx gyn (2017); hx hysterectomy (2018); hx orthopaedic (Right); pr shoulder surg proc unlisted (Bilateral, last ones 2006 & 2007); OPEN TIP RHINOPLASTY (N/A, 11/27/2018); TURBINATE REDUCTION (N/A, 8/27/2018); REPAIR OF VESTIBULAR STENOSSIS (N/A, 8/27/2018); TURBINATE REDUCTION (N/A, 6/4/2018); BILATERAL INTERNAL NASAL VALVE REPAIR WITH  ALLOGRAFT (Bilateral, 6/4/2018); HYSTERECTOMY ROBOTIC ASSISTED BILATERAL SALPINGO OOPHORECTOMY (Bilateral, 2/27/2018); BILATERAL MAXILLARY BALLOON SINUPLASTY (Bilateral, 11/20/2017); SUBMUCOUS RESECTION INFERIOR TURBINATES (N/A, 11/20/2017); DILATATION AND CURETTAGE HYSTEROSCOPY WITH MYOSURE, CERVICAL POLYPECTOMY, ENDOMETRIAL POLYPECTOMY (N/A, 8/2/2017); BRONCHOSCOPY (N/A, 12/31/2015); BREAST NEEDLE LOCALIZED LUMPECTOMY LEFT / PREOP @1000/ BREAST CENTER @1130 FOR LEFT US NEEDLE LOC/ SURGERY @1330 (Left, 11/20/2015); COLONOSCOPY/ BMI 43.89 (N/A, 7/23/2014); and ENDOSCOPIC POLYPECTOMY (N/A, 7/23/2014). Social History/Living Environment:  
Home Environment: Private residence # Steps to Enter: 4 Rails to Enter: No 
Wheelchair Ramp: No 
One/Two Story Residence: Two story Living Alone: No 
Support Systems: Child(zamzam) Patient Expects to be Discharged to[de-identified] Private residence Current DME Used/Available at Home: Scharlene Minors, rollator Tub or Shower Type: Shower Prior Level of Function/Work/Activity: 
Pt lives with son. Pt is alone 2 days a week while son is at school. Pt lives in a 2 level home where bedroom is on second level. 3 steps to enter home from outside. There is a half bath on main level. Pt does drive. 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  Was ambulating independently with a rollator walker PRN. Personal Factors:   
      Sex:  female Age:  58 y.o. Number of Personal Factors/Comorbidities that affect the Plan of Care: 3+: HIGH COMPLEXITY EXAMINATION:  
Most Recent Physical Functioning:  
Gross Assessment: 
AROM: Generally decreased, functional 
Strength: Generally decreased, functional 
Coordination: Generally decreased, functional 
Tone: Abnormal(right foot) Sensation: Impaired Posture: 
Posture (WDL): (unable to fully assess secondary to vertigo) Balance: 
Sitting: Impaired Bed Mobility: 
Rolling: Minimum assistance Wheelchair Mobility: 
  
Transfers: 
  
Gait: 
  
   
  
Body Structures Involved: 1. Nerves 2. Muscles Body Functions Affected: 1. Neuromusculoskeletal 
2. Movement Related Activities and Participation Affected: 1. General Tasks and Demands 2. Mobility 3. Self Care Number of elements that affect the Plan of Care: 4+: HIGH COMPLEXITY CLINICAL PRESENTATION:  
Presentation: Evolving clinical presentation with changing clinical characteristics: MODERATE COMPLEXITY CLINICAL DECISION MAKING:  
M [employer] -PAC 6 Clicks Basic Mobility Inpatient Short Form How much difficulty does the patient currently have. .. Unable A Lot A Little None 1. Turning over in bed (including adjusting bedclothes, sheets and blankets)? [] 1   [] 2   [x] 3   [] 4  
2. Sitting down on and standing up from a chair with arms ( e.g., wheelchair, bedside commode, etc.)   [] 1   [x] 2   [] 3   [] 4  
3. Moving from lying on back to sitting on the side of the bed? [] 1   [x] 2   [] 3   [] 4 How much help from another person does the patient currently need. .. Total A Lot A Little None 4. Moving to and from a bed to a chair (including a wheelchair)? [] 1   [x] 2   [] 3   [] 4  
5. Need to walk in hospital room? [] 1   [x] 2   [] 3   [] 4  
6. Climbing 3-5 steps with a railing?    [x] 1   [] 2   [] 3   [] 4  
 © 2007, Trustees of [facility] [employer], under license to [employer]. All rights reserved Score:  Initial: 12 Most Recent: X (Date: -- ) Interpretation of Tool:  Represents activities that are increasingly more difficult (i.e. Bed mobility, Transfers, Gait). Score 24 23 22-20 19-15 14-10 9-7 6 Modifier CH CI CJ CK CL CM CN   
 
? Mobility - Walking and Moving Around:  
  - CURRENT STATUS: CL - 60%-79% impaired, limited or restricted  - GOAL STATUS: CL - 60%-79% impaired, limited or restricted  - D/C STATUS:  ---------------To be determined--------------- Payor: Kinsey Izaguirre / Plan: [address] [place] [address] HMO / Product Type: [employer] Medicare /   
 
Medical Necessity:    
· Patient demonstrates good rehab potential due to higher previous functional level. Reason for Services/Other Comments: 
· Patient continues to require present interventions due to patient's inability to function at baseline. Use of outcome tool(s) and clinical judgement create a POC that gives a: Clear prediction of patient's progress: LOW COMPLEXITY  
  
 
 
 
TREATMENT:  
(In addition to Assessment/Re-Assessment sessions the following treatments were rendered) Pre-treatment Symptoms/Complaints:  C/o dizziness and nausea with any movement Pain: Initial:  
Pain Intensity 1: 0  Post Session:  0/10 Therapeutic Activity: (    8 minutes): Therapeutic activities including Bed transfers to improve mobility, strength and coordination. Required moderate verbal and tactile cues   to functional independence. Braces/Orthotics/Lines/Etc:  
· O2 Device: Room air Treatment/Session Assessment:   
· Response to Treatment:  Nausea, vertigo with any movement · Interdisciplinary Collaboration:  
o Physical Therapist 
o Registered Nurse · After treatment position/precautions:  
o on stretcher with transport staff to leave floor for testing · Compliance with Program/Exercises: Will assess as treatment progresses · Recommendations/Intent for next treatment session: \"Next visit will focus on advancements to more challenging activities and reduction in assistance provided\". Total Treatment Duration: PT Patient Time In/Time Out Time In: 1741 Time Out: 1205 Marie Grimes PT

## 2018-12-02 NOTE — PROGRESS NOTES
Ischemic Stroke without Activase/TIA 
 
VTE Prophylaxis: Yes: Coumadin Antiplatelet: Yes: [place] Statin if LDL Greater Than or Equal to100: Yes: lipitor BP Parameters: Less Than 220/120 for 24 hours, then consult MD for parameters Controlled With: Scheduled PO Dysphagia Screen Completed: Yes: Pass Dysphagia Screening Vocal Quality/Secretions: Normal 
History of Dysphagia: No 
O2 Saturation: Normal 
Alertness: Normal 
Pre-Swallow Assessment Score: 0 Purees: No difficulty noted Water by Cup: No difficulty noted Water by Straw: No difficulty noted Patient has PEG, NG Tube, Feeding Tube: No 
 
Medication orders per above route: Yes 
 
Nutrition Status: PO 
 
NIH Stroke Scale Complete: Yes: 5 Frequency of Vital Signs: Every 4 hours Frequency of Neuro Checks: Every 4 hours Daily Education/Care Plan Updated: Yes Liseth Sheehan

## 2018-12-02 NOTE — PROGRESS NOTES
Speech language pathology: bedside swallow note: Initial Assessment and Discharge NAME/AGE/GENDER: Liborio Eubanks is a 58 y.o. female DATE: 12/2/2018 PRIMARY DIAGNOSIS: Acute CVA (cerebrovascular accident) ([facility] [address] [I63.9] ICD-10: Treatment Diagnosis: R13.11 oral phase dysphagia INTERDISCIPLINARY COLLABORATION: Registered Nurse PRECAUTIONS/ALLERGIES: Clindamycin; Iodinated contrast- oral and iv dye; Tramadol hcl; Ultram [tramadol]; Aloe vera; Codeine; Heparin analogues; Lanolin; Other medication; Vigamox [moxifloxacin]; Aloe; Aspartame; Cefzil [cefprozil]; Fentanyl; Heparin; Morphine; Oxycodone hcl; Potassium clavulanate; Propoxyphene napsylate; Sulfa (sulfonamide antibiotics); and Varicella virus vacc live (pf) ASSESSMENT:Based on the objective data described below, Ms. Mendez Antoine presents with nausea anytime she sits up. She needs to slowly sit herself up and has to wait until she feels better. Pt with no overt signs/sx of aspiration with thin via straw and solid. Recommend continued diet. No further ST indicated at this time. · PO:  Regular · Liquids:  regular thin MEDICATIONS: 
· With liquid ASPIRATION PRECAUTIONS: 
· Slow rate of intake · Small bites/sips · Upright at 90 degrees during meal 
 
SUBJECTIVE:  
alert History of Present Injury/Illness: Ms. Mendez Antoine  has a past medical history of Adverse effect of anesthesia, Anticoagulated on Coumadin, Anxiety, Arthritis, Bleeding from nipple in female, Breast lump, Chronic pain, CKD (chronic kidney disease), Current use of long term anticoagulation, Difficult intubation, GERD (gastroesophageal reflux disease), H/O right knee surgery, History of CVA (cerebrovascular accident), History of DVT of lower extremity, HTN (hypertension), gout, Hyperlipidemia, Insomnia, Left ear hearing loss, Mitral valve prolapse, Morbid obesity ([facility] [address] MVP (mitral valve prolapse), Pinched nerve in neck, Post-operative nausea and vomiting, PUD (peptic ulcer disease), Right shoulder injury, Stroke ([facility] [address] Thromboembolus ([facility] [address] Torn rotator cuff, Unspecified adverse effect of anesthesia, and Vitamin D deficiency. She also  has a past surgical history that includes hx colonoscopy (2015); hx septoplasty (1972); hx heent (Bilateral, at age 1); hx tonsillectomy; hx heent (11/2017); hx heent (1957 & 2011); hx appendectomy; hx dilation and curettage (1982, 1999, 2003); hx dilation and curettage (2017); hx gyn (2017); hx hysterectomy (2018); hx orthopaedic (Right); pr shoulder surg proc unlisted (Bilateral, last ones 2006 & 2007); OPEN TIP RHINOPLASTY (N/A, 11/27/2018); TURBINATE REDUCTION (N/A, 8/27/2018); REPAIR OF VESTIBULAR STENOSSIS (N/A, 8/27/2018); TURBINATE REDUCTION (N/A, 6/4/2018); BILATERAL INTERNAL NASAL VALVE REPAIR WITH  ALLOGRAFT (Bilateral, 6/4/2018); HYSTERECTOMY ROBOTIC ASSISTED BILATERAL SALPINGO OOPHORECTOMY (Bilateral, 2/27/2018); BILATERAL MAXILLARY BALLOON SINUPLASTY (Bilateral, 11/20/2017); SUBMUCOUS RESECTION INFERIOR TURBINATES (N/A, 11/20/2017); DILATATION AND CURETTAGE HYSTEROSCOPY WITH MYOSURE, CERVICAL POLYPECTOMY, ENDOMETRIAL POLYPECTOMY (N/A, 8/2/2017); BRONCHOSCOPY (N/A, 12/31/2015); BREAST NEEDLE LOCALIZED LUMPECTOMY LEFT / PREOP @1000/ BREAST CENTER @1130 FOR LEFT US NEEDLE LOC/ SURGERY @1330 (Left, 11/20/2015); COLONOSCOPY/ BMI 43.89 (N/A, 7/23/2014); and ENDOSCOPIC POLYPECTOMY (N/A, 7/23/2014). Present Symptoms:  
Pain Scale 1: Numeric (0 - 10) Pain Intensity 1: 0 Current Medications: No current facility-administered medications on file prior to encounter. Current Outpatient Medications on File Prior to Encounter Medication Sig Dispense Refill  aspirin delayed-release 81 mg tablet Take 81 mg by mouth daily.  doxycycline (VIBRAMYCIN) 100 mg capsule Take 1 Cap by mouth two (2) times a day. 20 Cap 0  
 LYRICA 50 mg capsule Take 1 Tab by mouth two (2) times a day.   0  
  allopurinol (ZYLOPRIM) 300 mg tablet Take 1 Tab by mouth nightly. 90 Tab 3  
 temazepam (RESTORIL) 30 mg capsule Take 1 Cap by mouth nightly. Max Daily Amount: 30 mg. 30 Cap 5  
 DULoxetine (CYMBALTA) 60 mg capsule Take 1 Cap by mouth daily. Indications: Chronic Musculoskeletal Pain, major depressive disorder (Patient taking differently: Take 60 mg by mouth every morning.) 90 Cap 1  
 warfarin (COUMADIN) 6 mg tablet Take 1 Tab by mouth daily. (Patient taking differently: Take 6 mg by mouth daily. Holding for surgery- last dose 11/21/18- Lovenox [place]) 90 Tab 3  
 omeprazole (PRILOSEC) 40 mg capsule Take 1 Cap by mouth two (2) times a day. Indications: gastroesophageal reflux disease 180 Cap 3  
 lisinopril (PRINIVIL, ZESTRIL) 20 mg tablet Take 1 Tab by mouth daily. Indications: hypertension (Patient taking differently: Take 20 mg by mouth every evening. Indications: hypertension) 90 Tab 3  cyanocobalamin 1,000 mcg tablet Take 1,500 mcg by mouth daily. Indications: PREVENTION OF VITAMIN B12 DEFICIENCY, hold until after surgery  oxyCODONE-acetaminophen (PERCOCET) 5-325 mg per tablet Take 1 Tab by mouth every four (4) hours as needed for Pain. Max Daily Amount: 6 Tabs. 20 Tab 0  
 ondansetron (ZOFRAN ODT) 4 mg disintegrating tablet 1 tab Q 8 Hours PRN N/V  Indications: PREVENTION OF POST-OPERATIVE NAUSEA AND VOMITING 8 Tab 0  
 tiZANidine (ZANAFLEX) 4 mg tablet Take 1 Tab by mouth three (3) times daily as needed. Indications: Muscle Spasm (Patient taking differently: Take 4 mg by mouth three (3) times daily as needed. Takes for sleep  Indications: Muscle Spasm) 90 Tab 1  
 colchicine (COLCRYS) 0.6 mg tablet Acute gout: 1.2 mg (2 tabs) PO at signs of attack then 0.6 mg (1 tab) 1 h later. No more than 3 tablets in the first 24 h. Continue taking 1 tablet daily. (Patient taking differently: Take 0.6 mg by mouth as needed.  Acute gout: 1.2 mg (2 tabs) PO at signs of attack then 0.6 mg (1 tab) 1 h later. No more than 3 tablets in the first 24 h. Continue taking 1 tablet daily. Indications: acute gouty arthritis) 30 Tab 1  
 OMEGA-3 FATTY ACIDS/FISH OIL (OMEGA 3 FISH OIL PO) Take 2,100 mg by mouth daily. 2100 mg cap--1 cap po qd Current Dietary Status:   
Regular/thin OBJECTIVE:  
 
Oral Motor Structure/Speech:  Oral-Motor Structure/Motor Speech Labial: No impairment Oral Hygiene: adequate Lingual: No impairment Cognitive and Communication Status: 
Neurologic State: Alert Orientation Level: Oriented X4 Cognition: Follows commands Perception: Appears intact Perseveration: No perseveration noted Safety/Judgement: Fall prevention BEDSIDE SWALLOW EVALUATIONOral Assessment: 
Oral Assessment Labial: No impairment Oral Hygiene: adequate Lingual: No impairment P.O. Trials: 
Patient Position: upright in bed The patient was given teaspoon amounts of the following:  
Consistency Presented: Solid; Thin liquid;Mixed consistency;Puree How Presented: Self-fed/presented;Straw ORAL PHASE: 
Bolus Acceptance: No impairment Bolus Formation/Control: No impairment Propulsion: No impairment Oral Residue: None PHARYNGEAL PHASE: 
Initiation of Swallow: No impairment Aspiration Signs/Symptoms: None Vocal Quality: No impairment Pharyngeal Phase Characteristics: No impairment, issues, or problems OTHER OBSERVATIONS: 
Rate/bite size: WNL Endurance: WNL Comments: allow pt to slowly sit upright in bed Tool Used: Dysphagia Outcome and Severity Scale (SHAHEED) Score Comments Normal Diet  [x] 7 With no strategies or extra time needed Functional Swallow  [] 6 May have mild oral or pharyngeal delay Mild Dysphagia 
  [] 5 Which may require one diet consistency restricted (those who demonstrate penetration which is entirely cleared on MBS would be included) Mild-Moderate Dysphagia  [] 4 With 1-2 diet consistencies restricted Moderate Dysphagia  [] 3 With 2 or more diet consistencies restricted Moderately Severe Dysphagia  [] 2 With partial PO strategies (trials with ST only) Severe Dysphagia  [] 1 With inability to tolerate any PO safely Score:  Initial: 7 Most Recent: X (Date: -- ) Interpretation of Tool: The Dysphagia Outcome and Severity Scale (SHAHEED) is a simple, easy-to-use, 7-point scale developed to systematically rate the functional severity of dysphagia based on objective assessment and make recommendations for diet level, independence level, and type of nutrition. Score 7 6 5 4 3 2 1 Modifier CH CI CJ CK CL CM CN ? Swallowing:  
  - CURRENT STATUS: CH - 0% impaired, limited or restricted  - GOAL STATUS:  CH - 0% impaired, limited or restricted  - D/C STATUS:  CH - 0% impaired, limited or restricted Payor: Steff De / Plan: [address] [place] [address] HMO / Product Type: [employer] Medicare /  
 
TREATMENT:  
 (In addition to Assessment/Re-Assessment sessions the following treatments were rendered) Assessment/Reassessment only, no treatment provided today MODALITIES:  
   
 
ORAL MOTOR  EXERCISES: 
   
 
LARYNGEAL / PHARYNGEAL EXERCISES: 
   
 
__________________________________________________________________________________________________ Safety: After treatment position/precautions: 
· Supine in bed · Upright in Bed No further ST indicated. Time In: 1000 Time Out: 1010  Mely Puri MA/ANGELA/SLP

## 2018-12-02 NOTE — PROGRESS NOTES
Problem: Self Care Deficits Care Plan (Adult) Goal: *Acute Goals and Plan of Care (Insert Text) 1. Patient will complete lower body bathing and dressing with SBA and adaptive equipment as needed. 2. Patient will complete toileting with CGA. 3. Patient will tolerate 25 minutes of OT treatment with 3-4 rest breaks to increase activity tolerance for ADLs. 4. Patient will complete functional transfers with supervision and adaptive equipment as needed. 5. Patient will complete R UE coordination exercises for 15 minutes to improve coordination/speed of dominant UE for daily tasks. 6. Patient will complete functional mobility for household distances with CGA to improve safety and decreased risk for falls. Timeframe: 7 visits OCCUPATIONAL THERAPY: Initial Assessment and AM 12/2/2018INPATIENT: Hospital Day: 2 Payor: David Factor / Plan: [address] [place] [address] HMO / Product Type: Managed Care Medicare /  
  
NAME/AGE/GENDER: Liborio Eubanks is a 58 y.o. female PRIMARY DIAGNOSIS:  Acute CVA (cerebrovascular accident) ([facility] [address] [I63.9] Acute CVA (cerebrovascular accident) ([facility] [address] Acute CVA (cerebrovascular accident) ([facility] [address] ICD-10: Treatment Diagnosis:  
 · Pain in Right Shoulder (M25.511) · Stiffness of Right Shoulder, Not elsewhere classified (M25.611) · Generalized Muscle Weakness (M62.81) · Other lack of cordination (R27.8) Precautions/Allergies: 
   Clindamycin; Iodinated contrast- oral and iv dye; Tramadol hcl; Ultram [tramadol]; Aloe vera; Codeine; Heparin analogues; Lanolin; Other medication; Vigamox [moxifloxacin]; Aloe; Aspartame; Cefzil [cefprozil]; Fentanyl; Heparin; Morphine; Oxycodone hcl; Potassium clavulanate; Propoxyphene napsylate; Sulfa (sulfonamide antibiotics); and Varicella virus vacc live (pf)  
  
ASSESSMENT:  
Ms. Mendez Antoine presents to the hospital with dizziness and R sided weakness. 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  Pt was supine in bed upon arrival with lights out in the room. Pt states she is less dizzy if she is in this position and the lights bother her. Pt reports that typically she lives with her son and that he goes to college 2 days/wk (she drives him to school). Pt reports that normally she is independent with ADL/functional mobility. Today, pt reports that her weakness on the R side has somewhat improved but her dizziness has not improved at all. Pt reports pain in R shoulder from tripping and catching herself with R arm around Thanksgiving holiday. Pt thinks she may have a RC tear. Pt's L UE is [facility] but her L shoulder is limited by pain to 90 degrees. Pt's R  and elbow flexion/extension is slightly decreased compared to the L. Pt reports decreased sensation along the R shoulder and also demonstrates slowed gross and fine motor coordination on the R. Pt agreeable to attempt to move. Even with just elevation of HOB pt reports significant dizziness. Pt needed moderate assistance to transition to sitting and was able to sit EOB less than 1 minute before returning back to supine suddenly. Pt unable to safely attempt any standing transfers due to dizziness but reports she has been trying to get up to [facility] with assistance. Pt is currently functioning well below baseline with ADL/functional transfers. Pt will benefit from OT services to address stated goals and plan of care. This section established at most recent assessment PROBLEM LIST (Impairments causing functional limitations): 1. Decreased Strength 2. Decreased ADL/Functional Activities 3. Decreased Transfer Abilities 4. Decreased Ambulation Ability/Technique 5. Decreased Balance 6. Decreased Activity Tolerance 7. Edema/Girth 8. Decreased Dickinson with Home Exercise Program 
 INTERVENTIONS PLANNED: (Benefits and precautions of occupational therapy have been discussed with the patient.) 1. Activities of daily living training 2. Adaptive equipment training 3. Balance training 4. Clothing management 5. Donning&doffing training 6. Neuromuscular re-eduation 7. Therapeutic activity 8. Therapeutic exercise TREATMENT PLAN: Frequency/Duration: Follow patient 3 times per week to address above goals. Rehabilitation Potential For Stated Goals: Excellent RECOMMENDED REHABILITATION/EQUIPMENT: (at time of discharge pending progress): Due to the probability of continued deficits (see above) this patient will likely need continued skilled occupational therapy after discharge. Equipment: ? TBD  
    
 
 
 
OCCUPATIONAL PROFILE AND HISTORY:  
History of Present Injury/Illness (Reason for Referral): 
See H&P Past Medical History/Comorbidities: Ms. Linda Brito  has a past medical history of Adverse effect of anesthesia, Anticoagulated on Coumadin, Anxiety, Arthritis, Bleeding from nipple in female, Breast lump, Chronic pain, CKD (chronic kidney disease), Current use of long term anticoagulation, Difficult intubation, GERD (gastroesophageal reflux disease), H/O right knee surgery, History of CVA (cerebrovascular accident), History of DVT of lower extremity, HTN (hypertension), gout, Hyperlipidemia, Insomnia, Left ear hearing loss, Mitral valve prolapse, Morbid obesity ([address] MVP (mitral valve prolapse), Pinched nerve in neck, Post-operative nausea and vomiting, PUD (peptic ulcer disease), Right shoulder injury, Stroke ([address] Thromboembolus ([address] Torn rotator cuff, Unspecified adverse effect of anesthesia, and Vitamin D deficiency.   Ms. Linda Brito  has a past surgical history that includes hx colonoscopy (2015); hx septoplasty (1972); hx heent (Bilateral, at age 1); hx tonsillectomy; hx heent (11/2017); hx heent (1957 & 2011); hx appendectomy; hx dilation and curettage (1982, 1999, 2003); hx dilation and curettage (2017); hx gyn (2017); hx hysterectomy (2018); hx orthopaedic (Right); pr shoulder surg proc unlisted (Bilateral, last ones 2006 & 2007); OPEN TIP RHINOPLASTY (N/A, 11/27/2018); TURBINATE REDUCTION (N/A, 8/27/2018); REPAIR OF VESTIBULAR STENOSSIS (N/A, 8/27/2018); TURBINATE REDUCTION (N/A, 6/4/2018); BILATERAL INTERNAL NASAL VALVE REPAIR WITH  ALLOGRAFT (Bilateral, 6/4/2018); HYSTERECTOMY ROBOTIC ASSISTED BILATERAL SALPINGO OOPHORECTOMY (Bilateral, 2/27/2018); BILATERAL MAXILLARY BALLOON SINUPLASTY (Bilateral, 11/20/2017); SUBMUCOUS RESECTION INFERIOR TURBINATES (N/A, 11/20/2017); DILATATION AND CURETTAGE HYSTEROSCOPY WITH MYOSURE, CERVICAL POLYPECTOMY, ENDOMETRIAL POLYPECTOMY (N/A, 8/2/2017); BRONCHOSCOPY (N/A, 12/31/2015); BREAST NEEDLE LOCALIZED LUMPECTOMY LEFT / PREOP @1000/ BREAST CENTER @1130 FOR LEFT US NEEDLE LOC/ SURGERY @1330 (Left, 11/20/2015); COLONOSCOPY/ BMI 43.89 (N/A, 7/23/2014); and ENDOSCOPIC POLYPECTOMY (N/A, 7/23/2014). Social History/Living Environment:  
Home Environment: Private residence # Steps to Enter: 4 One/Two Story Residence: Two story Living Alone: No 
Support Systems: Child(zamzam) Current DME Used/Available at Home: [address], rollator Tub or Shower Type: Shower Prior Level of Function/Work/Activity: 
Pt reports that typically she lives with her son and that he goes to college 2 days/wk (she drives him to school). Pt reports that normally she is independent with ADL/functional mobility. Pt denies any recent falls. Personal Factors:   
      Overall Behavior:  Anxious and tearful Other factors that influence how disability is experienced by the patient:  Multiple co-morbidities Number of Personal Factors/Comorbidities that affect the Plan of Care: Expanded review of therapy/medical records (1-2):  MODERATE COMPLEXITY ASSESSMENT OF OCCUPATIONAL PERFORMANCE[de-identified]  
Activities of Daily Living:  
Basic ADLs (From Assessment) Complex ADLs (From Assessment) Feeding: Supervision Oral Facial Hygiene/Grooming: Stand-by assistance Bathing: Moderate assistance Upper Body Dressing: Minimum assistance Lower Body Dressing: Moderate assistance Toileting: Moderate assistance Instrumental ADL Meal Preparation: Total assistance Homemaking: Total assistance Grooming/Bathing/Dressing Activities of Daily Living Cognitive Retraining Safety/Judgement: Fall prevention Bed/Mat Mobility Rolling: Minimum assistance Supine to Sit: Moderate assistance Sit to Supine: Minimum assistance Sit to Stand: (unable to tolerate) Most Recent Physical Functioning:  
Gross Assessment: 
AROM: Generally decreased, functional(Limited due to potential RC in R shldr (~90 degrees)) Strength: Generally decreased, functional(4 to 4+/5 in R UE; 5/5 in L UE) Coordination: Generally decreased, functional(slowed in R UE) Tone: Normal 
Sensation: Impaired(reports decreased in R shoulder only) Posture: 
  
Balance: 
Sitting: Impaired Sitting - Static: Fair (occasional) Sitting - Dynamic: Poor (constant support) Bed Mobility: 
Rolling: Minimum assistance Supine to Sit: Moderate assistance Sit to Supine: Minimum assistance Wheelchair Mobility: 
  
Transfers: 
Sit to Stand: (unable to tolerate) Patient Vitals for the past 6 hrs: 
 BP BP Patient Position SpO2 Pulse 12/02/18 0807 112/74 At rest 96 % 72 Mental Status Neurologic State: Alert Orientation Level: Oriented X4 Cognition: Follows commands Perception: Appears intact Perseveration: No perseveration noted Safety/Judgement: Fall prevention Physical Skills Involved: 1. Range of Motion 2. Balance 3. Strength 4. Activity Tolerance 5. Sensation 6. Fine Motor Control 7. Gross Motor Control 8. Vision Cognitive Skills Affected (resulting in the inability to perform in a timely and safe manner): 1. None Psychosocial Skills Affected: 1. Habits/Routines 2. Environmental Adaptation 3. Self-Awareness Number of elements that affect the Plan of Care: 5+:  HIGH COMPLEXITY CLINICAL DECISION MAKIN[address] [place] [id number] AM-PAC 6 Clicks Daily Activity Inpatient Short Form How much help from another person does the patient currently need. .. Total A Lot A Little None 1. Putting on and taking off regular lower body clothing? [] 1   [x] 2   [] 3   [] 4  
2. Bathing (including washing, rinsing, drying)? [] 1   [x] 2   [] 3   [] 4  
3. Toileting, which includes using toilet, bedpan or urinal?   [] 1   [x] 2   [] 3   [] 4  
4. Putting on and taking off regular upper body clothing? [] 1   [] 2   [x] 3   [] 4  
5. Taking care of personal grooming such as brushing teeth? [] 1   [] 2   [x] 3   [] 4  
6. Eating meals? [] 1   [] 2   [x] 3   [] 4  
© , Trustees of [address] [place] [id number], under license to [email or web address]. All rights reserved Score:  Initial: 15 Most Recent: X (Date: -- ) Interpretation of Tool:  Represents activities that are increasingly more difficult (i.e. Bed mobility, Transfers, Gait). Score 24 23 22-20 19-15 14-10 9-7 6 Modifier CH CI CJ CK CL CM CN   
 
? Self Care:  
  - CURRENT STATUS: CK - 40%-59% impaired, limited or restricted  - GOAL STATUS: CJ - 20%-39% impaired, limited or restricted  - D/C STATUS:  ---------------To be determined--------------- Payor: Nehal Alegre / Plan: [address] [place] [address] HMO / Product Type: Managed Care Medicare /   
 
Medical Necessity:    
· Patient demonstrates good rehab potential due to higher previous functional level. Reason for Services/Other Comments: 
· Patient continues to require skilled intervention due to decreased independence with ADL/functional transfers. Use of outcome tool(s) and clinical judgement create a POC that gives a: LOW COMPLEXITY  
 
 
 
TREATMENT:  
(In addition to Assessment/Re-Assessment sessions the following treatments were rendered) Pre-treatment Symptoms/Complaints: Pain: Initial:  
Pain Intensity 1: 0(very dizzy with movement)  Post Session:  same Assessment/Reassessment only, no treatment provided today Braces/Orthotics/Lines/Etc:  
· IV 
· O2 Device: Room air Treatment/Session Assessment:   
· Response to Treatment:  Eval only. · Interdisciplinary Collaboration:  
o Occupational Therapist 
o Registered Nurse · After treatment position/precautions:  
o Supine in bed 
o Bed/Chair-wheels locked 
o Call light within reach 
o RN notified · Compliance with Program/Exercises: Will assess as treatment progresses. · Recommendations/Intent for next treatment session: \"Next visit will focus on advancements to more challenging activities and reduction in assistance provided\". Total Treatment Duration: OT Patient Time In/Time Out Time In: 0935 Time Out: 1820 Anamaria Carrillo OT

## 2018-12-02 NOTE — PROGRESS NOTES
MRA of neck not complete at this time due to current location of patient's IV in L IJ. Peripheral IV attempt x2  by Charge RN Mynor Reid and x2 by ICU outreach RN. Charge RN currently notifying Vascular Access team of need for peripheral IV.

## 2018-12-02 NOTE — PROGRESS NOTES
Per Dr. Zaria Castelan, radiologist stated the Ultrasound gives the necessary diagnosis. If further imaging is needed then need a central line since no iv access.  Cannot use neck iv for the carotid MRA. jh 12-2-18 2:20pm

## 2018-12-02 NOTE — PROGRESS NOTES
Hospitalist [facility] Note Admit Date:  2018  4:08 AM  
Name:  Celso Chatterjee Age:  58 y.o. 
:  1955 MRN:  [id number] PCP:  Kathy Bee MD 
Treatment Team: Attending Provider: Yani Hubbard MD; Consulting Provider: Arnold Dean MD; Charge Nurse: Benedict Nelson 
 
HPI:  
Ms. Priti Portillo is a 59 yo female with a past medical history of Essential hypertension, CVA (optic eye stroke , pt describes it as a stroke to her left eye vessel but caused left eye impaired vision and left ear hearing loss), gout (on allopurinol and Colchicine prn), mitral valve prolapse, history of DVT after knee surgery and is on chronic AC with Warfarin, recent rhinoplasty reversal surgery (18). Pt presented to the ER with symptoms of dizziness, right sided weakness and facial droop. Pt reports that ~ 10:30 pm while watching TV she started feeling spinning sensation and nauseous. She reports right upper and lower extremity weakness and right facial numbness and weakness. No fever, chills, CP, or urinary symptoms. Pt is at home on ASA 81 mg daily. In the ER she was afebrile, hemodynamically stable and saturating well on RA. Pt was evaluated by tele-neuro and did not qualify for tpa as she was outside the time window. Recommended for admission with MRI brain. 2018: In bed alert and oriented. Still complains of dizziness. Seen by neurology today. MRI pending. 10 systems reviewed and negative except as noted in HPI. Past Medical History:  
Diagnosis Date  Adverse effect of anesthesia   
 delayed awakening  Anticoagulated on Coumadin  Anxiety  Arthritis OA  Bleeding from nipple in female  Breast lump   
 left  Chronic pain   
 shoulders and knees  CKD (chronic kidney disease) 2015  
 resolved per patient  Current use of long term anticoagulation   
 pt takes coumadin due to hx of CVA  Difficult intubation per patient \"I was told my trachea takes a funny turn and need a shorter tube. \"   
 GERD (gastroesophageal reflux disease)   
 well controlled with med-  
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  History of DVT of lower extremity 1973 (left leg)     2007 (right leg, following knee surgery)  HTN (hypertension)   
 controlled with lisinopril  Hx of gout   
 managed with medication  Hyperlipidemia   
 no medication  Insomnia  Left ear hearing loss 11/11/2016  Mitral valve prolapse   
 last echo ?2004 at [facility] Morbid obesity [facility]) 11/13/15 BMI 44.9  MVP (mitral valve prolapse)  Pinched nerve in neck 9/2016  Post-operative nausea and vomiting   
 pt reports severe N/V : request e-mend or scopolamine patch; pt reports hoarseness, cough s/p intubation  PUD (peptic ulcer disease) 1980's  Right shoulder injury Reported injury while shopping 11/23/18  Stroke [facility]) 2007  
 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  
 after knee surgery  Torn rotator cuff   
 bilateral repairs. DO NOT PULL PT WITH HER ARMS OR SHOULDERS  
 Unspecified adverse effect of anesthesia 1983  
 delayed awakening with D&C  Vitamin D deficiency Past Surgical History:  
Procedure Laterality Date  HX APPENDECTOMY  HX BREAST BIOPSY Left 11/20/2015 BREAST NEEDLE LOCALIZED LUMPECTOMY LEFT / PREOP @1000/ BREAST CENTER @1130 FOR LEFT US NEEDLE LOC/ SURGERY @1330 performed by Columba Henley MD at [address] [place] [address] HX COLONOSCOPY  2015  
 polyps; pt states pre-cancerous [address], 2003 X3  
 HX DILATION AND CURETTAGE  2017  HX GYN  2017  
 cervical polyp removal  
 HX HEENT Bilateral at age 1  
 muscle & balances sx  HX HEENT  11/2017  
 resec of inferior turbinates & balloon sinuplasty Nikolas Ek HEENT  [id number] & 2011  
 eye surgery  HX HYSTERECTOMY  2018  
 robotic hysterectomy, BSO  HX ORTHOPAEDIC Right   
 knee sx X2; torn meniscus & acl torn  HX SEPTOPLASTY  [id number]  HX TONSILLECTOMY  SHOULDER SURG PROC UNLISTED Bilateral last ones 2006 & 2007 \"multiple\"; last shoulder sx pt states came in for left shoulder repair; pt states while being moved from stretcher to OR table operating employee pulled on Right arm while pt telling her to stop & pt states right rotator cuff was torn Allergies Allergen Reactions  Clindamycin Anaphylaxis  Iodinated Contrast- Oral And Iv Dye Anaphylaxis  Tramadol Hcl Anaphylaxis  Ultram [Tramadol] Anaphylaxis  Aloe Vera Hives  Codeine Itching and Nausea and Vomiting  Heparin Analogues Hives Pt states ok to take warfarin.  Lanolin Hives  Other Medication Hives and Rash ! Allergic to most antibotics 2 dermabond 3 maracaine  Vigamox [Moxifloxacin] Itching  Aloe Hives  Aspartame Nausea and Vomiting  Cefzil [Cefprozil] Rash  Fentanyl Nausea and Vomiting  Heparin Other (comments)  Morphine Nausea and Vomiting  Oxycodone Hcl Nausea and Vomiting  Potassium Clavulanate Other (comments) Severe leg cramps  Propoxyphene Napsylate Other (comments)  Sulfa (Sulfonamide Antibiotics) Itching  Varicella Virus Vacc Live (Pf) Hives and Swelling Shingle vaccine Social History Tobacco Use  Smoking status: Never Smoker  Smokeless tobacco: Never Used Substance Use Topics  Alcohol use: No  
  
Family History Problem Relation Age of Onset  Cancer Mother   
     lung cancer  Parkinsonism Father  Cancer Brother   
     nose cancer  Cancer Brother   
     lung cancer  Breast Cancer Neg Hx   
 Ovarian Cancer Neg Hx Immunization History Administered Date(s) Administered  Influenza Vaccine 10/12/2014  Influenza Vaccine (Quad) PF 09/17/2018  Pneumococcal Vaccine (Unspecified Type) 01/01/2005, 01/01/2010  TB Skin Test (PPD) 01/01/2011  TB Skin Test (PPD) Intradermal 12/01/2018  Zoster Vaccine, Live 11/03/2017 PTA Medications: 
Prior to Admission Medications Prescriptions Last Dose Informant Patient Reported? Taking? DULoxetine (CYMBALTA) 60 mg capsule   No Yes Sig: Take 1 Cap by mouth daily. Indications: Chronic Musculoskeletal Pain, major depressive disorder Patient taking differently: Take 60 mg by mouth every morning. LYRICA 50 mg capsule   Yes Yes Sig: Take 1 Tab by mouth two (2) times a day. OMEGA-3 FATTY ACIDS/FISH OIL (OMEGA 3 FISH OIL PO)   Yes No  
Sig: Take 2,100 mg by mouth daily. 2100 mg cap--1 cap po qd   
allopurinol (ZYLOPRIM) 300 mg tablet   No Yes Sig: Take 1 Tab by mouth nightly. colchicine (COLCRYS) 0.6 mg tablet   No No  
Sig: Acute gout: 1.2 mg (2 tabs) PO at signs of attack then 0.6 mg (1 tab) 1 h later. No more than 3 tablets in the first 24 h. Continue taking 1 tablet daily. Patient taking differently: Take 0.6 mg by mouth as needed. Acute gout: 1.2 mg (2 tabs) PO at signs of attack then 0.6 mg (1 tab) 1 h later. No more than 3 tablets in the first 24 h. Continue taking 1 tablet daily. Indications: acute gouty arthritis  
cyanocobalamin 1,000 mcg tablet   Yes Yes Sig: Take 1,500 mcg by mouth daily. Indications: PREVENTION OF VITAMIN B12 DEFICIENCY, hold until after surgery  
doxycycline (VIBRAMYCIN) 100 mg capsule   No Yes Sig: Take 1 Cap by mouth two (2) times a day. lisinopril (PRINIVIL, ZESTRIL) 20 mg tablet   No Yes Sig: Take 1 Tab by mouth daily. Indications: hypertension Patient taking differently: Take 20 mg by mouth every evening. Indications: hypertension  
omeprazole (PRILOSEC) 40 mg capsule   No Yes Sig: Take 1 Cap by mouth two (2) times a day.  Indications: gastroesophageal reflux disease  
ondansetron (ZOFRAN ODT) 4 mg disintegrating tablet   No No  
Si tab Q 8 Hours PRN N/V  Indications: PREVENTION OF POST-OPERATIVE NAUSEA AND VOMITING  
oxyCODONE-acetaminophen (PERCOCET) 5-325 mg per tablet   No No  
Sig: Take 1 Tab by mouth every four (4) hours as needed for Pain. Max Daily Amount: 6 Tabs. temazepam (RESTORIL) 30 mg capsule   No Yes Sig: Take 1 Cap by mouth nightly. Max Daily Amount: 30 mg.  
tiZANidine (ZANAFLEX) 4 mg tablet   No No  
Sig: Take 1 Tab by mouth three (3) times daily as needed. Indications: Muscle Spasm Patient taking differently: Take 4 mg by mouth three (3) times daily as needed. Takes for sleep  Indications: Muscle Spasm  
warfarin (COUMADIN) 6 mg tablet   No Yes Sig: Take 1 Tab by mouth daily. Patient taking differently: Take 6 mg by mouth daily. Holding for surgery- last dose 18- [facility] Facility-Administered Medications: None Objective:  
 
Patient Vitals for the past 24 hrs: 
 Temp Pulse Resp BP SpO2  
18 1146 98 °F (36.7 °C) 66 18 142/81 95 % 18 0807 98.1 °F (36.7 °C) 72 19 112/74 96 % 18 0400 98.8 °F (37.1 °C) 76 19 127/75 95 % 18 0000 98.6 °F (37 °C) 82 19 134/80 96 % 18 1919 98.1 °F (36.7 °C) 83 19 137/83 94 % 18 1600 98.6 °F (37 °C) 86 19 155/86 93 % Oxygen Therapy O2 Sat (%): 95 % (18 1146) Pulse via Oximetry: 85 beats per minute (18 0901) O2 Device: Room air (18 1600) Intake/Output Summary (Last 24 hours) at 2018 1540 Last data filed at 2018 2820 Gross per 24 hour Intake  Output 200 ml Net -200 ml Physical Exam: 
General:    Well nourished. Alert. Eyes:   Normal sclera. Extraocular movements intact. ENT:  Normocephalic, atraumatic. Moist mucous membranes. Nasal dressing from recent surgery C/D/I. CV:   RRR. No murmur, rub, or gallop. Lungs:  CTAB. No wheezing, rhonchi, or rales. Abdomen: Soft, nontender, nondistended. Bowel sounds normal. Ecchymosis to all regions of abdomen-recent Lovenox use for rhinoplasty prep (was on Coumadin). Extremities: Warm and dry. No cyanosis or edema. Neurologic: Decreased sensation to the right face. . Motor strength, RUE 3/5, LUE 5/5, RLE 1/5, LLE 5/5. Skin:     No rashes or jaundice. Psych:  Normal mood and affect. I reviewed the labs, imaging, EKGs, telemetry, and other studies done this admission. Data Review:  
Recent Results (from the past 24 hour(s)) METABOLIC PANEL, BASIC Collection Time: 12/02/18  5:17 AM  
Result Value Ref Range Sodium 144 136 - 145 mmol/L Potassium 3.8 3.5 - 5.1 mmol/L Chloride 109 (H) 98 - 107 mmol/L  
 CO2 24 21 - 32 mmol/L Anion gap 11 7 - 16 mmol/L Glucose 97 65 - 100 mg/dL BUN 18 8 - 23 MG/DL Creatinine 0.97 0.6 - 1.0 MG/DL  
 GFR est AA >60 >60 ml/min/1.73m2 GFR est non-AA >60 >60 ml/min/1.73m2 Calcium 8.5 8.3 - 10.4 MG/DL  
CBC W/O DIFF Collection Time: 12/02/18  5:17 AM  
Result Value Ref Range WBC 10.0 4.3 - 11.1 K/uL  
 RBC 4.53 4.05 - 5.2 M/uL  
 HGB 14.2 11.7 - 15.4 g/dL HCT 42.9 35.8 - 46.3 % MCV 94.7 79.6 - 97.8 FL  
 MCH 31.3 26.1 - 32.9 PG  
 MCHC 33.1 31.4 - 35.0 g/dL  
 RDW 13.7 11.9 - 14.6 % PLATELET 435 860 - 500 K/uL MPV 9.9 9.4 - 12.3 FL ABSOLUTE NRBC 0.00 0.0 - 0.2 K/uL HEMOGLOBIN A1C WITH EAG Collection Time: 12/02/18  5:17 AM  
Result Value Ref Range Hemoglobin A1c 5.0 4.8 - 6.0 % Est. average glucose 97 mg/dL LIPID PANEL Collection Time: 12/02/18  5:17 AM  
Result Value Ref Range LIPID PROFILE Cholesterol, total 166 <200 MG/DL Triglyceride 203 (H) 35 - 150 MG/DL  
 HDL Cholesterol 35 (L) 40 - 60 MG/DL  
 LDL, calculated 90.4 <100 MG/DL VLDL, calculated 40.6 (H) 6.0 - 23.0 MG/DL  
 CHOL/HDL Ratio 4.7 PLEASE READ & DOCUMENT PPD TEST IN 24 HRS Collection Time: 12/02/18 10:47 AM  
Result Value Ref Range PPD  Negative  
 mm Induration 0 mm All Micro Results None Other Studies: 
Mri Brain Wo Cont Result Date: 12/1/2018 MRI BRAIN WITHOUT and WITH CONTRAST. HISTORY: Dizziness and weakness and facial droop. COMPARISON:  None. Study performed within 24 hours of admission. TECHNIQUE:  Sagittal T1, axial T1, T2, FLAIR, gradient echo, diffusion with ADC map and coronal FLAIR. FINDINGS:  Diffusion images do not demonstrate any areas of restricted diffusion to suggest acute infarction. A few small scattered nonspecific periventricular and centrum semiovale FLAIR and T2 white matter hyperintensities are present. No midline shift, mass or mass effect. Gradient echo images are unremarkable. No evidence of acute hemorrhage. The lateral ventricles are normal size. The pituitary, parasellar and midline structures are unremarkable on the sagittal T1 images. There are normal T2 flow-voids in the major vessels. Posterior fossa structures are unremarkable. The basal ganglia appear symmetric. Orbits are grossly normal.  Paranasal sinuses markable except for small mucous retention cyst in the right maxillary sinus. IMPRESSION: No acute infarct. Minimal chronic small vessel disease changes. Assessment and Plan:  
 
Hospital Problems as of 12/2/2018 Date Reviewed: 11/27/2018 Codes Class Noted - Resolved POA * (Principal) Acute CVA (cerebrovascular accident) ([facility] [address] ICD-10-CM: I63.9 ICD-9-CM: 434.91  12/1/2018 - Present Yes Acute CVA: With right facial droop-resolved, right upper and lower extremities weakness. Dizziness. CT head negative. Will check MRI. At home on ASA 81. Will place on 325 mg and Lipitor 40. PT/OT/ST. Lipid panel and a1c. Will check Echo and carotids. Pt is allergic to contrast. Seen by Dr. Walt Amaral.  
Previous history of CVA: Pt reports optic eye stroke 2007, pt describes it as a stroke to her left eye vessel but caused left eye impaired vision and left ear hearing loss. Essential hypertension, hypertensive urgency: Continue home medications. Will allow for permissive hypertension. Gout: On Allopurinol and Colchicine prn. Will continue Allopurinol. History of DVT after knee surgery and is on chronic AC with Warfarin: INR 1.5. Will continue with Warfarin for now. May need to hold if significant size stroke. CT head neg for bleeding. Neurology service consulted for assist.  
 
Recent rhinoplasty reversal surgery (11/27/18): Warfarin resumed and Lovenox bridge was already discontinued prior to presentation. Continue on Doxycycline. Patient states having a follow up appointment tomorrow 12/3/2018. I spoke with the charge nurse and will pass this information on so that notification can be sent to Dr. Shavon Alford office. Hx of mitral valve prolapse. DVT ppx: Warfarin Code status:  Full Estimated LOS:  Greater than 2 midnights Risk:  high Signed: 
Earnest Galarza NP

## 2018-12-02 NOTE — PROGRESS NOTES
Consult Patient: Lucille Sutton MRN: [id number]  SSN: [id number] YOB: 1955  Age: 58 y.o. Sex: female Assessment:  
 
Acute onset vertigo with apparent transient right-sided weakness. Potential posterior circulation stroke History of central retinal artery occlusion on chronic Coumadin Hospital Problems  Date Reviewed: 11/27/2018 Codes Class Noted POA * (Principal) Acute CVA (cerebrovascular accident) ([facility] [address] ICD-10-CM: I63.9 ICD-9-CM: 434.91  12/1/2018 Yes Plan:  
 
Cancel CTA did a contrast allergy. MRA of head and neck. 2-D echocardiogram, consider ALICIA 
EKG monitoring consider looking recorder Hemoglobin A1c, lipid panel Physical therapy occupational therapy speech therapy Subjective: Interval history: Continued vertigo with head movements. No weakness Lucille Sutton is a 58 y.o. female who is being seen for Evaluation of possible stroke. The patient has a history of central retinal artery occlusion and is on chronic Coumadin therapy. She was in her usual state that around 3 AM last night when she got up to the bathroom and developed severe vertigo and right-sided weakness patient was brought to [facility] emergency Department as a potential stroke. She she essentially shortly thereafter by specialist on call. NIH stroke scale was for questionable right-sided weakness. She did not receive TPA. .  She continues to have vertigo whenever she moves her head but does not have vertigo if she lays still. She denies any facial paresthesias or diplopia. Past Medical History:  
Diagnosis Date  Adverse effect of anesthesia   
 delayed awakening  Anticoagulated on Coumadin  Anxiety  Arthritis OA  Bleeding from nipple in female  Breast lump   
 left  Chronic pain   
 shoulders and knees  CKD (chronic kidney disease) 11/13/2015  
 resolved per patient  Current use of long term anticoagulation pt takes coumadin due to hx of CVA  Difficult intubation   
 per patient \"I was told my trachea takes a funny turn and need a shorter tube. \"   
 GERD (gastroesophageal reflux disease)   
 well controlled with med-  
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  History of DVT of lower extremity 1973 (left leg)     2007 (right leg, following knee surgery)  HTN (hypertension)   
 controlled with lisinopril  Hx of gout   
 managed with medication  Hyperlipidemia   
 no medication  Insomnia  Left ear hearing loss 11/11/2016  Mitral valve prolapse   
 last echo ?2004 at [facility] Morbid obesity [facility]) 11/13/15 BMI 44.9  MVP (mitral valve prolapse)  Pinched nerve in neck 9/2016  Post-operative nausea and vomiting   
 pt reports severe N/V : request e-mend or scopolamine patch; pt reports hoarseness, cough s/p intubation  PUD (peptic ulcer disease) 1980's  Right shoulder injury Reported injury while shopping 11/23/18  Stroke [facility]) 2007  
 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  
 after knee surgery  Torn rotator cuff   
 bilateral repairs. DO NOT PULL PT WITH HER ARMS OR SHOULDERS  
 Unspecified adverse effect of anesthesia 1983  
 delayed awakening with D&C  Vitamin D deficiency Past Surgical History:  
Procedure Laterality Date  HX APPENDECTOMY  HX BREAST BIOPSY Left 11/20/2015 BREAST NEEDLE LOCALIZED LUMPECTOMY LEFT / PREOP @1000/ BREAST CENTER @1130 FOR LEFT US NEEDLE LOC/ SURGERY @1330 performed by Harjeet Calloway MD at [address] [place] [address] HX COLONOSCOPY  2015  
 polyps; pt states pre-cancerous [address], 2003 X3  
 HX DILATION AND CURETTAGE  2017  HX GYN  2017  
 cervical polyp removal  
 HX HEENT Bilateral at age 2  
 muscle & balances sx  HX HEENT  11/2017  
 resec of inferior turbinates & balloon sinuplasty [place] [facility] HEENT  [id number] & 2011  
 eye surgery  HX HYSTERECTOMY  2018  
 robotic hysterectomy, BSO  HX ORTHOPAEDIC Right   
 knee sx X2; torn meniscus & acl torn  HX SEPTOPLASTY  [id number]  HX TONSILLECTOMY  SHOULDER SURG PROC UNLISTED Bilateral last ones 2006 & 2007 \"multiple\"; last shoulder sx pt states came in for left shoulder repair; pt states while being moved from stretcher to OR table operating employee pulled on Right arm while pt telling her to stop & pt states right rotator cuff was torn Family History Problem Relation Age of Onset  Cancer Mother   
     lung cancer  Parkinsonism Father  Cancer Brother   
     nose cancer  Cancer Brother   
     lung cancer  Breast Cancer Neg Hx   
 Ovarian Cancer Neg Hx Social History Tobacco Use  Smoking status: Never Smoker  Smokeless tobacco: Never Used Substance Use Topics  Alcohol use: No  
  
Current Facility-Administered Medications Medication Dose Route Frequency Provider Last Rate Last Dose  allopurinol (ZYLOPRIM) tablet 300 mg  300 mg Oral QHS Tee Castellanos MD   300 mg at 12/01/18 2108  doxycycline (VIBRAMYCIN) capsule 100 mg  100 mg Oral BID Tee Castellanos MD   100 mg at 12/02/18 0087  DULoxetine (CYMBALTA) capsule 60 mg  60 mg Oral DAILY Tee Castellanos MD   60 mg at 12/02/18 0910  
 lisinopril (PRINIVIL, ZESTRIL) tablet 20 mg  20 mg Oral QPM Tee Castellanos MD   20 mg at 12/01/18 1830  pregabalin (LYRICA) capsule 50 mg  50 mg Oral BID Tee Castellanos MD   50 mg at 12/02/18 4050  pantoprazole (PROTONIX) tablet 40 mg  40 mg Oral BID Tee Castellanos MD   40 mg at 12/02/18 8042  temazepam (RESTORIL) capsule 30 mg  30 mg Oral QHS Tee Castellanos MD   30 mg at 12/01/18 2107  oxyCODONE-acetaminophen (PERCOCET) 5-325 mg per tablet 1 Tab  1 Tab Oral Q4H PRN Antoinette Ballard MD   1 Tab at 12/01/18 1829  warfarin (COUMADIN) tablet 6 mg  6 mg Oral QPM Tee Castellanos MD   6 mg at 12/01/18 1829  
 sodium chloride (NS) flush 5-10 mL  5-10 mL IntraVENous Q8H Tee Castellanos MD   5 mL at 12/02/18 7510  sodium chloride (NS) flush 5-10 mL  5-10 mL IntraVENous PRN Tee Castellanos MD      
 0.9% sodium chloride infusion  75 mL/hr IntraVENous CONTINUOUS Tee Castellanos MD 75 mL/hr at 12/02/18 0430 75 mL/hr at 12/02/18 0430  acetaminophen (TYLENOL) tablet 650 mg  650 mg Oral Q4H PRN Tee Castellanos MD   650 mg at 12/02/18 0430  
 ondansetron (ZOFRAN) injection 4 mg  4 mg IntraVENous Q4H PRN Tee Castellanos MD   4 mg at 12/01/18 1829  
 aspirin delayed-release tablet 325 mg  325 mg Oral DAILY Tee Castellanos MD   325 mg at 12/02/18 0910  
 atorvastatin (LIPITOR) tablet 40 mg  40 mg Oral DAILY Tee Castellanos MD   40 mg at 12/02/18 6614 Allergies Allergen Reactions  Clindamycin Anaphylaxis  Iodinated Contrast- Oral And Iv Dye Anaphylaxis  Tramadol Hcl Anaphylaxis  Ultram [Tramadol] Anaphylaxis  Aloe Vera Hives  Codeine Itching and Nausea and Vomiting  Heparin Analogues Hives Pt states ok to take warfarin.  Lanolin Hives  Other Medication Hives and Rash ! Allergic to most antibotics 2 dermabond 3 maracaine  Vigamox [Moxifloxacin] Itching  Aloe Hives  Aspartame Nausea and Vomiting  Cefzil [Cefprozil] Rash  Fentanyl Nausea and Vomiting  Heparin Other (comments)  Morphine Nausea and Vomiting  Oxycodone Hcl Nausea and Vomiting  Potassium Clavulanate Other (comments) Severe leg cramps  Propoxyphene Napsylate Other (comments)  Sulfa (Sulfonamide Antibiotics) Itching  Varicella Virus Vacc Live (Pf) Hives and Swelling Shingle vaccine Review of Systems: 12 point review of systems is otherwise negative Objective:  
 
Vitals:  
 12/01/18 1919 12/02/18 0000 12/02/18 0400 12/02/18 0906 BP: 137/83 134/80 127/75 112/74 Pulse: 83 82 76 72 Resp: 19 19 19 19 Temp: 98.1 °F (36.7 °C) 98.6 °F (37 °C) 98.8 °F (37.1 °C) 98.1 °F (36.7 °C) SpO2: 94% 96% 95% 96% Weight:      
Height:      
  
 
Physical Exam: 
 
 
General: well nourished, appears stated age Eyes: no proptosis or exophthalmos; conjunctivae clear, sclerae non-icteric Chest: clear to auscultation Cardiac: normal S1 S2; no murmurs gallop or rubs Neurological: MSE: alert, oriented times 3; fluent speech; no paraphasic errors; follows commands without difficulty CN 2: visual fields full; no afferent pupillary defect; VA not checked; fundoscopic exam .. Jeffrey Soto Not performed CN 3,4,6: Pupils symmetrical in size, reactive to light directly and consensually; no ptosis; full versions and ductions; No nystagmus is present CN 5: facial sensation intact to light touch and pin prick. Corneal reflex . Jeffrey Soto Not performed CN 7: Symmetrical facial tone and movements CN 8 responds to spoken voice CN 9,10; palate symmetrical gag intact CN 11: head turn and shoulder shrug intact CN 12: tongue midline without atrophy or fasiculations Motor: 
Power 5/5 UE and LE proximal to distal 
Fine motor movements symmetrical 
Tone normal 
Atrophy: absent Gait: Not assessed Cerebellar: 
Finger to nose; heel to shin intact Tandem iNot checked Sensory RombergNot performed Intact to primary modalities in all 4 extremities Reflexes Symmetrical and normally active at 2+ in UE and LE Plantar response flexor bilaterally Recent Results (from the past 24 hour(s)) METABOLIC PANEL, BASIC Collection Time: 12/02/18  5:17 AM  
Result Value Ref Range Sodium 144 136 - 145 mmol/L Potassium 3.8 3.5 - 5.1 mmol/L Chloride 109 (H) 98 - 107 mmol/L  
 CO2 24 21 - 32 mmol/L  Anion gap 11 7 - 16 mmol/L  
 Glucose 97 65 - 100 mg/dL BUN 18 8 - 23 MG/DL Creatinine 0.97 0.6 - 1.0 MG/DL  
 GFR est AA >60 >60 ml/min/1.73m2 GFR est non-AA >60 >60 ml/min/1.73m2 Calcium 8.5 8.3 - 10.4 MG/DL  
CBC W/O DIFF Collection Time: 12/02/18  5:17 AM  
Result Value Ref Range WBC 10.0 4.3 - 11.1 K/uL  
 RBC 4.53 4.05 - 5.2 M/uL  
 HGB 14.2 11.7 - 15.4 g/dL HCT 42.9 35.8 - 46.3 % MCV 94.7 79.6 - 97.8 FL  
 MCH 31.3 26.1 - 32.9 PG  
 MCHC 33.1 31.4 - 35.0 g/dL  
 RDW 13.7 11.9 - 14.6 % PLATELET 811 201 - 213 K/uL MPV 9.9 9.4 - 12.3 FL ABSOLUTE NRBC 0.00 0.0 - 0.2 K/uL HEMOGLOBIN A1C WITH EAG Collection Time: 12/02/18  5:17 AM  
Result Value Ref Range Hemoglobin A1c 5.0 4.8 - 6.0 % Est. average glucose 97 mg/dL LIPID PANEL Collection Time: 12/02/18  5:17 AM  
Result Value Ref Range LIPID PROFILE Cholesterol, total 166 <200 MG/DL Triglyceride 203 (H) 35 - 150 MG/DL  
 HDL Cholesterol 35 (L) 40 - 60 MG/DL  
 LDL, calculated 90.4 <100 MG/DL VLDL, calculated 40.6 (H) 6.0 - 23.0 MG/DL  
 CHOL/HDL Ratio 4.7 PLEASE READ & DOCUMENT PPD TEST IN 24 HRS Collection Time: 12/02/18 10:47 AM  
Result Value Ref Range PPD  Negative  
 mm Induration 0 mm Lab Results Component Value Date/Time Cholesterol, total 166 12/02/2018 05:17 AM  
 HDL Cholesterol 35 (L) 12/02/2018 05:17 AM  
 LDL, calculated 90.4 12/02/2018 05:17 AM  
 VLDL, calculated 40.6 (H) 12/02/2018 05:17 AM  
 Triglyceride 203 (H) 12/02/2018 05:17 AM  
 CHOL/HDL Ratio 4.7 12/02/2018 05:17 AM  
  
 
Lab Results Component Value Date/Time Hemoglobin A1c 5.0 12/02/2018 05:17 AM  
  
 
CT Results (most recent): 
Results from Hospital Encounter encounter on 12/01/18 CT HEAD WO CONT Narrative EXAM:  Noncontrast CT head. DATE:  December 1, 2018. INDICATION:  Right-sided facial droop and right leg weakness. COMPARISON: Prior CT brain on September 5, 2007. TECHNIQUE: Axial noncontrast CT images of the head were obtained. Radiation dose reduction techniques were used for this study. Our CT scanners 
use one or all of the following: Automated exposure control, adjustment of the 
mA and/or kV according to patient size, iterative reconstruction. FINDINGS:  The brain, ventricles and posterior fossa structures are within 
normal limits. No acute infarct, hemorrhage or mass is identified. There is no 
mass effect, midline shift or evidence of hydrocephalus. No fracture is seen in 
the skull or skull base. The mastoids are clear. Mild mucosal thickening is 
noted in the right maxillary sinus. Impression IMPRESSION:  No acute intracranial process. Results for orders placed or performed during the hospital encounter of 12/01/18 EKG, 12 LEAD, INITIAL Result Value Ref Range Ventricular Rate 66 BPM  
 Atrial Rate 66 BPM  
 P-R Interval 136 ms QRS Duration 70 ms Q-T Interval 392 ms QTC Calculation (Bezet) 410 ms Calculated P Axis 60 degrees Calculated R Axis 23 degrees Calculated T Axis 34 degrees Diagnosis    
  !!! Poor data quality, interpretation may be adversely affected Sinus rhythm with sinus arrhythmia Low voltage QRS Borderline ECG When compared with ECG of 24-SEP-2016 10:27, No significant change was found Confirmed by ST PARTH MARTINEZ MD (), SWATHI ORTEGA ([id number]) on 12/1/2018 9:38:06 AM 
  
  
 
MRI Results (most recent): 
Results from [facility] Encounter encounter on 12/01/18 MRI BRAIN WO CONT Narrative MRI BRAIN WITHOUT and WITH CONTRAST. HISTORY: Dizziness and weakness and facial droop. COMPARISON:  None. Study performed within 24 hours of admission. TECHNIQUE:  Sagittal T1, axial T1, T2, FLAIR, gradient echo, diffusion with ADC 
map and coronal FLAIR. FINDINGS:  Diffusion images do not demonstrate any areas of restricted diffusion 
to suggest acute infarction.   A few small scattered nonspecific periventricular 
and centrum semiovale FLAIR and T2 white matter hyperintensities are present. No 
midline shift, mass or mass effect. Gradient echo images are unremarkable. No 
evidence of acute hemorrhage. The lateral ventricles are normal size. The 
pituitary, parasellar and midline structures are unremarkable on the sagittal T1 
images. There are normal T2 flow-voids in the major vessels. Posterior fossa 
structures are unremarkable. The basal ganglia appear symmetric. Orbits are 
grossly normal.  Paranasal sinuses markable except for small mucous retention 
cyst in the right maxillary sinus. Impression IMPRESSION: No acute infarct. Minimal chronic small vessel disease changes. US Results (most recent): 
Results from Hospital Encounter encounter on 12/18/17 US PELV NON OB W TV  
 Narrative TRANSABDOMINAL AND TRANSVAGINAL PELVIC SONOGRAPHY CLINICAL HISTORY:  Pelvic pain with a history of endometrial polyp. COMPARISON:  CT of July 26, 2017 as well as report of all but ultrasound from Dr. Fam Griffin office dated July 12, 2017 (although those images are not 
available for direct comparison). FINDINGS:  Transabdominal and transvaginal images show that the uterus is 
slightly enlarged. It measures 9.4 x 3.8 x 5.9 cm with endometrial stripe of 9 
mm, which is thickened after menopause. Multiple leiomyomata are again noted. The largest at the posterior aspect of the body measures 2.7 x 2.0 x 1.6 mm 
today, apparently increased from 1.4 x 1.6 x 1.1 cm in July. The right ovary 
measures 2.6 x 1.0 x 1.7 cm and is normal in contour and echogenicity. The left 
ovary measures 1.7 x 1.2 x 1.3 cm and is normal in contour and echogenicity. No 
adnexal mass or abnormal fluid collection is identified. Impression IMPRESSION:   
 
1.  Multiple uterine leiomyomata with interval increase in size of the mass at 
the posterior body of the uterus compared with report of in-office ultrasound dated July 12, 2017. 
 
2.  Persistent post menopausal endometrial thickening. Most recent MRI Results from Hospital Encounter encounter on 12/01/18 MRI BRAIN WO CONT Narrative MRI BRAIN WITHOUT and WITH CONTRAST. HISTORY: Dizziness and weakness and facial droop. COMPARISON:  None. Study performed within 24 hours of admission. TECHNIQUE:  Sagittal T1, axial T1, T2, FLAIR, gradient echo, diffusion with ADC 
map and coronal FLAIR. FINDINGS:  Diffusion images do not demonstrate any areas of restricted diffusion 
to suggest acute infarction. A few small scattered nonspecific periventricular 
and centrum semiovale FLAIR and T2 white matter hyperintensities are present. No 
midline shift, mass or mass effect. Gradient echo images are unremarkable. No 
evidence of acute hemorrhage. The lateral ventricles are normal size. The 
pituitary, parasellar and midline structures are unremarkable on the sagittal T1 
images. There are normal T2 flow-voids in the major vessels. Posterior fossa 
structures are unremarkable. The basal ganglia appear symmetric. Orbits are 
grossly normal.  Paranasal sinuses markable except for small mucous retention 
cyst in the right maxillary sinus. Impression IMPRESSION: No acute infarct. Minimal chronic small vessel disease changes. Signed By: Lashell Reyna MD   
 December 2, 2018

## 2018-12-03 LAB
ANION GAP SERPL CALC-SCNC: 6 MMOL/L (ref 7–16)
APPEARANCE UR: CLEAR
BACTERIA URNS QL MICRO: ABNORMAL /HPF
BILIRUB UR QL: NEGATIVE
BUN SERPL-MCNC: 20 MG/DL (ref 8–23)
CALCIUM SERPL-MCNC: 8.6 MG/DL (ref 8.3–10.4)
CASTS URNS QL MICRO: 0 /LPF
CHLORIDE SERPL-SCNC: 112 MMOL/L (ref 98–107)
CO2 SERPL-SCNC: 26 MMOL/L (ref 21–32)
COLOR UR: YELLOW
CREAT SERPL-MCNC: 0.97 MG/DL (ref 0.6–1)
EPI CELLS #/AREA URNS HPF: ABNORMAL /HPF
ERYTHROCYTE [DISTWIDTH] IN BLOOD BY AUTOMATED COUNT: 13.4 % (ref 11.9–14.6)
GLUCOSE SERPL-MCNC: 93 MG/DL (ref 65–100)
GLUCOSE UR STRIP.AUTO-MCNC: NEGATIVE MG/DL
HCT VFR BLD AUTO: 42.6 % (ref 35.8–46.3)
HGB BLD-MCNC: 13.9 G/DL (ref 11.7–15.4)
HGB UR QL STRIP: NEGATIVE
INR PPP: 1.4
KETONES UR QL STRIP.AUTO: NEGATIVE MG/DL
LEUKOCYTE ESTERASE UR QL STRIP.AUTO: ABNORMAL
MCH RBC QN AUTO: 30.7 PG (ref 26.1–32.9)
MCHC RBC AUTO-ENTMCNC: 32.6 G/DL (ref 31.4–35)
MCV RBC AUTO: 94 FL (ref 79.6–97.8)
MM INDURATION POC: 0 MM (ref 0–5)
NITRITE UR QL STRIP.AUTO: NEGATIVE
NRBC # BLD: 0 K/UL (ref 0–0.2)
PH UR STRIP: 5.5 [PH] (ref 5–9)
PLATELET # BLD AUTO: 224 K/UL (ref 150–450)
PMV BLD AUTO: 10 FL (ref 9.4–12.3)
POTASSIUM SERPL-SCNC: 4.4 MMOL/L (ref 3.5–5.1)
PPD POC: NEGATIVE NEGATIVE
PROT UR STRIP-MCNC: NEGATIVE MG/DL
PROTHROMBIN TIME: 17.3 SEC (ref 11.5–14.5)
RBC # BLD AUTO: 4.53 M/UL (ref 4.05–5.2)
RBC #/AREA URNS HPF: 0 /HPF
SODIUM SERPL-SCNC: 144 MMOL/L (ref 136–145)
SP GR UR REFRACTOMETRY: 1.02 (ref 1–1.02)
UROBILINOGEN UR QL STRIP.AUTO: 0.2 EU/DL (ref 0.2–1)
WBC # BLD AUTO: 11.6 K/UL (ref 4.3–11.1)
WBC URNS QL MICRO: ABNORMAL /HPF

## 2018-12-03 PROCEDURE — 97530 THERAPEUTIC ACTIVITIES: CPT

## 2018-12-03 PROCEDURE — 96361 HYDRATE IV INFUSION ADD-ON: CPT

## 2018-12-03 PROCEDURE — 74011250637 HC RX REV CODE- 250/637: Performed by: INTERNAL MEDICINE

## 2018-12-03 PROCEDURE — 81001 URINALYSIS AUTO W/SCOPE: CPT

## 2018-12-03 PROCEDURE — 99232 SBSQ HOSP IP/OBS MODERATE 35: CPT | Performed by: NURSE PRACTITIONER

## 2018-12-03 PROCEDURE — 74011250637 HC RX REV CODE- 250/637: Performed by: PSYCHIATRY & NEUROLOGY

## 2018-12-03 PROCEDURE — 85027 COMPLETE CBC AUTOMATED: CPT

## 2018-12-03 PROCEDURE — 74011250636 HC RX REV CODE- 250/636: Performed by: INTERNAL MEDICINE

## 2018-12-03 PROCEDURE — 80048 BASIC METABOLIC PNL TOTAL CA: CPT

## 2018-12-03 PROCEDURE — 65660000000 HC RM CCU STEPDOWN

## 2018-12-03 PROCEDURE — 36415 COLL VENOUS BLD VENIPUNCTURE: CPT

## 2018-12-03 PROCEDURE — 65390000012 HC CONDITION CODE 44 OBSERVATION

## 2018-12-03 PROCEDURE — 85610 PROTHROMBIN TIME: CPT

## 2018-12-03 PROCEDURE — 77030020263 HC SOL INJ SOD CL0.9% LFCR 1000ML

## 2018-12-03 RX ORDER — BISACODYL 5 MG
5 TABLET, DELAYED RELEASE (ENTERIC COATED) ORAL
Status: DISCONTINUED | OUTPATIENT
Start: 2018-12-03 | End: 2018-12-05 | Stop reason: HOSPADM

## 2018-12-03 RX ORDER — PANTOPRAZOLE SODIUM 40 MG/1
40 TABLET, DELAYED RELEASE ORAL
Status: DISCONTINUED | OUTPATIENT
Start: 2018-12-03 | End: 2018-12-05 | Stop reason: HOSPADM

## 2018-12-03 RX ADMIN — PANTOPRAZOLE SODIUM 40 MG: 40 TABLET, DELAYED RELEASE ORAL at 17:19

## 2018-12-03 RX ADMIN — SALINE NASAL SPRAY 2 SPRAY: 1.5 SOLUTION NASAL at 12:25

## 2018-12-03 RX ADMIN — DULOXETINE 60 MG: 60 CAPSULE, DELAYED RELEASE ORAL at 08:51

## 2018-12-03 RX ADMIN — DIAZEPAM 5 MG: 5 TABLET ORAL at 02:16

## 2018-12-03 RX ADMIN — Medication 10 ML: at 22:00

## 2018-12-03 RX ADMIN — ASPIRIN 325 MG: 325 TABLET, DELAYED RELEASE ORAL at 08:50

## 2018-12-03 RX ADMIN — PREGABALIN 50 MG: 50 CAPSULE ORAL at 17:19

## 2018-12-03 RX ADMIN — BISACODYL 5 MG: 5 TABLET, COATED ORAL at 17:25

## 2018-12-03 RX ADMIN — SODIUM CHLORIDE 75 ML/HR: 900 INJECTION, SOLUTION INTRAVENOUS at 02:12

## 2018-12-03 RX ADMIN — Medication 5 ML: at 06:00

## 2018-12-03 RX ADMIN — LISINOPRIL 20 MG: 20 TABLET ORAL at 17:19

## 2018-12-03 RX ADMIN — PANTOPRAZOLE SODIUM 40 MG: 40 TABLET, DELAYED RELEASE ORAL at 06:11

## 2018-12-03 RX ADMIN — ALLOPURINOL 300 MG: 300 TABLET ORAL at 21:41

## 2018-12-03 RX ADMIN — DOXYCYCLINE HYCLATE 100 MG: 100 CAPSULE ORAL at 17:19

## 2018-12-03 RX ADMIN — ATORVASTATIN CALCIUM 40 MG: 40 TABLET, FILM COATED ORAL at 08:48

## 2018-12-03 RX ADMIN — SODIUM CHLORIDE 75 ML/HR: 900 INJECTION, SOLUTION INTRAVENOUS at 14:03

## 2018-12-03 RX ADMIN — WARFARIN SODIUM 9 MG: 5 TABLET ORAL at 17:19

## 2018-12-03 RX ADMIN — PREGABALIN 50 MG: 50 CAPSULE ORAL at 08:57

## 2018-12-03 RX ADMIN — PANTOPRAZOLE SODIUM 40 MG: 40 TABLET, DELAYED RELEASE ORAL at 08:51

## 2018-12-03 RX ADMIN — DIAZEPAM 5 MG: 5 TABLET ORAL at 23:28

## 2018-12-03 RX ADMIN — DOXYCYCLINE HYCLATE 100 MG: 100 CAPSULE ORAL at 08:51

## 2018-12-03 NOTE — PROGRESS NOTES
Problem: Mobility Impaired (Adult and Pediatric) Goal: *Acute Goals and Plan of Care (Insert Text) LTG: 
(1.)Ms. Noel Norris will move from supine to sit and sit to supine , scoot up and down and roll side to side in bed with MODIFIED INDEPENDENCE within 7 treatment day(s). (2.)Ms. Noel Norris will demonstrate good static and dynamic sitting balance within 7 days. (3.)Ms. Noel Norris will perform B LE therapeutic exercises x 20 reps with INDEPENDENCE within 7 days to increase strength for improved safety and independence in transfers and gait. Will add additional goals as pt is able to tolerate. 
________________________________________________________________________________________________ PHYSICAL THERAPY: Daily Note, Treatment Day: 1st, AM 12/3/2018INPATIENT: Hospital Day: 3 Payor: Arlander [employer] / Plan: [address] [place] [address] HMO / Product Type: [employer] Medicare /  
  
NAME/AGE/GENDER: Silvestre Mccormack is a 58 y.o. female PRIMARY DIAGNOSIS: Acute CVA (cerebrovascular accident) ([facility] [address] [I63.9] Acute CVA (cerebrovascular accident) ([facility] [address] [facility] ICD-10: Treatment Diagnosis:  
 · Generalized Muscle Weakness (M62.81) · Other lack of cordination (R27.8) · Difficulty in walking, Not elsewhere classified (R26.2) · History of falling (Z91.81) Precaution/Allergies: 
Clindamycin; Iodinated contrast- oral and iv dye; Tramadol hcl; Ultram [tramadol]; Aloe vera; Codeine; Heparin analogues; Lanolin; Other medication; Vigamox [moxifloxacin]; Aloe; Aspartame; Cefzil [cefprozil]; Fentanyl; Heparin; Morphine; Oxycodone hcl; Potassium clavulanate; Propoxyphene napsylate; Sulfa (sulfonamide antibiotics); and Varicella virus vacc live (pf)  
  
ASSESSMENT:  
Ms. Noel Norris was supine upon contact and agreeable to PT. Very motivated to participate but continues to have ongoing dizziness/vertigo with all movements.  Patient requires additional time throughout treatment for all movements. Patient able to perform supine to sit with SBA. Once seated EOB patient performs SPT to and from [facility] with CGA and good technique noted. Patient was then able to ambulate 100' with use of rolling walker, CGA and chair follow for safety. Patient requires several seated rest breaks throughout 100' of ambulation due to dizziness/vertigo. Patient requires additional time each seated rest break to recover from dizziness and ambulate further. Patient very motivated to ambulate. Overall good progress during physical therapy as noted by ability to participate in transfers and ambulation. Patient is appropriate for a PT re-evaluation for additional goals. Will continue efforts. This section established at most recent assessment PROBLEM LIST (Impairments causing functional limitations): 1. Decreased Strength 2. Decreased ADL/Functional Activities 3. Decreased Transfer Abilities 4. Decreased Ambulation Ability/Technique 5. Decreased Balance 6. Decreased Activity Tolerance 7. Decreased [place] with Home Exercise Program 
 INTERVENTIONS PLANNED: (Benefits and precautions of physical therapy have been discussed with the patient.) 1. Balance Exercise 2. Bed Mobility 3. Gait Training 4. Home Exercise Program (HEP) 5. Therapeutic Activites 6. Therapeutic Exercise/Strengthening 7. Transfer Training TREATMENT PLAN: Frequency/Duration: 3 times a week for duration of hospital stay Rehabilitation Potential For Stated Goals: Good RECOMMENDED REHABILITATION/EQUIPMENT: (at time of discharge pending progress): Due to the probability of continued deficits (see above) this patient will likely need continued skilled physical therapy after discharge. Equipment:  
? None at this time HISTORY:  
History of Present Injury/Illness (Reason for Referral): 
Per chart: Ms. Priti Portillo is a 57 yo female with a past medical history of Essential hypertension, CVA (optic eye stroke 2007, pt describes it as a stroke to her left eye vessel but caused left eye impaired vision and left ear hearing loss), gout (on allopurinol and Colchicine prn), mitral valve prolapse, history of DVT after knee surgery and is on chronic AC with Warfarin, recent rhinoplasty reversal surgery (11/27/18).   
Pt presented to the ER with symptoms of dizziness, right sided weakness and facial droop. Pt reports that ~ 10:30 pm while watching TV she started feeling spinning sensation and nauseous. She reports right upper and lower extremity weakness and right facial numbness and weakness. No fever, chills, CP, or urinary symptoms. Pt is at home on ASA 81 mg daily.  
  
In the ER she was afebrile, hemodynamically stable and saturating well on RA. Pt was evaluated by tele-neuro and did not qualify for tpa as she was outside the time window. Recommended for admission with MRI brain.  
  
Past Medical History/Comorbidities: Ms. Abimbola Felix  has a past medical history of Adverse effect of anesthesia, Anticoagulated on Coumadin, Anxiety, Arthritis, Bleeding from nipple in female, Breast lump, Chronic pain, CKD (chronic kidney disease), Current use of long term anticoagulation, Difficult intubation, GERD (gastroesophageal reflux disease), H/O right knee surgery, History of CVA (cerebrovascular accident), History of DVT of lower extremity, HTN (hypertension), gout, Hyperlipidemia, Insomnia, Left ear hearing loss, Mitral valve prolapse, Morbid obesity ([address] MVP (mitral valve prolapse), Pinched nerve in neck, Post-operative nausea and vomiting, PUD (peptic ulcer disease), Right shoulder injury, Stroke ([address] Thromboembolus ([address] Torn rotator cuff, Unspecified adverse effect of anesthesia, and Vitamin D deficiency.   Ms. Abimbola Felix  has a past surgical history that includes hx colonoscopy (2015); hx septoplasty (1972); hx heent (Bilateral, at age 1); hx tonsillectomy; hx heent (11/2017); hx heent (1957 & 2011); hx appendectomy; hx dilation and curettage (1982, 1999, 2003); hx dilation and curettage (2017); hx gyn (2017); hx hysterectomy (2018); hx orthopaedic (Right); pr shoulder surg proc unlisted (Bilateral, last ones 2006 & 2007); OPEN TIP RHINOPLASTY (N/A, 11/27/2018); TURBINATE REDUCTION (N/A, 8/27/2018); REPAIR OF VESTIBULAR STENOSSIS (N/A, 8/27/2018); TURBINATE REDUCTION (N/A, 6/4/2018); BILATERAL INTERNAL NASAL VALVE REPAIR WITH  ALLOGRAFT (Bilateral, 6/4/2018); HYSTERECTOMY ROBOTIC ASSISTED BILATERAL SALPINGO OOPHORECTOMY (Bilateral, 2/27/2018); BILATERAL MAXILLARY BALLOON SINUPLASTY (Bilateral, 11/20/2017); SUBMUCOUS RESECTION INFERIOR TURBINATES (N/A, 11/20/2017); DILATATION AND CURETTAGE HYSTEROSCOPY WITH MYOSURE, CERVICAL POLYPECTOMY, ENDOMETRIAL POLYPECTOMY (N/A, 8/2/2017); BRONCHOSCOPY (N/A, 12/31/2015); BREAST NEEDLE LOCALIZED LUMPECTOMY LEFT / PREOP @1000/ BREAST CENTER @1130 FOR LEFT US NEEDLE LOC/ SURGERY @1330 (Left, 11/20/2015); COLONOSCOPY/ BMI 43.89 (N/A, 7/23/2014); and ENDOSCOPIC POLYPECTOMY (N/A, 7/23/2014). Social History/Living Environment:  
Home Environment: Private residence # Steps to Enter: 4 Rails to Enter: No 
Wheelchair Ramp: No 
One/Two Story Residence: Two story # of Interior Steps: 13(then landing then 4 more) Interior Rails: Left Living Alone: No 
Support Systems: Child(zamzam) Patient Expects to be Discharged to[de-identified] Private residence Current DME Used/Available at Home: Walker, rolling Tub or Shower Type: Shower Prior Level of Function/Work/Activity: 
Pt lives with son. Pt is alone 2 days a week while son is at school. Pt lives in a 2 level home where bedroom is on second level. 3 steps to enter home from outside. There is a half bath on main level. Pt does drive. 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 Was ambulating independently with a rollator walker PRN. Personal Factors:   
      Sex:  female Age:  58 y.o. Number of Personal Factors/Comorbidities that affect the Plan of Care: 3+: HIGH COMPLEXITY EXAMINATION:  
Most Recent Physical Functioning:  
Gross Assessment: 
  
         
  
Posture: 
  
Balance: 
Sitting: Impaired Sitting - Static: Good (unsupported) Sitting - Dynamic: Good (unsupported) Standing: Impaired Standing - Static: Fair Standing - Dynamic : Fair Bed Mobility: 
Supine to Sit: Stand-by assistance Wheelchair Mobility: 
  
Transfers: 
Sit to Stand: Stand-by assistance;Contact guard assistance Stand to Sit: Stand-by assistance;Contact guard assistance Gait: 
  
Base of Support: Widened Speed/Rose: Shuffled; Slow Step Length: Left shortened;Right shortened Gait Abnormalities: Decreased step clearance;Trunk sway increased; Path deviations Distance (ft): 100 Feet (ft)(several seated rest breaks needed due to vertigo/dizziness) Assistive Device: Walker, rolling Ambulation - Level of Assistance: Contact guard assistance(chair follow for safety) Interventions: Safety awareness training; Tactile cues; Verbal cues Body Structures Involved: 1. Nerves 2. Muscles Body Functions Affected: 1. Neuromusculoskeletal 
2. Movement Related Activities and Participation Affected: 1. General Tasks and Demands 2. Mobility 3. Self Care Number of elements that affect the Plan of Care: 4+: HIGH COMPLEXITY CLINICAL PRESENTATION:  
Presentation: Evolving clinical presentation with changing clinical characteristics: MODERATE COMPLEXITY CLINICAL DECISION MAKING:  
[employer] 6 Clicks Basic Mobility Inpatient Short Form How much difficulty does the patient currently have. .. Unable A Lot A Little None 1. Turning over in bed (including adjusting bedclothes, sheets and blankets)? [] 1   [] 2   [x] 3   [] 4  
2.   Sitting down on and standing up from a chair with arms ( e.g., wheelchair, bedside commode, etc.)   [] 1   [x] 2   [] 3   [] 4  
 3. Moving from lying on back to sitting on the side of the bed? [] 1   [x] 2   [] 3   [] 4 How much help from another person does the patient currently need. .. Total A Lot A Little None 4. Moving to and from a bed to a chair (including a wheelchair)? [] 1   [x] 2   [] 3   [] 4  
5. Need to walk in hospital room? [] 1   [x] 2   [] 3   [] 4  
6. Climbing 3-5 steps with a railing? [x] 1   [] 2   [] 3   [] 4  
© 2007, Trustees of [address] [place] [address] [address] [id number], under license to [employer]. All rights reserved Score:  Initial: 12 Most Recent: X (Date: -- ) Interpretation of Tool:  Represents activities that are increasingly more difficult (i.e. Bed mobility, Transfers, Gait). Score 24 23 22-20 19-15 14-10 9-7 6 Modifier CH CI CJ CK CL CM CN   
 
? Mobility - Walking and Moving Around:  
  - CURRENT STATUS: CL - 60%-79% impaired, limited or restricted  - GOAL STATUS: CL - 60%-79% impaired, limited or restricted  - D/C STATUS:  ---------------To be determined--------------- Payor: Chasity Gallardo / Plan: [address] [place] [address] HMO / Product Type: Managed Care Medicare /   
 
Medical Necessity:    
· Patient demonstrates good rehab potential due to higher previous functional level. Reason for Services/Other Comments: 
· Patient continues to require present interventions due to patient's inability to function at baseline. Use of outcome tool(s) and clinical judgement create a POC that gives a: Clear prediction of patient's progress: LOW COMPLEXITY  
  
 
 
 
TREATMENT:  
(In addition to Assessment/Re-Assessment sessions the following treatments were rendered) Pre-treatment Symptoms/Complaints:  C/o dizziness/vertigo Pain: Initial:  
Pain Intensity 1: 0  Post Session:  0/10 Therapeutic Activity: (    40 minutes):   Therapeutic activities including bed mobility training, transfer training from various surface heights, ambulation on level ground, static/dynamic standing balance training, scooting, posture training, instruction in sequencing with rolling walker, and patient education to improve mobility, strength and coordination. Required moderateSafety awareness training; Tactile cues; Verbal cues to promote static and dynamic balance in standing, promote coordination of bilateral, lower extremity(s) and functional independence. Braces/Orthotics/Lines/Etc:  
· O2 Device: Room air Treatment/Session Assessment:   
· Response to Treatment:  See above · Interdisciplinary Collaboration:  
o Physical Therapy Assistant 
o Registered Nurse · After treatment position/precautions:  
o Supine in bed 
o Bed/Chair-wheels locked 
o Bed in low position 
o Call light within reach 
o RN notified · Compliance with Program/Exercises: Will assess as treatment progresses · Recommendations/Intent for next treatment session: \"Next visit will focus on advancements to more challenging activities and reduction in assistance provided\". Total Treatment Duration: PT Patient Time In/Time Out Time In: 8367 Time Out: 6074 Javier Zurita, PTA

## 2018-12-03 NOTE — PROGRESS NOTES
Problem: Falls - Risk of 
Goal: *Absence of Falls Document Morales Zamora Fall Risk and appropriate interventions in the flowsheet. Outcome: Progressing Towards Goal 
Fall Risk Interventions: 
Mobility Interventions: Bed/chair exit alarm, Communicate number of staff needed for ambulation/transfer, OT consult for ADLs, Patient to call before getting OOB, PT Consult for mobility concerns, PT Consult for assist device competence Medication Interventions: Bed/chair exit alarm, Evaluate medications/consider consulting pharmacy, Patient to call before getting OOB, Teach patient to arise slowly Elimination Interventions: Bed/chair exit alarm, Call light in reach, Elevated toilet seat, Patient to call for help with toileting needs, Toilet paper/wipes in reach, Toileting schedule/hourly rounds History of Falls Interventions: Bed/chair exit alarm, Consult care management for discharge planning

## 2018-12-03 NOTE — PROGRESS NOTES
Problem: Interdisciplinary Rounds Goal: Interdisciplinary Rounds Outcome: Progressing Towards Goal 
Interdisciplinary team rounds were held 12/3/2018 with the following team members:Care Management, Occupational Therapy, Physician and  and the patient. Anticipate discharge home when medically ready. Plan of care discussed. See clinical pathway and/or care plan for interventions and desired outcomes.

## 2018-12-03 NOTE — PROGRESS NOTES
Neurology Daily Progress Note Assessment:  
 
Acute onset vertigo with apparent transient right-sided weakness.    
History of central retinal artery occlusion on chronic Coumadin Plan:  
 
Physical therapy for treatment of acute vertigo Avoid vestibular sedatives Antiemetics as needed Continue warfarin Subjective: Interval history: 
 
Patient reports continued vertigo with no improvement. She also reports some continued weakness of right side, but reports that this is improving. Patient also reports isolated tremor of right fourth finger that occurs only with use. MRA of head/neck was negative. TTE was negative for shunt or LAD. History: 
 
Salome To is a 58 y.o. female who is being seen for vertigo/potential stroke. Review of systems negative with exception of pertinent positives and negatives noted above. Objective:  
 
Vitals:  
 12/03/18 0004 12/03/18 0404 12/03/18 0800 12/03/18 1201 BP: 129/79 129/83 149/85 Pulse: 80 66 77 Resp: 20 20 18 Temp: 97.5 °F (36.4 °C) 98.2 °F (36.8 °C) 97.7 °F (36.5 °C) SpO2: 96% 97% 93% Weight:    230 lb 8 oz (104.6 kg) Height:      
  
 
 
Current Facility-Administered Medications:  
  warfarin (COUMADIN) tablet 9 mg, 9 mg, Oral, QPM, Tee Castellanos MD 
  pantoprazole (PROTONIX) tablet 40 mg, 40 mg, Oral, ACB&D, Tee Castellanos MD 
  bisacodyl (DULCOLAX) tablet 5 mg, 5 mg, Oral, BID PRN, Luciana Conklin MD 
  sodium chloride (OCEAN) 0.65 % nasal squeeze bottle 2 Spray, 2 Spray, Both Nostrils, Q2H PRN, Luciana Conklin MD 
  diazePAM (VALIUM) tablet 5 mg, 5 mg, Oral, Q6H PRN, Dagoberto Estevez MD, 5 mg at 12/03/18 7882   allopurinol (ZYLOPRIM) tablet 300 mg, 300 mg, Oral, QHS, Tee Castellanos MD, 300 mg at 12/02/18 2052   doxycycline (VIBRAMYCIN) capsule 100 mg, 100 mg, Oral, BID, Tee Castellanos MD, 100 mg at 12/03/18 9585   DULoxetine (CYMBALTA) capsule 60 mg, 60 mg, Oral, DAILY, Tee Castellanos MD, 60 mg at 12/03/18 5054   lisinopril (PRINIVIL, ZESTRIL) tablet 20 mg, 20 mg, Oral, QPM, Tee Castellanos MD, 20 mg at 12/02/18 1740   pregabalin (LYRICA) capsule 50 mg, 50 mg, Oral, BID, Tee Castellanos MD, 50 mg at 12/03/18 4760   oxyCODONE-acetaminophen (PERCOCET) 5-325 mg per tablet 1 Tab, 1 Tab, Oral, Q4H PRN, Tee Castellanos MD, 1 Tab at 12/01/18 1829 
  sodium chloride (NS) flush 5-10 mL, 5-10 mL, IntraVENous, Q8H, Tee Castellanos MD, 5 mL at 12/03/18 0600 
  sodium chloride (NS) flush 5-10 mL, 5-10 mL, IntraVENous, PRN, Tee Castellanos MD, 10 mL at 12/02/18 2053 
  0.9% sodium chloride infusion, 75 mL/hr, IntraVENous, CONTINUOUS, Tee Castellanos MD, Last Rate: 75 mL/hr at 12/03/18 0212, 75 mL/hr at 12/03/18 0212   acetaminophen (TYLENOL) tablet 650 mg, 650 mg, Oral, Q4H PRN, Tee Castellanos MD, 650 mg at 12/02/18 0430 
  ondansetron (ZOFRAN) injection 4 mg, 4 mg, IntraVENous, Q4H PRN, Tee Castellanos MD, 4 mg at 12/01/18 1829 
  aspirin delayed-release tablet 325 mg, 325 mg, Oral, DAILY, Tee Castellanos MD, 325 mg at 12/03/18 9587   atorvastatin (LIPITOR) tablet 40 mg, 40 mg, Oral, DAILY, Tee Castellanos MD, 40 mg at 12/03/18 2701 Recent Results (from the past 12 hour(s)) PROTHROMBIN TIME + INR Collection Time: 12/03/18  5:09 AM  
Result Value Ref Range Prothrombin time 17.3 (H) 11.5 - 14.5 sec INR 1.4 METABOLIC PANEL, BASIC Collection Time: 12/03/18  5:09 AM  
Result Value Ref Range Sodium 144 136 - 145 mmol/L Potassium 4.4 3.5 - 5.1 mmol/L Chloride 112 (H) 98 - 107 mmol/L  
 CO2 26 21 - 32 mmol/L Anion gap 6 (L) 7 - 16 mmol/L Glucose 93 65 - 100 mg/dL BUN 20 8 - 23 MG/DL Creatinine 0.97 0.6 - 1.0 MG/DL  
 GFR est AA >60 >60 ml/min/1.73m2 GFR est non-AA >60 >60 ml/min/1.73m2  Calcium 8.6 8.3 - 10.4 MG/DL  
 CBC W/O DIFF Collection Time: 12/03/18  5:09 AM  
Result Value Ref Range WBC 11.6 (H) 4.3 - 11.1 K/uL  
 RBC 4.53 4.05 - 5.2 M/uL  
 HGB 13.9 11.7 - 15.4 g/dL HCT 42.6 35.8 - 46.3 % MCV 94.0 79.6 - 97.8 FL  
 MCH 30.7 26.1 - 32.9 PG  
 MCHC 32.6 31.4 - 35.0 g/dL  
 RDW 13.4 11.9 - 14.6 % PLATELET 868 855 - 382 K/uL MPV 10.0 9.4 - 12.3 FL ABSOLUTE NRBC 0.00 0.0 - 0.2 K/uL PLEASE READ & DOCUMENT PPD TEST IN 48 HRS Collection Time: 12/03/18 10:00 AM  
Result Value Ref Range PPD negative Negative  
 mm Induration 0 mm CT Results (most recent): 
Results from Hospital Encounter encounter on 12/01/18 CT HEAD WO CONT Narrative EXAM:  Noncontrast CT head. DATE:  December 1, 2018. INDICATION:  Right-sided facial droop and right leg weakness. COMPARISON: Prior CT brain on September 5, 2007. TECHNIQUE: Axial noncontrast CT images of the head were obtained. Radiation dose reduction techniques were used for this study. Our CT scanners 
use one or all of the following: Automated exposure control, adjustment of the 
mA and/or kV according to patient size, iterative reconstruction. FINDINGS:  The brain, ventricles and posterior fossa structures are within 
normal limits. No acute infarct, hemorrhage or mass is identified. There is no 
mass effect, midline shift or evidence of hydrocephalus. No fracture is seen in 
the skull or skull base. The mastoids are clear. Mild mucosal thickening is 
noted in the right maxillary sinus. Impression IMPRESSION:  No acute intracranial process. MRI Results (most recent): 
Results from Hospital Encounter encounter on 12/01/18 MRA NECK W WO CONT Narrative MRA of the neck, 12/2/2018. INDICATION: Dizziness and right-sided weakness. COMPARISON: Carotid ultrasound, 12/1/2018 FINDINGS: Coronally acquired gadolinium-enhanced images were performed of the 
neck. Multiplanar reformatting and source images were reviewed. There is a moderate to large amount of motion artifact within the thoracic 
portion of the study. There is a bovine origin to the arch. No gross stenoses 
are seen. The common internal and external carotid arteries appear to be within 
normal limits. No evidence of significant stenosis or aneurysm is seen. The 
vertebral arteries are patent bilaterally. Impression IMPRESSION: No evidence of significant carotid artery stenosis. Results for orders placed or performed during the hospital encounter of 18  
2D ECHO LIMTED ADULT W OR WO CONTR Narrative [address] [place], [address] [facility] 
[phone] Transthoracic Echocardiogram 
2D, M-mode, Doppler, and Color Doppler Patient: Mejia Correia 
MR #: [id number] : [id number] Age: 58 years Gender: Female Study date: 01-Dec-2018 Account #: [de-identified] Height: 61 in 
Weight: 226.6 lb 
BSA: 1.99 mï¾² Status:Routine Location: 724 BP: 124/ 94 Allergies: CLINDAMYCIN, IODINATED CONTRAST- ORAL AND IV DYE, TRAMADOL HCL, 
TRAMADOL, ALOE VERA, CODEINE, HEPARIN ANALOGUES, LANOLIN, OTHER MEDICATION, MOXIFLOXACIN, ALOE, ASPARTAME, CEFPROZIL, FENTANYL, HEPARIN, MORPHINE, OXYCODONE HCL, POTASSIUM CLAVULANATE, PROPOXYPHENE NAPSYLATE, SULFA 
(SULFONAMIDE ANTIBIOTICS), VARICELLA VIRUS VACC LIVE (PF) Sonographer:  Paolo Jerome [facility] Group:  [facility] Cardiology Referring Physician:  Jacinta Warren MD 
Reading Physician:  Lucrecia Gimenez. Doreen Cardoso MD 
 
INDICATIONS: Acute CVA PROCEDURE: This was a routine study. A transthoracic echocardiogram was 
performed. The study included complete 2D imaging, M-mode, complete spectral 
Doppler, and color Doppler. Intravenous contrast (agitated saline, 9 ml) was 
administered to evaluate possible R-L intracardiac shunting. Image quality  
was 
adequate. LEFT VENTRICLE: Size was normal. Systolic function was normal. Ejection fraction was estimated in the range of 60 % to 65 %. There were no regional 
wall motion abnormalities. Wall thickness was normal. Left ventricular 
diastolic function parameters were normal. Average E/e'~ 8.5. RIGHT VENTRICLE: The size was normal. Systolic function was normal. The 
tricuspid jet envelope definition was inadequate for estimation of RV  
systolic 
pressure. LEFT ATRIUM: Size was normal. 
 
ATRIAL SEPTUM: There was no left-to-right shunt and no right-to-left shunt. RIGHT ATRIUM: Size was normal. 
 
SYSTEMIC VEINS: IVC: The inferior vena cava was normal in size and course. AORTIC VALVE: The valve was probably trileaflet. There was no evidence for 
stenosis. There was no insufficiency. MITRAL VALVE: Valve structure was normal. There was no evidence for stenosis. There was no regurgitation. TRICUSPID VALVE: The valve structure was normal. There was no evidence for 
stenosis. There was trivial regurgitation. PULMONIC VALVE: Not well visualized. There was no evidence for stenosis. There 
was no insufficiency. PERICARDIUM: There was no pericardial effusion. AORTA: The root exhibited normal size. SUMMARY: 
 
-  Left ventricle: Systolic function was normal. Ejection fraction was 
estimated in the range of 60 % to 65 %. There were no regional wall motion 
abnormalities. -  Atrial septum: There was no left-to-right shunt and no right-to-left  
shunt. SYSTEM MEASUREMENT TABLES 
 
2D mode AoR Diam (2D): 2.5 cm 
LA Dimension (2D): 2.9 cm Left Atrium Systolic Volume Index; Method of Disks, Biplane; 2D mode;: 19 ml/m2 IVS/LVPW (2D): 0.9 IVSd (2D): 0.9 cm LVIDd (2D): 3.4 cm LVIDs (2D): 1.9 cm 
LVOT Area (2D): 3.1 cm2 LVPWd (2D): 1 cm RVIDd (2D): 2.5 cm Tissue Doppler Imaging LV Peak Early Dove Tissue Felice; Mean; Lateral MA (TDI): 9.6 cm/s LV Peak Early Dove Tissue Felice; Medial MA (TDI): 6.6 cm/s Unspecified Scan Mode Peak Grad; Mean; Antegrade Flow: 12 mm[Hg] Vmax; Antegrade Flow: 176 cm/s LVOT Diam: 2 cm 
MV Peak Felice/LV Peak Tissue Felice E-Wave; Lateral MA: 7.3 MV Peak Felice/LV Peak Tissue Felice E-Wave; Medial MA: 10.5 Prepared and signed by 
 
Dereje Sebastian MD 
Signed 01-Dec-2018 16:24:46 Physical Exam: 
General - Well developed, well nourished, in no apparent distress. Pleasant and conversent. HEENT - Normocephalic, atraumatic. Conjunctiva are clear. Neck - Supple without masses Extremities - Peripheral pulses intact. No edema and no rashes. Neurological examination - Comprehension, attention, memory and reasoning appear intact. Language and speech are normal. On cranial nerve examination pupils are equal round and reactive to light. Visual fields are full to finger confrontation. Left horizontal gaze palsy. Face is symmetric and sensation is intact to light touch. Hearing is intact. Motor examination - There is normal muscle tone and bulk. Strength is 5/5 in BUE, LLE, 4/5 in RLE. Muscle stretch reflexes are normoactive and there are no pathological reflexes present. Sensation is intact to light touch in all extremities. Cerebellar examination is normal. Gait not assessed. Signed By: Leighton Allen NP December 3, 2018 I saw and examined the patient today with our nurse practitioner and I agree with her assessment and plan with the following inquired at length. In terms of the documentation of her ischemic optic neuritis to ensure this was not a demyelinating variant such as Devic's disease

## 2018-12-03 NOTE — PROGRESS NOTES
Warfarin dosing per pharmacist 
 
America Thomas is a 58 y.o. female. Height: 5' 1\" (154.9 cm)    Weight: 103 kg (227 lb) Indication:  History of DVT after knee surgery, acute CVA Goal INR:  2-3 Home dose:  6 mg qhs 
 
Risk factors or significant drug interactions:  doxycycline Other anticoagulants:  none Daily Monitoring Date  INR     Warfarin dose HGB              Notes 12/1  1.5  6 mg  14.7 
12/2  ---  6 mg  14.2 
12/3  1.4  9 mg  13.9 Pharmacy consulted to manage warfarin for Ms. Joan Saba during this admission. She presented with subtherapeutic INR after holding warfarin for recent rhinoplasty reversal surgery. INR has fallen to 1.4 after two doses. Will increase warfarin dose to 9 mg tonight with consideration to resume home dosing regimen tomorrow. Daily INR. Pharmacy will continue to follow. Please call with any questions. Thank you, Jason Becerril, PharmD Clinical Pharmacist 
[phone]

## 2018-12-03 NOTE — PROGRESS NOTES
Met with pt at bedside, pt is alert and oriented x3,  lives in 2 story home and grown son lives with her states he works all day in his office and goes to college 2 days a week.  has 4 steps to enter home and 17 steps inside to reach bedroom area,  has a rollator walker in home that she has been using to ambulate, states she is independent with ADLS, and drives. Plan is to DC home with son, agreeable to home health or outpt therapy if needed. CM will follow up with pt after pt seen by PT/OT today. Care Management Interventions PCP Verified by CM: Yes Transition of Care Consult (CM Consult): Discharge Planning Current Support Network: Own Home Confirm Follow Up Transport: Family Plan discussed with Pt/Family/Caregiver: Yes Freedom of Choice Offered: Yes Discharge Location Discharge Placement: Home

## 2018-12-03 NOTE — PROGRESS NOTES
Spiritual Care Visit, initial visit. Visited with patient at bedside. Prayed for patient's healing and health. Visit by Maxwell Abraham, Staff .  Rehana., Rahel.B., B.A.

## 2018-12-03 NOTE — PROGRESS NOTES
Ischemic Stroke without Activase/TIA 
 
VTE Prophylaxis: Yes: coumadin Antiplatelet: Yes: coumadin Statin if LDL Greater Than or Equal to100: Yes: lipitor BP Parameters: Less Than 220/120 for 24 hours, then consult MD for parameters Controlled With: Scheduled PO Dysphagia Screen Completed: Yes: Pass Dysphagia Screening Vocal Quality/Secretions: Normal 
History of Dysphagia: No 
O2 Saturation: Normal 
Alertness: Normal 
Pre-Swallow Assessment Score: 0 Purees: No difficulty noted Water by Cup: No difficulty noted Water by Straw: No difficulty noted Patient has PEG, NG Tube, Feeding Tube: No 
 
Medication orders per above route: Yes 
 
Nutrition Status: PO 
 
NIH Stroke Scale Complete: Yes: 5 Frequency of Vital Signs: Every 4 hours Frequency of Neuro Checks: Every 4 hours Daily Education/Care Plan Updated: Yes Marlyn Raza RN

## 2018-12-03 NOTE — PROGRESS NOTES
Hospitalist Progress Note Admit Date:  2018  4:08 AM  
Name:  aMritza Medina Age:  58 y.o. 
:  1955 MRN:  [id number] PCP:  Ramesh Null MD 
Treatment Team: Attending Provider: Lilia Cruz MD; Consulting Provider: Tracee Davies MD; Charge Nurse: Ramone Aguirre; Consulting Provider: Governor Kennedy MD; Care Manager: Brandon Norman RN Subjective:  
 
Ms. Ivan Enamorado is a 59 yo female with PMH of CVA, HTN, DVT on chronic coumadin, rhinoplasty  admitted with dizziness and right sided weakness/ facial droop. CT head negative, MRI brain/ MRA head and MRA neck negative. ECHO/carotid duplex no acute issues. She has subtherapeutic INR/ was on lovenox bridge during perioperative period. Neurology following. She has evidence of UTI, no cx and on oral doxycycline postop rhinoplasty. Plans are for home at discharge 12-3-18 worked with PT and valium was helpful for dizziness, requesting plastic consult for suture removal, no BM, ate ok, has right UE shakes and lack of finger coordination , no dyspnea or cough Objective:  
 
Patient Vitals for the past 24 hrs: 
 Temp Pulse Resp BP SpO2  
18 1200 98.5 °F (36.9 °C) 75 18 150/81 90 % 18 0800 97.7 °F (36.5 °C) 77 18 149/85 93 % 18 0404 98.2 °F (36.8 °C) 66 20 129/83 97 % 18 0004 97.5 °F (36.4 °C) 80 20 129/79 96 % 18 2002 98.1 °F (36.7 °C) 81 20 132/84 95 % 18 1547 97.8 °F (36.6 °C) 75 17 146/82 97 % Oxygen Therapy O2 Sat (%): 90 % (18 1200) Pulse via Oximetry: 85 beats per minute (18 0901) O2 Device: Room air (18 1600) No intake or output data in the 24 hours ending 18 1533 *Note that automatically entered I/Os may not be accurate; dependent on patient compliance with collection and accurate  by assistants. General:    Well nourished. Alert. No distress CV:   RRR. No murmur, rub, or gallop. Lungs:   CTAB. No wheezing, rhonchi, or rales. Abdomen:   Soft, nontender, nondistended. Extremities: Warm and dry. No cyanosis or edema. Skin:     No rashes or jaundice. Neuro:  No gross focal deficits, 5/5 UE strength Data Review: 
I have reviewed all labs, meds, telemetry events, and studies from the last 24 hours: 
 
Recent Results (from the past 24 hour(s)) PROTHROMBIN TIME + INR Collection Time: 18  5:09 AM  
Result Value Ref Range Prothrombin time 17.3 (H) 11.5 - 14.5 sec INR 1.4 METABOLIC PANEL, BASIC Collection Time: 18  5:09 AM  
Result Value Ref Range Sodium 144 136 - 145 mmol/L Potassium 4.4 3.5 - 5.1 mmol/L Chloride 112 (H) 98 - 107 mmol/L  
 CO2 26 21 - 32 mmol/L Anion gap 6 (L) 7 - 16 mmol/L Glucose 93 65 - 100 mg/dL BUN 20 8 - 23 MG/DL Creatinine 0.97 0.6 - 1.0 MG/DL  
 GFR est AA >60 >60 ml/min/1.73m2 GFR est non-AA >60 >60 ml/min/1.73m2 Calcium 8.6 8.3 - 10.4 MG/DL  
CBC W/O DIFF Collection Time: 18  5:09 AM  
Result Value Ref Range WBC 11.6 (H) 4.3 - 11.1 K/uL  
 RBC 4.53 4.05 - 5.2 M/uL  
 HGB 13.9 11.7 - 15.4 g/dL HCT 42.6 35.8 - 46.3 % MCV 94.0 79.6 - 97.8 FL  
 MCH 30.7 26.1 - 32.9 PG  
 MCHC 32.6 31.4 - 35.0 g/dL  
 RDW 13.4 11.9 - 14.6 % PLATELET 375 358 - 250 K/uL MPV 10.0 9.4 - 12.3 FL ABSOLUTE NRBC 0.00 0.0 - 0.2 K/uL PLEASE READ & DOCUMENT PPD TEST IN 48 HRS Collection Time: 18 10:00 AM  
Result Value Ref Range PPD negative Negative  
 mm Induration 0 mm All Micro Results None Results for orders placed or performed during the hospital encounter of 18  
2D ECHO LIMTED ADULT W OR WO CONTR Narrative [place] [facility] [address] [place], [address] [facility] 
[phone] Transthoracic Echocardiogram 
2D, M-mode, Doppler, and Color Doppler Patient: Eyad Salgado 
MR #: [id number] : [id number] Age: 58 years Gender: Female Study date: 01-Dec-2018 Account #: [de-identified] Height: 61 in 
Weight: 226.6 lb 
BSA: 1.99 mï¾² Status:Routine Location: 724 BP: 124/ 94 Allergies: CLINDAMYCIN, IODINATED CONTRAST- ORAL AND IV DYE, TRAMADOL HCL, 
TRAMADOL, ALOE VERA, CODEINE, HEPARIN ANALOGUES, LANOLIN, OTHER MEDICATION, MOXIFLOXACIN, ALOE, ASPARTAME, CEFPROZIL, FENTANYL, HEPARIN, MORPHINE, OXYCODONE HCL, POTASSIUM CLAVULANATE, PROPOXYPHENE NAPSYLATE, SULFA 
(SULFONAMIDE ANTIBIOTICS), VARICELLA VIRUS VACC LIVE (PF) Sonographer:  Jagdish Nichols [facility] Group:  [address] Cardiology Referring Physician:  John Huang MD 
Reading Physician:  Anant Hooper. Chai Arias MD 
 
INDICATIONS: Acute CVA PROCEDURE: This was a routine study. A transthoracic echocardiogram was 
performed. The study included complete 2D imaging, M-mode, complete spectral 
Doppler, and color Doppler. Intravenous contrast (agitated saline, 9 ml) was 
administered to evaluate possible R-L intracardiac shunting. Image quality  
was 
adequate. LEFT VENTRICLE: Size was normal. Systolic function was normal. Ejection 
fraction was estimated in the range of 60 % to 65 %. There were no regional 
wall motion abnormalities. Wall thickness was normal. Left ventricular 
diastolic function parameters were normal. Average E/e'~ 8.5. RIGHT VENTRICLE: The size was normal. Systolic function was normal. The 
tricuspid jet envelope definition was inadequate for estimation of RV  
systolic 
pressure. LEFT ATRIUM: Size was normal. 
 
ATRIAL SEPTUM: There was no left-to-right shunt and no right-to-left shunt. RIGHT ATRIUM: Size was normal. 
 
SYSTEMIC VEINS: IVC: The inferior vena cava was normal in size and course. AORTIC VALVE: The valve was probably trileaflet. There was no evidence for 
stenosis. There was no insufficiency. MITRAL VALVE: Valve structure was normal. There was no evidence for stenosis. There was no regurgitation. TRICUSPID VALVE: The valve structure was normal. There was no evidence for 
stenosis. There was trivial regurgitation. PULMONIC VALVE: Not well visualized. There was no evidence for stenosis. There 
was no insufficiency. PERICARDIUM: There was no pericardial effusion. AORTA: The root exhibited normal size. SUMMARY: 
 
-  Left ventricle: Systolic function was normal. Ejection fraction was 
estimated in the range of 60 % to 65 %. There were no regional wall motion 
abnormalities. -  Atrial septum: There was no left-to-right shunt and no right-to-left  
shunt. SYSTEM MEASUREMENT TABLES 
 
2D mode AoR Diam (2D): 2.5 cm 
LA Dimension (2D): 2.9 cm Left Atrium Systolic Volume Index; Method of Disks, Biplane; 2D mode;: 19 ml/m2 IVS/LVPW (2D): 0.9 IVSd (2D): 0.9 cm LVIDd (2D): 3.4 cm LVIDs (2D): 1.9 cm 
LVOT Area (2D): 3.1 cm2 LVPWd (2D): 1 cm RVIDd (2D): 2.5 cm Tissue Doppler Imaging LV Peak Early Dove Tissue Felice; Mean; Lateral MA (TDI): 9.6 cm/s LV Peak Early Dove Tissue Felice; Medial MA (TDI): 6.6 cm/s Unspecified Scan Mode Peak Grad; Mean; Antegrade Flow: 12 mm[Hg] Vmax; Antegrade Flow: 176 cm/s LVOT Diam: 2 cm 
MV Peak Felice/LV Peak Tissue Felice E-Wave; Lateral MA: 7.3 MV Peak Felice/LV Peak Tissue Felice E-Wave; Medial MA: 10.5 Prepared and signed by 
 
Lambert Temple MD 
Signed 01-Dec-2018 16:24:46 Current Meds: 
Current Facility-Administered Medications Medication Dose Route Frequency  warfarin (COUMADIN) tablet 9 mg  9 mg Oral QPM  
 pantoprazole (PROTONIX) tablet 40 mg  40 mg Oral ACB&D  
 bisacodyl (DULCOLAX) tablet 5 mg  5 mg Oral BID PRN  
 sodium chloride (OCEAN) 0.65 % nasal squeeze bottle 2 Spray  2 Spray Both Nostrils Q2H PRN  
 diazePAM (VALIUM) tablet 5 mg  5 mg Oral Q6H PRN  
 allopurinol (ZYLOPRIM) tablet 300 mg  300 mg Oral QHS  doxycycline (VIBRAMYCIN) capsule 100 mg  100 mg Oral BID  DULoxetine (CYMBALTA) capsule 60 mg  60 mg Oral DAILY  lisinopril (PRINIVIL, ZESTRIL) tablet 20 mg  20 mg Oral QPM  
 pregabalin (LYRICA) capsule 50 mg  50 mg Oral BID  
 oxyCODONE-acetaminophen (PERCOCET) 5-325 mg per tablet 1 Tab  1 Tab Oral Q4H PRN  
 sodium chloride (NS) flush 5-10 mL  5-10 mL IntraVENous Q8H  
 sodium chloride (NS) flush 5-10 mL  5-10 mL IntraVENous PRN  
 0.9% sodium chloride infusion  75 mL/hr IntraVENous CONTINUOUS  
 acetaminophen (TYLENOL) tablet 650 mg  650 mg Oral Q4H PRN  
 ondansetron (ZOFRAN) injection 4 mg  4 mg IntraVENous Q4H PRN  
 aspirin delayed-release tablet 325 mg  325 mg Oral DAILY  atorvastatin (LIPITOR) tablet 40 mg  40 mg Oral DAILY Other Studies (last 24 hours): 
Mra Neck W Wo Cont Result Date: 12/2/2018 MRA of the neck, 12/2/2018. INDICATION: Dizziness and right-sided weakness. COMPARISON: Carotid ultrasound, 12/1/2018 FINDINGS: Coronally acquired gadolinium-enhanced images were performed of the neck. Multiplanar reformatting and source images were reviewed. There is a moderate to large amount of motion artifact within the thoracic portion of the study. There is a bovine origin to the arch. No gross stenoses are seen. The common internal and external carotid arteries appear to be within normal limits. No evidence of significant stenosis or aneurysm is seen. The vertebral arteries are patent bilaterally. IMPRESSION: No evidence of significant carotid artery stenosis. Assessment and Plan:  
 
Hospital Problems as of 12/3/2018 Date Reviewed: 11/27/2018 Codes Class Noted - Resolved POA * (Principal) Acute CV[facility] ([facility]brovascular accident) ([facility] ICD-10-CM: I63.9 ICD-9-CM: 434.91  12/1/2018 - Present Yes Plan: · Vertigo: trial of PT/OT [facility] · CVA: remote, continue asa, lipitor · Chronic DVT: continue coumadin, pharmD consult for dosing, daily INR checks · S/p rhinoplasty: will consult Dr. Lily Riley for wound care followup while here , continue postop doxycycline · UTI: recheck UA and cx 
· HTN: lisinopril DC planning/Dispo:  Pending Diet:  DIET REGULAR 
DVT ppx:  INR subtherapeutic, Signed: Karina Childers MD

## 2018-12-03 NOTE — ROUTINE PROCESS
Ischemic Stroke without Activase/TIA   
VTE Prophylaxis: Yes: Coumadin  
  
Antiplatelet: Yes: Coumden 
  
Statin if LDL Greater Than or Equal to100: Yes: lipitor 
  
BP Parameters: Less Than 220/120 for 24 hours, then consult MD for parameters 
  
Controlled With: Scheduled PO 
  
Dysphagia Screen Completed: Yes: Pass Dysphagia Screening Vocal Quality/Secretions: Normal 
History of Dysphagia: No 
O2 Saturation: Normal 
Alertness: Normal 
Pre-Swallow Assessment Score: 0 Purees: No difficulty noted Water by Cup: No difficulty noted Water by Straw: No difficulty noted 
  
Patient has PEG, NG Tube, Feeding Tube: No 
  
Medication orders per above route: Yes 
  
Nutrition Status: PO 
  
NIH Stroke Scale Complete: Yes: 5 
  
Frequency of Vital Signs: Every 4 hours Frequency of Neuro Checks: Every 4 hours 
  
Daily Education/Care Plan Updated:  Yes

## 2018-12-04 ENCOUNTER — HOME HEALTH ADMISSION (OUTPATIENT)
Dept: HOME HEALTH SERVICES | Facility: HOME HEALTH | Age: 63
End: 2018-12-04
Payer: MEDICARE

## 2018-12-04 LAB
INR PPP: 1.7
MM INDURATION POC: 0 MM (ref 0–5)
PPD POC: NEGATIVE NEGATIVE
PROTHROMBIN TIME: 19.3 SEC (ref 11.7–14.5)

## 2018-12-04 PROCEDURE — 97530 THERAPEUTIC ACTIVITIES: CPT

## 2018-12-04 PROCEDURE — 74011250637 HC RX REV CODE- 250/637: Performed by: INTERNAL MEDICINE

## 2018-12-04 PROCEDURE — 97535 SELF CARE MNGMENT TRAINING: CPT

## 2018-12-04 PROCEDURE — G8980 MOBILITY D/C STATUS: HCPCS

## 2018-12-04 PROCEDURE — 85610 PROTHROMBIN TIME: CPT

## 2018-12-04 PROCEDURE — 99218 HC RM OBSERVATION: CPT

## 2018-12-04 PROCEDURE — 36415 COLL VENOUS BLD VENIPUNCTURE: CPT

## 2018-12-04 PROCEDURE — 97164 PT RE-EVAL EST PLAN CARE: CPT

## 2018-12-04 PROCEDURE — 74011250637 HC RX REV CODE- 250/637: Performed by: PSYCHIATRY & NEUROLOGY

## 2018-12-04 PROCEDURE — 97110 THERAPEUTIC EXERCISES: CPT

## 2018-12-04 PROCEDURE — 99232 SBSQ HOSP IP/OBS MODERATE 35: CPT | Performed by: NURSE PRACTITIONER

## 2018-12-04 PROCEDURE — 77030020255 HC SOL INJ LR 1000ML BG

## 2018-12-04 PROCEDURE — 65390000012 HC CONDITION CODE 44 OBSERVATION

## 2018-12-04 PROCEDURE — G8979 MOBILITY GOAL STATUS: HCPCS

## 2018-12-04 RX ADMIN — ATORVASTATIN CALCIUM 40 MG: 40 TABLET, FILM COATED ORAL at 08:48

## 2018-12-04 RX ADMIN — DOXYCYCLINE HYCLATE 100 MG: 100 CAPSULE ORAL at 08:49

## 2018-12-04 RX ADMIN — DIAZEPAM 5 MG: 5 TABLET ORAL at 21:30

## 2018-12-04 RX ADMIN — LISINOPRIL 20 MG: 20 TABLET ORAL at 17:27

## 2018-12-04 RX ADMIN — Medication 5 ML: at 21:29

## 2018-12-04 RX ADMIN — DIAZEPAM 5 MG: 5 TABLET ORAL at 09:45

## 2018-12-04 RX ADMIN — WARFARIN SODIUM 9 MG: 5 TABLET ORAL at 17:26

## 2018-12-04 RX ADMIN — ASPIRIN 325 MG: 325 TABLET, DELAYED RELEASE ORAL at 08:49

## 2018-12-04 RX ADMIN — DULOXETINE 60 MG: 60 CAPSULE, DELAYED RELEASE ORAL at 08:49

## 2018-12-04 RX ADMIN — PREGABALIN 50 MG: 50 CAPSULE ORAL at 17:26

## 2018-12-04 RX ADMIN — ACETAMINOPHEN 650 MG: 325 TABLET, FILM COATED ORAL at 14:22

## 2018-12-04 RX ADMIN — PANTOPRAZOLE SODIUM 40 MG: 40 TABLET, DELAYED RELEASE ORAL at 17:26

## 2018-12-04 RX ADMIN — Medication 5 ML: at 06:00

## 2018-12-04 RX ADMIN — PANTOPRAZOLE SODIUM 40 MG: 40 TABLET, DELAYED RELEASE ORAL at 05:23

## 2018-12-04 RX ADMIN — DOXYCYCLINE HYCLATE 100 MG: 100 CAPSULE ORAL at 17:26

## 2018-12-04 RX ADMIN — Medication 5 ML: at 14:23

## 2018-12-04 RX ADMIN — ALLOPURINOL 300 MG: 300 TABLET ORAL at 21:29

## 2018-12-04 RX ADMIN — PREGABALIN 50 MG: 50 CAPSULE ORAL at 08:49

## 2018-12-04 NOTE — PROGRESS NOTES
Pt agreeable to home with [facility] for PT/OT with vestibular treatments, orders and referral completed.

## 2018-12-04 NOTE — PROGRESS NOTES
Problem: Falls - Risk of 
Goal: *Absence of Falls Document Steve Corona Fall Risk and appropriate interventions in the flowsheet. Outcome: Progressing Towards Goal 
Fall Risk Interventions: 
Mobility Interventions: Bed/chair exit alarm, Communicate number of staff needed for ambulation/transfer, OT consult for ADLs, Patient to call before getting OOB, PT Consult for mobility concerns, PT Consult for assist device competence Medication Interventions: Bed/chair exit alarm, Evaluate medications/consider consulting pharmacy, Patient to call before getting OOB, Teach patient to arise slowly Elimination Interventions: Bed/chair exit alarm, Call light in reach, Elevated toilet seat, Patient to call for help with toileting needs, Toilet paper/wipes in reach, Toileting schedule/hourly rounds History of Falls Interventions: Bed/chair exit alarm, Consult care management for discharge planning, Evaluate medications/consider consulting pharmacy

## 2018-12-04 NOTE — PHYSICIAN ADVISORY
Letter of Determination:  Outpatient status receiving Observation Services This patient was originally hospitalized as Inpatient Status on 12/1/2018 for evaluation for cerebral vascular accident. At this time this patient does not appear to meet the medical necessity requirements  to support an inpatient level of care. It is our recommendation that this patient's hospitalization status should be changed from INPATIENT to [place] receiving OBSERVATION services via Condition Code 44.  
  
This may change due to the medical condition of the patient and new clinical evidence as the patients care progreses. The final decision regarding the patient's hospitalization status depends on the attending physician's judgement. Domitila Segundo MD, CHARO, Physician Advisor [id number].

## 2018-12-04 NOTE — PROGRESS NOTES
Hospitalist Progress Note Admit Date:  2018  4:08 AM  
Name:  Ramona Longoria Age:  58 y.o. 
:  1955 MRN:  [id number] PCP:  Rock Fernando MD 
Treatment Team: Attending Provider: Kelvin Tatum MD; Consulting Provider: Lolly Small MD; Consulting Provider: Zay Vyas MD; Care Manager: Phillip Teran RN; Charge Nurse: Tr Baumann; Utilization Review: Nyle Meigs, RN Subjective:  
 
Ms. Lorrie Hernadez is a 59 yo female with PMH of CVA, HTN, DVT on chronic coumadin, rhinoplasty  admitted with dizziness and right sided weakness/ facial droop. CT head negative, MRI brain/ MRA head and MRA neck negative. ECHO/carotid duplex no acute issues. She has subtherapeutic INR/ was on lovenox bridge during perioperative period. Neurology following and feeling that she has acute vertigo episode. She has worked with PT and will need vestibular rehab. She has evidence of UTI,  cx skin carley,  and on oral doxycycline postop rhinoplasty. Dr. Ji Medina came and removed sutures during this admit. Plans are for home at discharge 18 still with some nausea and dizziness, feels unsafe with some ambulation, ate ok Objective:  
 
Patient Vitals for the past 24 hrs: 
 Temp Pulse Resp BP SpO2  
18 1200 98 °F (36.7 °C) 64 20 153/83 99 % 18 0800 97.8 °F (36.6 °C) 75 20 (!) 162/92 95 % 18 0404 97.5 °F (36.4 °C) 75 20 136/84 95 % 18 0004 97.6 °F (36.4 °C) 75 20 144/84 96 % 18 2004 98.6 °F (37 °C) 74 20 135/81 95 % 18 1600 97.3 °F (36.3 °C) 79 18 148/87 93 % Oxygen Therapy O2 Sat (%): 99 % (18 1200) Pulse via Oximetry: 85 beats per minute (18 0901) O2 Device: Room air (18 1600) No intake or output data in the 24 hours ending 18 1524 *Note that automatically entered I/Os may not be accurate; dependent on patient compliance with collection and accurate  by assistants. General:    Well nourished. Alert. No distress CV:   RRR. No murmur, rub, or gallop. No edema Lungs:   CTAB. No wheezing, rhonchi, or rales. Anterior exam  
Abdomen:   Soft, nontender, nondistended. Decreased BS Extremities: Warm and dry. Skin:     No rashes or jaundice. Neuro:  No gross focal deficits Data Review: 
I have reviewed all labs, meds, telemetry events, and studies from the last 24 hours: 
 
Recent Results (from the past 24 hour(s)) URINALYSIS W/ RFLX MICROSCOPIC Collection Time: 18  6:44 PM  
Result Value Ref Range Color YELLOW Appearance CLEAR Specific gravity 1.017 1.001 - 1.023    
 pH (UA) 5.5 5.0 - 9.0 Protein NEGATIVE  NEG mg/dL Glucose NEGATIVE  mg/dL Ketone NEGATIVE  NEG mg/dL Bilirubin NEGATIVE  NEG Blood NEGATIVE  NEG Urobilinogen 0.2 0.2 - 1.0 EU/dL Nitrites NEGATIVE  NEG Leukocyte Esterase SMALL (A) NEG    
 WBC 3-5 0 /hpf  
 RBC 0 0 /hpf Epithelial cells 0-3 0 /hpf Bacteria TRACE 0 /hpf Casts 0 0 /lpf PROTHROMBIN TIME + INR Collection Time: 18  5:14 AM  
Result Value Ref Range Prothrombin time 19.3 (H) 11.7 - 14.5 sec INR 1.7 All Micro Results Procedure Component Value Units Date/Time Keith Billing [[id number]] Collected:  18 1545 Order Status:  Completed Specimen:  Urine from Clean catch Updated:  18 6163 Special Requests: NO SPECIAL REQUESTS Culture result:    
  10,000 to 50,000 COLONIES/mL MIXED SKIN SARA ISOLATED Results for orders placed or performed during the hospital encounter of 18  
2D ECHO LIMTED ADULT W OR WO CONTR Narrative [place] [facility] [address] [place], [address] [facility] 
[phone] Transthoracic Echocardiogram 
2D, M-mode, Doppler, and Color Doppler Patient: Kiesha Escudero 
MR #: [id number] : [id number] Age: 58 years Gender: Female Study date: 01-Dec-2018 Account #: [de-identified] Height: 61 in 
Weight: 226.6 lb 
BSA: 1.99 mï¾² Status:Routine Location: 724 BP: 124/ 94 Allergies: CLINDAMYCIN, IODINATED CONTRAST- ORAL AND IV DYE, TRAMADOL HCL, 
TRAMADOL, ALOE VERA, CODEINE, HEPARIN ANALOGUES, LANOLIN, OTHER MEDICATION, MOXIFLOXACIN, ALOE, ASPARTAME, CEFPROZIL, FENTANYL, HEPARIN, MORPHINE, OXYCODONE HCL, POTASSIUM CLAVULANATE, PROPOXYPHENE NAPSYLATE, SULFA 
(SULFONAMIDE ANTIBIOTICS), VARICELLA VIRUS VACC LIVE (PF) Sonographer:  Chris Shields [facility] Group:  [facility] Referring Physician:  Rafaela Odell MD 
Reading Physician:  Jennifer Saavedra MD 
 
INDICATIONS: Acute CVA PROCEDURE: This was a routine study. A transthoracic echocardiogram was 
performed. The study included complete 2D imaging, M-mode, complete spectral 
Doppler, and color Doppler. Intravenous contrast (agitated saline, 9 ml) was 
administered to evaluate possible R-L intracardiac shunting. Image quality  
was 
adequate. LEFT VENTRICLE: Size was normal. Systolic function was normal. Ejection 
fraction was estimated in the range of 60 % to 65 %. There were no regional 
wall motion abnormalities. Wall thickness was normal. Left ventricular 
diastolic function parameters were normal. Average E/e'~ 8.5. RIGHT VENTRICLE: The size was normal. Systolic function was normal. The 
tricuspid jet envelope definition was inadequate for estimation of RV  
systolic 
pressure. LEFT ATRIUM: Size was normal. 
 
ATRIAL SEPTUM: There was no left-to-right shunt and no right-to-left shunt. RIGHT ATRIUM: Size was normal. 
 
SYSTEMIC VEINS: IVC: The inferior vena cava was normal in size and course. AORTIC VALVE: The valve was probably trileaflet. There was no evidence for 
stenosis. There was no insufficiency. MITRAL VALVE: Valve structure was normal. There was no evidence for stenosis. There was no regurgitation. TRICUSPID VALVE: The valve structure was normal. There was no evidence for 
stenosis. There was trivial regurgitation. PULMONIC VALVE: Not well visualized. There was no evidence for stenosis. There 
was no insufficiency. PERICARDIUM: There was no pericardial effusion. AORTA: The root exhibited normal size. SUMMARY: 
 
-  Left ventricle: Systolic function was normal. Ejection fraction was 
estimated in the range of 60 % to 65 %. There were no regional wall motion 
abnormalities. -  Atrial septum: There was no left-to-right shunt and no right-to-left  
shunt. SYSTEM MEASUREMENT TABLES 
 
2D mode AoR Diam (2D): 2.5 cm 
LA Dimension (2D): 2.9 cm Left Atrium Systolic Volume Index; Method of Disks, Biplane; 2D mode;: 19 ml/m2 IVS/LVPW (2D): 0.9 IVSd (2D): 0.9 cm LVIDd (2D): 3.4 cm LVIDs (2D): 1.9 cm 
LVOT Area (2D): 3.1 cm2 LVPWd (2D): 1 cm RVIDd (2D): 2.5 cm Tissue Doppler Imaging LV Peak Early Dove Tissue Felice; Mean; Lateral MA (TDI): 9.6 cm/s LV Peak Early Dove Tissue Felice; Medial MA (TDI): 6.6 cm/s Unspecified Scan Mode Peak Grad; Mean; Antegrade Flow: 12 mm[Hg] Vmax; Antegrade Flow: 176 cm/s LVOT Diam: 2 cm 
MV Peak Felice/LV Peak Tissue Felice E-Wave; Lateral MA: 7.3 MV Peak Felice/LV Peak Tissue Felice E-Wave; Medial MA: 10.5 Prepared and signed by 
 
Kamila Calvo MD 
Signed 01-Dec-2018 16:24:46 Current Meds: 
Current Facility-Administered Medications Medication Dose Route Frequency  warfarin (COUMADIN) tablet 9 mg  9 mg Oral QPM  
 pantoprazole (PROTONIX) tablet 40 mg  40 mg Oral ACB&D  
 bisacodyl (DULCOLAX) tablet 5 mg  5 mg Oral BID PRN  
 sodium chloride (OCEAN) 0.65 % nasal squeeze bottle 2 Spray  2 Spray Both Nostrils Q2H PRN  
 diazePAM (VALIUM) tablet 5 mg  5 mg Oral Q6H PRN  
 allopurinol (ZYLOPRIM) tablet 300 mg  300 mg Oral QHS  doxycycline (VIBRAMYCIN) capsule 100 mg  100 mg Oral BID  DULoxetine (CYMBALTA) capsule 60 mg  60 mg Oral DAILY  lisinopril (PRINIVIL, ZESTRIL) tablet 20 mg  20 mg Oral QPM  
 pregabalin (LYRICA) capsule 50 mg  50 mg Oral BID  
 oxyCODONE-acetaminophen (PERCOCET) 5-325 mg per tablet 1 Tab  1 Tab Oral Q4H PRN  
 sodium chloride (NS) flush 5-10 mL  5-10 mL IntraVENous Q8H  
 sodium chloride (NS) flush 5-10 mL  5-10 mL IntraVENous PRN  
 0.9% sodium chloride infusion  75 mL/hr IntraVENous CONTINUOUS  
 acetaminophen (TYLENOL) tablet 650 mg  650 mg Oral Q4H PRN  
 ondansetron (ZOFRAN) injection 4 mg  4 mg IntraVENous Q4H PRN  
 aspirin delayed-release tablet 325 mg  325 mg Oral DAILY  atorvastatin (LIPITOR) tablet 40 mg  40 mg Oral DAILY Other Studies (last 24 hours): No results found. Assessment and Plan:  
 
Hospital Problems as of 12/4/2018 Date Reviewed: 11/27/2018 Codes Class Noted - Resolved POA * (Principal) [facility] ICD-10-CM: I63.9 ICD-9-CM: 434.91  12/1/2018 - Present Yes Plan: · Vertigo: trial of PT/OT /vestibular rehab, she is waiting on specific physical therapist today who will try aleshia Hallpike maneuvers to see if this is helpful for her vertigo, explained that she will likely have some residual vertigo for some time and will need ongoing PT, she states she will not be able to get to outpatient vestibular rehab due to lack of transportation, feels improved with prn valium, possibly discharge home after PT today · CVA: remote, continue asa, lipitor, appreciate neuro input · Chronic DVT: continue coumadin, pharmD consult for dosing, daily INR checks · S/p rhinoplasty:  continue postop doxycycline, sutures removed per plastics · UTI: negative cx 
· HTN: lisinopril DC planning/Dispo:  Pending to home Diet:  DIET REGULAR 
DVT ppx:  INR subtherapeutic, SCD Signed: Rajwinder Felix MD

## 2018-12-04 NOTE — PROGRESS NOTES
Problem: Self Care Deficits Care Plan (Adult) Goal: *Acute Goals and Plan of Care (Insert Text) 1. Patient will complete lower body bathing and dressing with SBA and adaptive equipment as needed. 2. Patient will complete toileting with CGA. 3. Patient will tolerate 25 minutes of OT treatment with 3-4 rest breaks to increase activity tolerance for ADLs. 4. Patient will complete functional transfers with supervision and adaptive equipment as needed. 5. Patient will complete R UE coordination exercises for 15 minutes to improve coordination/speed of dominant UE for daily tasks. 6. Patient will complete functional mobility for household distances with CGA to improve safety and decreased risk for falls. Timeframe: 7 visits OCCUPATIONAL THERAPY: Daily Note, Treatment Day: 1st and AM  
 12/4/2018INPATIENT: Hospital Day: 4 Payor: Shiloh Crisostomo / Plan: [address] [place] [address] HMO / Product Type: [employer] Medicare /  
  
NAME/AGE/GENDER: Ben Restrepo is a 58 y.o. female PRIMARY DIAGNOSIS:  Acute CVA (cerebrovascular accident) ([facility] [I63.9] Acute CVA (cerebrovascular accident) ([facility] Acute CVA (cerebrovascular accident) ([facility] ICD-10: Treatment Diagnosis:  
 · Pain in Right Shoulder (M25.511) · Stiffness of Right Shoulder, Not elsewhere classified (M25.611) · Generalized Muscle Weakness (M62.81) · Other lack of cordination (R27.8) Precautions/Allergies: 
   Clindamycin; Iodinated contrast- oral and iv dye; Tramadol hcl; Ultram [tramadol]; Aloe vera; Codeine; Heparin analogues; Lanolin; Other medication; Vigamox [moxifloxacin]; Aloe; Aspartame; Cefzil [cefprozil]; Fentanyl; Heparin; Morphine; Oxycodone hcl; Potassium clavulanate; Propoxyphene napsylate; Sulfa (sulfonamide antibiotics); and Varicella virus vacc live (pf)  
  
ASSESSMENT:  
Ms. David Cardenas presents to the hospital with dizziness and R sided weakness. 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 12/4/2018 Pt presents in supine upon arrival. Pt agreeable to treatment and transferred to sitting with SBA. Pt completed sit to stand with a rolling walker and SBA and completed functional mobility in the room and in the bathroom with CGA. Pt completed toileting with SBA and grooming/hygiene activities at the sink with SBA/CGA. Pt took a seated rest break due to dizziness and then completed further mobility in the hallway with same level of assist as above with several rest breaks due to dizziness and decreased activity tolerance. Pt returned to room and was positioned up in the chair and completed exercises listed in the following grid. Good effort. Continue POC. This section established at most recent assessment PROBLEM LIST (Impairments causing functional limitations): 1. Decreased Strength 2. Decreased ADL/Functional Activities 3. Decreased Transfer Abilities 4. Decreased Ambulation Ability/Technique 5. Decreased Balance 6. Decreased Activity Tolerance 7. Edema/Girth 8. Decreased [place] with Home Exercise Program 
 INTERVENTIONS PLANNED: (Benefits and precautions of occupational therapy have been discussed with the patient.) 1. Activities of daily living training 2. Adaptive equipment training 3. Balance training 4. Clothing management 5. Donning&doffing training 6. Neuromuscular re-eduation 7. Therapeutic activity 8. Therapeutic exercise TREATMENT PLAN: Frequency/Duration: Follow patient 3 times per week to address above goals. Rehabilitation Potential For Stated Goals: Excellent RECOMMENDED REHABILITATION/EQUIPMENT: (at time of discharge pending progress): Due to the probability of continued deficits (see above) this patient will likely need continued skilled occupational therapy after discharge. Equipment: ? TBD  
    
 
 
 
OCCUPATIONAL PROFILE AND HISTORY:  
History of Present Injury/Illness (Reason for Referral): 
See H&P Past Medical History/Comorbidities: Ms. Linda Brito  has a past medical history of Adverse effect of anesthesia, Anticoagulated on Coumadin, Anxiety, Arthritis, Bleeding from nipple in female, Breast lump, Chronic pain, CKD (chronic kidney disease), Current use of long term anticoagulation, Difficult intubation, GERD (gastroesophageal reflux disease), H/O right knee surgery, History of CVA (cerebrovascular accident), History of DVT of lower extremity, HTN (hypertension), gout, Hyperlipidemia, Insomnia, Left ear hearing loss, Mitral valve prolapse, Morbid obesity ([address] MVP (mitral valve prolapse), Pinched nerve in neck, Post-operative nausea and vomiting, PUD (peptic ulcer disease), Right shoulder injury, Stroke ([address] Thromboembolus ([address] Torn rotator cuff, Unspecified adverse effect of anesthesia, and Vitamin D deficiency. Ms. Linda Brito  has a past surgical history that includes hx colonoscopy (2015); hx septoplasty (1972); hx heent (Bilateral, at age 1); hx tonsillectomy; hx heent (11/2017); hx heent (1957 & 2011); hx appendectomy; hx dilation and curettage (1982, 1999, 2003); hx dilation and curettage (2017); hx gyn (2017); hx hysterectomy (2018); hx orthopaedic (Right); pr shoulder surg proc unlisted (Bilateral, last ones 2006 & 2007); OPEN TIP RHINOPLASTY (N/A, 11/27/2018); TURBINATE REDUCTION (N/A, 8/27/2018); REPAIR OF VESTIBULAR STENOSSIS (N/A, 8/27/2018); TURBINATE REDUCTION (N/A, 6/4/2018); BILATERAL INTERNAL NASAL VALVE REPAIR WITH  ALLOGRAFT (Bilateral, 6/4/2018);  HYSTERECTOMY ROBOTIC ASSISTED BILATERAL SALPINGO OOPHORECTOMY (Bilateral, 2/27/2018); BILATERAL MAXILLARY BALLOON SINUPLASTY (Bilateral, 11/20/2017); SUBMUCOUS RESECTION INFERIOR TURBINATES (N/A, 11/20/2017); DILATATION AND CURETTAGE HYSTEROSCOPY WITH MYOSURE, CERVICAL POLYPECTOMY, ENDOMETRIAL POLYPECTOMY (N/A, 8/2/2017); BRONCHOSCOPY (N/A, 12/31/2015); BREAST NEEDLE LOCALIZED LUMPECTOMY LEFT / PREOP @1000/ BREAST CENTER @1130 FOR LEFT US NEEDLE LOC/ SURGERY @1330 (Left, 11/20/2015); COLONOSCOPY/ BMI 43.89 (N/A, 7/23/2014); and ENDOSCOPIC POLYPECTOMY (N/A, 7/23/2014). Social History/Living Environment:  
Home Environment: Private residence # Steps to Enter: 4 Rails to Enter: No 
Wheelchair Ramp: No 
One/Two Story Residence: Two story # of Interior Steps: 13(then landing then 4 more) Interior Rails: Left Living Alone: No 
Support Systems: Child(zamzam) Patient Expects to be Discharged to[de-identified] Private residence Current DME Used/Available at Home: Walker, rolling Tub or Shower Type: Shower Prior Level of Function/Work/Activity: 
Pt reports that typically she lives with her son and that he goes to college 2 days/wk (she drives him to school). Pt reports that normally she is independent with ADL/functional mobility. Pt denies any recent falls. Personal Factors:   
      Overall Behavior:  Anxious and tearful Other factors that influence how disability is experienced by the patient:  Multiple co-morbidities Number of Personal Factors/Comorbidities that affect the Plan of Care: Expanded review of therapy/medical records (1-2):  MODERATE COMPLEXITY ASSESSMENT OF OCCUPATIONAL PERFORMANCE[de-identified]  
Activities of Daily Living:  
Basic ADLs (From Assessment) Complex ADLs (From Assessment) Feeding: Supervision Oral Facial Hygiene/Grooming: Stand-by assistance Bathing: Moderate assistance Upper Body Dressing: Minimum assistance Lower Body Dressing: Moderate assistance Toileting: Moderate assistance Instrumental ADL Meal Preparation: Total assistance Homemaking: Total assistance Grooming/Bathing/Dressing Activities of Daily Living Grooming Washing Hands: Stand-by assistance Brushing Teeth: Stand-by assistance Toileting Toileting Assistance: Stand-by assistance Bladder Hygiene: Stand-by assistance Upper Body Dressing Assistance Shirt simulation with hospital gown: Contact guard assistance Functional Transfers Bathroom Mobility: Stand-by assistance;Contact guard assistance Bed/Mat Mobility Supine to Sit: Stand-by assistance Most Recent Physical Functioning:  
Gross Assessment: 
  
         
  
Posture: 
Posture (WDL): (unable to fully assess secondary to vertigo) Balance: 
Sitting: Intact Sitting - Static: Good (unsupported) Sitting - Dynamic: Good (unsupported) Standing: Impaired Standing - Static: Fair Standing - Dynamic : Fair Bed Mobility: 
Supine to Sit: Stand-by assistance Wheelchair Mobility: 
  
Transfers: 
   
 
    
 
Patient Vitals for the past 6 hrs: 
 BP BP Patient Position SpO2 Pulse 12/04/18 0800 (!) 162/92 At rest 95 % 75  
12/04/18 1200 153/83 Sitting 99 % 64 Mental Status Neurologic State: Alert Orientation Level: Oriented X4 Cognition: Follows commands Perception: Appears intact Perseveration: No perseveration noted Safety/Judgement: Fall prevention Physical Skills Involved: 1. Range of Motion 2. Balance 3. Strength 4. Activity Tolerance 5. Sensation 6. Fine Motor Control 7. Gross Motor Control 8. Vision Cognitive Skills Affected (resulting in the inability to perform in a timely and safe manner): 1. None Psychosocial Skills Affected: 1. Habits/Routines 2. Environmental Adaptation 3. Self-Awareness Number of elements that affect the Plan of Care: 5+:  HIGH COMPLEXITY CLINICAL DECISION MAKING:  
[employer] AM-PAC 6 Clicks Daily Activity Inpatient Short Form How much help from another person does the patient currently need. .. Total A Lot A Little None 1. Putting on and taking off regular lower body clothing? [] 1   [x] 2   [] 3   [] 4  
2. Bathing (including washing, rinsing, drying)? [] 1   [x] 2   [] 3   [] 4  
3. Toileting, which includes using toilet, bedpan or urinal?   [] 1   [x] 2   [] 3   [] 4  
4. Putting on and taking off regular upper body clothing?    [] 1   [] 2   [x] 3   [] 4  
 5. Taking care of personal grooming such as brushing teeth? [] 1   [] 2   [x] 3   [] 4  
6. Eating meals? [] 1   [] 2   [x] 3   [] 4  
© 2007, Trustees of [address] [place] [address] [address] [address], under license to [employer]. All rights reserved Score:  Initial: 15 Most Recent: X (Date: -- ) Interpretation of Tool:  Represents activities that are increasingly more difficult (i.e. Bed mobility, Transfers, Gait). Score 24 23 22-20 19-15 14-10 9-7 6 Modifier CH CI CJ CK CL CM CN   
 
? Self Care:  
  - CURRENT STATUS: CK - 40%-59% impaired, limited or restricted  - GOAL STATUS: CJ - 20%-39% impaired, limited or restricted  - D/C STATUS:  ---------------To be determined--------------- Payor: Olamide Boateng / Plan: [address] [place] [address] HMO / Product Type: Managed Care Medicare /   
 
Medical Necessity:    
· Patient demonstrates good rehab potential due to higher previous functional level. Reason for Services/Other Comments: 
· Patient continues to require skilled intervention due to decreased independence with ADL/functional transfers. Use of outcome tool(s) and clinical judgement create a POC that gives a: LOW COMPLEXITY  
 
 
 
TREATMENT:  
(In addition to Assessment/Re-Assessment sessions the following treatments were rendered) Pre-treatment Symptoms/Complaints:   
Pain: Initial:  
Pain Intensity 1: 0  Post Session:  same Therapeutic Activity: (25 minutes): Therapeutic activities including Bed transfers, Chair transfers, Toilet transfers and static/dynamic standing  to improve mobility, strength and balance. Required SBA/CGA to promote static and dynamic balance in standing. Self Care: (15 minutes): Procedure(s) (per grid) utilized to improve and/or restore self-care/home management as related to dressing, toileting and grooming. Required minimal visual, verbal and   cueing to facilitate activities of daily living skills. Therapeutic Exercise: (13 minutes):  Exercises per grid below to improve mobility and strength. Required minimal visual and verbal cues to promote proper body mechanics. Progressed resistance, range and repetitions as indicated. Date: 
12/4/18 Date: 
 Date: Activity/Exercise Parameters Parameters Parameters Wrist flexion/extension with 3# weight 15 reps Elbow flexion/extension with 3# weight 15 reps Ankle flexion/extension 15 reps Knee flexion/extension 15 reps Hip flexion/extension 15 reps Hip add/abd 15 reps Braces/Orthotics/Lines/Etc:  
· IV 
· O2 Device: Room air Treatment/Session Assessment:   
· Response to Treatment:  Good effort; dizziness with mobility · Interdisciplinary Collaboration:  
o Certified Occupational Therapy Assistant 
o Registered Nurse · After treatment position/precautions:  
o Up in chair 
o Bed/Chair-wheels locked 
o Call light within reach 
o RN notified · Compliance with Program/Exercises: Will assess as treatment progresses. · Recommendations/Intent for next treatment session: \"Next visit will focus on advancements to more challenging activities and reduction in assistance provided\". Total Treatment Duration: OT Patient Time In/Time Out Time In: 0930 Time Out: 8083 Taras Maldonado

## 2018-12-04 NOTE — PROGRESS NOTES
Warfarin dosing per pharmacist 
 
Sidra Estrada is a 58 y.o. female. Height: 5' 1\" (154.9 cm)    Weight: 104.6 kg (230 lb 8 oz) Indication:  History of DVT after knee surgery, acute CVA Goal INR:  2-3 Home dose:  6 mg qhs 
 
Risk factors or significant drug interactions:  doxycycline Other anticoagulants:  none Daily Monitoring Date  INR     Warfarin dose HGB              Notes 12/1  1.5  6 mg  14.7 
12/2  ---  6 mg  14.2 
12/3  1.4  9 mg  13.9   
12/4                 1.7                   9 mg                --- Pharmacy consulted to manage warfarin for Ms. Wali Bryant during this admission. She presented with subtherapeutic INR after holding warfarin for recent rhinoplasty reversal surgery. INR has increased after increase of warfarin dose to warfarin 9mg PO daily. Continue warfarin 9mg PO daily and daily INR. Pharmacy will continue to follow. Please call with any questions. Jose Alfredo Knapp, PharmD  [facility] 
[email or web address]

## 2018-12-04 NOTE — PROGRESS NOTES
Virtual Utilization Review RN  has determined this patient meets the Condition Code 44 criteria under the Medicare guidelines for change from Inpatient to observation status. Admission status has been changed in the computer system. A copy of the Utilization Review RN documentation/RON Account Notes Report and the MOON letter explaining the outpatient/observation services were given to patient/family representative with verbal explanation and verbal understanding was received about information given. Letter signed by patient with date and time. Signed copy placed in the patient's chart for scanning by HIS and copy left at bedside for patient information.  notified.

## 2018-12-04 NOTE — PROGRESS NOTES
Neurology Daily Progress Note Assessment:  
 
Acute onset vertigo with apparent transient right-sided weakness.    
History of central retinal artery occlusion on chronic Coumadin Plan:  
 
Physical therapy for treatment of acute vertigo Avoid vestibular sedatives Antiemetics as needed Continue warfarin Subjective: Interval history: 
 
Patient reports that she is doing better today. She continues to have dizziness and some weakness in right leg, but she feels that these are improving with PT. Plans to d/c home tomorrow with home therapy. History: 
 
Lorrie Art is a 58 y.o. female who is being seen for vertigo/potential stroke. Review of systems negative with exception of pertinent positives and negatives noted above. Objective:  
 
Vitals:  
 12/04/18 0004 12/04/18 0404 12/04/18 0800 12/04/18 1200 BP: 144/84 136/84 (!) 162/92 153/83 Pulse: 75 75 75 64 Resp: 20 20 20 20 Temp: 97.6 °F (36.4 °C) 97.5 °F (36.4 °C) 97.8 °F (36.6 °C) 98 °F (36.7 °C) SpO2: 96% 95% 95% 99% Weight:      
Height:      
  
 
 
Current Facility-Administered Medications:  
  warfarin (COUMADIN) tablet 9 mg, 9 mg, Oral, QPM, Tee Castellanos MD, 9 mg at 12/03/18 1719   pantoprazole (PROTONIX) tablet 40 mg, 40 mg, Oral, ACB&D, Tee Castellanos MD, 40 mg at 12/04/18 2623   bisacodyl (DULCOLAX) tablet 5 mg, 5 mg, Oral, BID PRN, Mitzy Conklin MD, 5 mg at 12/03/18 1725   sodium chloride (OCEAN) 0.65 % nasal squeeze bottle 2 Spray, 2 Spray, Both Nostrils, Q2H PRN, Mitzy Conklin MD, 2 [place] at 12/03/18 1225 
  diazePAM (VALIUM) tablet 5 mg, 5 mg, Oral, Q6H PRN, Lesa Hebert MD, 5 mg at 12/04/18 0945   allopurinol (ZYLOPRIM) tablet 300 mg, 300 mg, Oral, QHS, Tee Castellanos MD, 300 mg at 12/03/18 2143 
  doxycycline (VIBRAMYCIN) capsule 100 mg, 100 mg, Oral, BID, Tee Castellanos MD, 100 mg at 12/04/18 1194   DULoxetine (CYMBALTA) capsule 60 mg, 60 mg, Oral, DAILY, Tee Castellanos MD, 60 mg at 12/04/18 0849 
  lisinopril (PRINIVIL, ZESTRIL) tablet 20 mg, 20 mg, Oral, QPM, Tee Csatellanos MD, 20 mg at 12/03/18 1719   pregabalin (LYRICA) capsule 50 mg, 50 mg, Oral, BID, Tee Castellanos MD, 50 mg at 12/04/18 0849 
  oxyCODONE-acetaminophen (PERCOCET) 5-325 mg per tablet 1 Tab, 1 Tab, Oral, Q4H PRN, Tee Castellanos MD, 1 Tab at 12/01/18 1829 
  sodium chloride (NS) flush 5-10 mL, 5-10 mL, IntraVENous, Q8H, Tee Castellanos MD, 5 mL at 12/04/18 0600 
  sodium chloride (NS) flush 5-10 mL, 5-10 mL, IntraVENous, PRN, Tee Castellanos MD, 10 mL at 12/02/18 2053 
  0.9% sodium chloride infusion, 75 mL/hr, IntraVENous, CONTINUOUS, Tee Castellanos MD, Last Rate: 75 mL/hr at 12/03/18 1403, 75 mL/hr at 12/03/18 1403   acetaminophen (TYLENOL) tablet 650 mg, 650 mg, Oral, Q4H PRN, Tee Castellanos MD, 650 mg at 12/02/18 0430 
  ondansetron (ZOFRAN) injection 4 mg, 4 mg, IntraVENous, Q4H PRN, Tee Castellanos MD, 4 mg at 12/01/18 1829 
  aspirin delayed-release tablet 325 mg, 325 mg, Oral, DAILY, Tee Castellanos MD, 325 mg at 12/04/18 0849 
  atorvastatin (LIPITOR) tablet 40 mg, 40 mg, Oral, DAILY, Tee Castellanos MD, 40 mg at 12/04/18 8704 Recent Results (from the past 12 hour(s)) PROTHROMBIN TIME + INR Collection Time: 12/04/18  5:14 AM  
Result Value Ref Range Prothrombin time 19.3 (H) 11.7 - 14.5 sec INR 1.7 Physical Exam: 
General - Well developed, well nourished, in no apparent distress. Pleasant and conversent. HEENT - Normocephalic, atraumatic. Conjunctiva are clear. Neck - Supple without masses Extremities - Peripheral pulses intact. No edema and no rashes. Neurological examination - Comprehension, attention, memory and reasoning appear intact.  Language and speech are normal. On cranial nerve examination pupils are equal round and reactive to light. Visual fields are full to finger confrontation. Left horizontal gaze palsy. Face is symmetric and sensation is intact to light touch. Hearing is intact. Motor examination - There is normal muscle tone and bulk. Strength is 5/5 in BUE, LLE, 4/5 in RLE. Muscle stretch reflexes are normoactive and there are no pathological reflexes present. Sensation is intact to light touch in all extremities. Cerebellar examination is normal. Gait not assessed. Signed By: Olga Lidia Arzola NP December 4, 2018

## 2018-12-04 NOTE — PROGRESS NOTES
[facility] - INPATIENT Face to Face Encounter Patients Name: Kayley Gardner    YOB: 1955 Ordering Physician: Dr Lyndon Bright. Leroy Duong Primary Diagnosis: [facility] [I63.9] Date of Face to Face:   12/4/2018 Face to Face Encounter findings are related to primary reason for home care:   yes. 1. I certify that the patient needs intermittent care as follows: physical therapy: strengthening, stretching/ROM, transfer training, gait/stair training, balance training and pt/caregiver education 
occupational therapy:  ADL safety (ie. cooking, bathing, dressing), ROM and pt/caregiver education 2. I certify that this patient is homebound, that is: 1) patient requires the use of a none device, special transportation, or assistance of another to leave the home; or 2) patient's condition makes leaving the home medically contraindicated; and 3) patient has a normal inability to leave the home and leaving the home requires considerable and taxing effort. Patient may leave the home for infrequent and short duration for medical reasons, and occasional absences for non-medical reasons. Homebound status is due to the following functional limitations: Patient with strength deficits limiting the performance of all ADL's without caregiver assistance or the use of an assistive device. Patient with poor safety awareness and is at risk for falls without assistance of another person and the use of an assistive device. Patient with poor ambulation endurance limiting their safe ability to ascend/descend the required number of steps to leave the home. 3. I certify that this patient is under my care and that I, or a nurse practitioner or  [id number], or clinical nurse specialist, or certified nurse midwife, working with me, had a Face-to-Face Encounter that meets the physician Face-to-Face Encounter requirements.   The following are the clinical findings from the Face-to-Face encounter that support the need for skilled services and is a summary of the encounter:  
 
See hospital chart Cecy Castro RN 
12/4/2018 THE FOLLOWING TO BE COMPLETED BY THE COMMUNITY PHYSICIAN: 
 
I concur with the findings described above from the F2F encounter that this patient is homebound and in need of a skilled service. Certifying Physician: _____________________________________ Printed Certifying Physician Name: _____________________________________ Date: _________________

## 2018-12-04 NOTE — PROGRESS NOTES
Problem: Falls - Risk of 
Goal: *Absence of Falls Document April Allison Fall Risk and appropriate interventions in the flowsheet. Outcome: Progressing Towards Goal 
Fall Risk Interventions: 
Mobility Interventions: Bed/chair exit alarm, Communicate number of staff needed for ambulation/transfer, Patient to call before getting OOB, PT Consult for mobility concerns Medication Interventions: Bed/chair exit alarm, Evaluate medications/consider consulting pharmacy, Patient to call before getting OOB Elimination Interventions: Bed/chair exit alarm, Call light in reach, Patient to call for help with toileting needs, Toileting schedule/hourly rounds History of Falls Interventions: Bed/chair exit alarm, Consult care management for discharge planning, Door open when patient unattended Problem: Pressure Injury - Risk of 
Goal: *Prevention of pressure injury Document Handy Scale and appropriate interventions in the flowsheet. Outcome: Progressing Towards Goal 
Pressure Injury Interventions: Activity Interventions: Increase time out of bed, Pressure redistribution bed/mattress(bed type), PT/OT evaluation Mobility Interventions: HOB 30 degrees or less, Pressure redistribution bed/mattress (bed type), PT/OT evaluation Nutrition Interventions: Document food/fluid/supplement intake, Offer support with meals,snacks and hydration

## 2018-12-04 NOTE — PROGRESS NOTES
Problem: Mobility Impaired (Adult and Pediatric) Goal: *Acute Goals and Plan of Care (Insert Text) Goals updated 12/4/2018 Discharge Goals: 
(1.)Ms. Ritchie Dave will move from supine to sit and sit to supine , scoot up and down and roll side to side in bed with MODIFIED INDEPENDENCE within 7 treatment day(s). (2.)Ms. Ritchie Dave will demonstrate good static and dynamic sitting balance within 7 days. (3.)Ms. Ritchie Dave will perform B LE therapeutic exercises x 20 reps with INDEPENDENCE within 7 days to increase strength for improved safety and independence in transfers and gait. (4.)Ms. Ritchie Dave will transfer from bed to chair with least restrictive device and MODIFIED INDEPENDENCE within 7 treatment days. (5.)Ms. Ritchie Dave will ambulate 250ft with MODIFIED INDEPENDENCE and least restrictive device within 7 treatment days. ________________________________________________________________________________________________ PHYSICAL THERAPY: Re-evaluation 12/4/2018OBSERVATION: Hospital Day: 4 Payor: Leonel Galaviz / Plan: [address] [place] [address] HMO / Product Type: [facility] Care Medicare /  
  
NAME/AGE/GENDER: Steven De Leon is a 58 y.o. female PRIMARY DIAGNOSIS: Acute CVA (cerebrovascular accident) ([facility] [address] [I63.9] Acute CVA (cerebrovascular accident) ([facility] [address] Acute CVA (cerebrovascular accident) ([facility] [address] ICD-10: Treatment Diagnosis:  
 · Generalized Muscle Weakness (M62.81) · Other lack of cordination (R27.8) · Difficulty in walking, Not elsewhere classified (R26.2) · History of falling (Z91.81) Precaution/Allergies: 
Clindamycin; Iodinated contrast- oral and iv dye; Tramadol hcl; Ultram [tramadol]; Aloe vera; Codeine; Heparin analogues; Lanolin; Other medication; Vigamox [moxifloxacin]; Aloe; Aspartame; Cefzil [cefprozil]; Fentanyl; Heparin; Morphine;  Oxycodone hcl; Potassium clavulanate; Propoxyphene napsylate; Sulfa (sulfonamide antibiotics); and Varicella virus vacc live (pf)  
  
ASSESSMENT:  
 Ms. Romie Morel was supine upon contact and agreeable to PT re-evaluation. Pt reports minimal dizziness currently, but no pain. Pt transferred from supine to sitting EOB with CGA and additional time to complete task, with increased dizziness and lightheadedness. B [place] test performed, no nystagmus noted and no dizziness with tests, but dizzy with supine to sitting up position changes. Head thrust test negative. Orthostatic BP assessed: 151/78 supine, 140/81 sitting, and 161/88 standing, with lightheadedness from supine to sitting, but not standing. Pt ambulated 5ft with CGA and RW, but stated she needed to sit back down due to dizziness so returned to sitting EOB. Dizziness does not appear to be vestibular in nature, and may be related to BP or more central origin. Goals assessed and updated to show progress. Will continue efforts. This section established at most recent assessment PROBLEM LIST (Impairments causing functional limitations): 1. Decreased Strength 2. Decreased ADL/Functional Activities 3. Decreased Transfer Abilities 4. Decreased Ambulation Ability/Technique 5. Decreased Balance 6. Decreased Activity Tolerance 7. Decreased [place] with Home Exercise Program 
 INTERVENTIONS PLANNED: (Benefits and precautions of physical therapy have been discussed with the patient.) 1. Balance Exercise 2. Bed Mobility 3. Gait Training 4. Home Exercise Program (HEP) 5. Therapeutic Activites 6. Therapeutic Exercise/Strengthening 7. Transfer Training TREATMENT PLAN: Frequency/Duration: 3 times a week for duration of hospital stay Rehabilitation Potential For Stated Goals: Good RECOMMENDED REHABILITATION/EQUIPMENT: (at time of discharge pending progress): Due to the probability of continued deficits (see above) this patient will likely need continued skilled physical therapy after discharge. Equipment:  
? None at this time HISTORY:  
 History of Present Injury/Illness (Reason for Referral): 
Per chart: Ms. Priti Portillo is a 57 yo female with a past medical history of Essential hypertension, CVA (optic eye stroke 2007, pt describes it as a stroke to her left eye vessel but caused left eye impaired vision and left ear hearing loss), gout (on allopurinol and Colchicine prn), mitral valve prolapse, history of DVT after knee surgery and is on chronic AC with Warfarin, recent rhinoplasty reversal surgery (11/27/18).   
Pt presented to the ER with symptoms of dizziness, right sided weakness and facial droop. Pt reports that ~ 10:30 pm while watching TV she started feeling spinning sensation and nauseous. She reports right upper and lower extremity weakness and right facial numbness and weakness. No fever, chills, CP, or urinary symptoms. Pt is at home on ASA 81 mg daily.  
  
In the ER she was afebrile, hemodynamically stable and saturating well on RA. Pt was evaluated by tele-neuro and did not qualify for tpa as she was outside the time window. Recommended for admission with MRI brain.  
  
Past Medical History/Comorbidities: Ms. Priti Portillo  has a past medical history of Adverse effect of anesthesia, Anticoagulated on Coumadin, Anxiety, Arthritis, Bleeding from nipple in female, Breast lump, Chronic pain, CKD (chronic kidney disease), Current use of long term anticoagulation, Difficult intubation, GERD (gastroesophageal reflux disease), H/O right knee surgery, History of CVA (cerebrovascular accident), History of DVT of lower extremity, HTN (hypertension), gout, Hyperlipidemia, Insomnia, Left ear hearing loss, Mitral valve prolapse, Morbid obesity ([address] MVP (mitral valve prolapse), Pinched nerve in neck, Post-operative nausea and vomiting, PUD (peptic ulcer disease), Right shoulder injury, Stroke ([address] Thromboembolus ([address] Torn rotator cuff, Unspecified adverse effect of anesthesia, and Vitamin D deficiency. Ms. Adele Saldana  has a past surgical history that includes hx colonoscopy (2015); hx septoplasty (1972); hx heent (Bilateral, at age 1); hx tonsillectomy; hx heent (11/2017); hx heent (1957 & 2011); hx appendectomy; hx dilation and curettage (1982, 1999, 2003); hx dilation and curettage (2017); hx gyn (2017); hx hysterectomy (2018); hx orthopaedic (Right); pr shoulder surg proc unlisted (Bilateral, last ones 2006 & 2007); OPEN TIP RHINOPLASTY (N/A, 11/27/2018); TURBINATE REDUCTION (N/A, 8/27/2018); REPAIR OF VESTIBULAR STENOSSIS (N/A, 8/27/2018); TURBINATE REDUCTION (N/A, 6/4/2018); BILATERAL INTERNAL NASAL VALVE REPAIR WITH  ALLOGRAFT (Bilateral, 6/4/2018); HYSTERECTOMY ROBOTIC ASSISTED BILATERAL SALPINGO OOPHORECTOMY (Bilateral, 2/27/2018); BILATERAL MAXILLARY BALLOON SINUPLASTY (Bilateral, 11/20/2017); SUBMUCOUS RESECTION INFERIOR TURBINATES (N/A, 11/20/2017); DILATATION AND CURETTAGE HYSTEROSCOPY WITH MYOSURE, CERVICAL POLYPECTOMY, ENDOMETRIAL POLYPECTOMY (N/A, 8/2/2017); BRONCHOSCOPY (N/A, 12/31/2015); BREAST NEEDLE LOCALIZED LUMPECTOMY LEFT / PREOP @1000/ BREAST CENTER @1130 FOR LEFT US NEEDLE LOC/ SURGERY @1330 (Left, 11/20/2015); COLONOSCOPY/ BMI 43.89 (N/A, 7/23/2014); and ENDOSCOPIC POLYPECTOMY (N/A, 7/23/2014). Social History/Living Environment:  
Home Environment: Private residence # Steps to Enter: 4 Rails to Enter: No 
Wheelchair Ramp: No 
One/Two Story Residence: Two story # of Interior Steps: 13(then landing then 4 more) Interior Rails: Left Living Alone: No 
Support Systems: Child(zamzam) Patient Expects to be Discharged to[de-identified] Private residence Current DME Used/Available at Home: Walker, rolling Tub or Shower Type: Shower Prior Level of Function/Work/Activity: 
Pt lives with son. Pt is alone 2 days a week while son is at school. Pt lives in a 2 level home where bedroom is on second level. 3 steps to enter home from outside. There is a half bath on main level. Pt does drive.  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 Was ambulating independently with a rollator walker PRN. Personal Factors:   
      Sex:  female Age:  58 y.o. Number of Personal Factors/Comorbidities that affect the Plan of Care: 3+: HIGH COMPLEXITY EXAMINATION:  
Most Recent Physical Functioning:  
Gross Assessment: 
  
         
  
Posture: 
  
Balance: 
Sitting: Impaired Sitting - Static: Good (unsupported); Other (comment)(occasional with dizziness) Sitting - Dynamic: Fair (occasional) Standing: Impaired Standing - Static: Fair Standing - Dynamic : Fair Bed Mobility: 
Rolling: Stand-by assistance Supine to Sit: Stand-by assistance Sit to Supine: Stand-by assistance Scooting: Stand-by assistance Wheelchair Mobility: 
  
Transfers: 
Sit to Stand: Stand-by assistance;Contact guard assistance Stand to Sit: Stand-by assistance;Contact guard assistance Gait: 
  
Base of Support: Widened Speed/Rose: Shuffled; Slow Step Length: Left shortened;Right shortened Gait Abnormalities: Decreased step clearance Distance (ft): 5 Feet (ft) Assistive Device: Walker, rolling Ambulation - Level of Assistance: Contact guard assistance Body Structures Involved: 1. Nerves 2. Muscles Body Functions Affected: 1. Neuromusculoskeletal 
2. Movement Related Activities and Participation Affected: 1. General Tasks and Demands 2. Mobility 3. Self Care Number of elements that affect the Plan of Care: 4+: HIGH COMPLEXITY CLINICAL PRESENTATION:  
Presentation: Evolving clinical presentation with changing clinical characteristics: MODERATE COMPLEXITY CLINICAL DECISION MAKING:  
[employer] [facility] 6 Clicks Basic Mobility Inpatient Short Form How much difficulty does the patient currently have. .. Unable A Lot A Little None 1. Turning over in bed (including adjusting bedclothes, sheets and blankets)? [] 1   [] 2   [x] 3   [] 4  
2.   Sitting down on and standing up from a chair with arms ( e.g., wheelchair, bedside commode, etc.)   [] 1   [] 2   [x] 3   [] 4  
3. Moving from lying on back to sitting on the side of the bed? [] 1   [] 2   [x] 3   [] 4 How much help from another person does the patient currently need. .. Total A Lot A Little None 4. Moving to and from a bed to a chair (including a wheelchair)? [] 1   [] 2   [x] 3   [] 4  
5. Need to walk in hospital room? [] 1   [x] 2   [] 3   [] 4  
6. Climbing 3-5 steps with a railing? [x] 1   [] 2   [] 3   [] 4  
© 2007, Trustees of [employer], under license to [employer]. All rights reserved Score:  Initial: 12 Most Recent: 15 (Date: 12/4/2018 ) Interpretation of Tool:  Represents activities that are increasingly more difficult (i.e. Bed mobility, Transfers, Gait). Score 24 23 22-20 19-15 14-10 9-7 6 Modifier CH CI CJ CK CL CM CN   
 
? Mobility - Walking and Moving Around:  
  - CURRENT STATUS: CK - 40%-59% impaired, limited or restricted  - GOAL STATUS: CJ - 20%-39% impaired, limited or restricted  - D/C STATUS:  ---------------To be determined--------------- Payor: Deidre Arana / Plan: [address] [place] [address] HMO / Product Type: Managed Care Medicare /   
 
Medical Necessity:    
· Patient demonstrates good rehab potential due to higher previous functional level. Reason for Services/Other Comments: 
· Patient continues to require present interventions due to patient's inability to function at baseline. Use of outcome tool(s) and clinical judgement create a POC that gives a: Clear prediction of patient's progress: LOW COMPLEXITY  
  
 
 
 
TREATMENT:  
(In addition to Assessment/Re-Assessment sessions the following treatments were rendered) Pre-treatment Symptoms/Complaints: \"I'm still dizzy\" Pain: Initial:  
Pain Intensity 1: 0  Post Session:  0/10 Assessment/Reassessment only, no treatment provided today Braces/Orthotics/Lines/Etc:  
· O2 Device: Room air Treatment/Session Assessment:   
· Response to Treatment:  See above · Interdisciplinary Collaboration:  
o Physical Therapist 
o Registered Nurse · After treatment position/precautions:  
o Supine in bed 
o Bed/Chair-wheels locked 
o Bed in low position 
o Call light within reach 
o RN notified · Compliance with Program/Exercises: Will assess as treatment progresses · Recommendations/Intent for next treatment session: \"Next visit will focus on advancements to more challenging activities and reduction in assistance provided\". Total Treatment Duration: PT Patient Time In/Time Out Time In: 1769 Time Out: 1530 Danyel Payton, PT

## 2018-12-05 VITALS
WEIGHT: 230.5 LBS | HEIGHT: 61 IN | SYSTOLIC BLOOD PRESSURE: 166 MMHG | TEMPERATURE: 97.4 F | RESPIRATION RATE: 17 BRPM | OXYGEN SATURATION: 94 % | HEART RATE: 74 BPM | DIASTOLIC BLOOD PRESSURE: 90 MMHG | BODY MASS INDEX: 43.52 KG/M2

## 2018-12-05 PROBLEM — R42 VERTIGO: Status: ACTIVE | Noted: 2018-12-05

## 2018-12-05 PROBLEM — I63.9 ACUTE CVA (CEREBROVASCULAR ACCIDENT) (HCC): Status: RESOLVED | Noted: 2018-12-01 | Resolved: 2018-12-05

## 2018-12-05 LAB
BACTERIA SPEC CULT: NORMAL
INR PPP: 1.9
PROTHROMBIN TIME: 21.9 SEC (ref 11.7–14.5)
SERVICE CMNT-IMP: NORMAL

## 2018-12-05 PROCEDURE — 36415 COLL VENOUS BLD VENIPUNCTURE: CPT

## 2018-12-05 PROCEDURE — 99218 HC RM OBSERVATION: CPT

## 2018-12-05 PROCEDURE — 74011250637 HC RX REV CODE- 250/637: Performed by: INTERNAL MEDICINE

## 2018-12-05 PROCEDURE — 85610 PROTHROMBIN TIME: CPT

## 2018-12-05 RX ORDER — WARFARIN 6 MG/1
6 TABLET ORAL DAILY
Qty: 90 TAB | Refills: 3 | Status: SHIPPED
Start: 2018-12-05 | End: 2019-04-23 | Stop reason: SDUPTHER

## 2018-12-05 RX ORDER — GABAPENTIN 100 MG/1
100 CAPSULE ORAL 3 TIMES DAILY
Qty: 90 CAP | Refills: 0 | Status: SHIPPED | OUTPATIENT
Start: 2018-12-05 | End: 2019-07-10 | Stop reason: SDUPTHER

## 2018-12-05 RX ORDER — ATORVASTATIN CALCIUM 40 MG/1
5 TABLET, FILM COATED ORAL DAILY
Qty: 30 TAB | Refills: 0 | Status: SHIPPED | OUTPATIENT
Start: 2018-12-06 | End: 2019-09-05

## 2018-12-05 RX ORDER — DOXYCYCLINE 100 MG/1
100 CAPSULE ORAL 2 TIMES DAILY
Qty: 20 CAP | Refills: 0 | Status: SHIPPED
Start: 2018-12-05 | End: 2018-12-08

## 2018-12-05 RX ORDER — MECLIZINE HCL 12.5 MG 12.5 MG/1
12.5 TABLET ORAL
Qty: 30 TAB | Refills: 0 | Status: SHIPPED | OUTPATIENT
Start: 2018-12-05 | End: 2018-12-12

## 2018-12-05 RX ADMIN — ASPIRIN 325 MG: 325 TABLET, DELAYED RELEASE ORAL at 08:26

## 2018-12-05 RX ADMIN — PANTOPRAZOLE SODIUM 40 MG: 40 TABLET, DELAYED RELEASE ORAL at 05:47

## 2018-12-05 RX ADMIN — ATORVASTATIN CALCIUM 40 MG: 40 TABLET, FILM COATED ORAL at 08:27

## 2018-12-05 RX ADMIN — PREGABALIN 50 MG: 50 CAPSULE ORAL at 08:27

## 2018-12-05 RX ADMIN — DOXYCYCLINE HYCLATE 100 MG: 100 CAPSULE ORAL at 08:26

## 2018-12-05 RX ADMIN — Medication 5 ML: at 05:46

## 2018-12-05 RX ADMIN — Medication 10 ML: at 14:00

## 2018-12-05 NOTE — PROGRESS NOTES
Discharge instructions  all new medications,medication side effects sheet, follow up appointment and  prescriptions reviewed and explained to the patient. Patient verbalizes understanding of instructions. A copy of discharge instructions and prescriptions  have been given to patient. Opportunity for questions provided. Patient to be discharged when ride arrives later this afternoon

## 2018-12-05 NOTE — PROGRESS NOTES
Warfarin dosing per pharmacist 
 
Silvestre Mccormack is a 58 y.o. female. Height: 5' 1\" (154.9 cm)    Weight: 104.6 kg (230 lb 8 oz) Indication:  History of DVT after knee surgery,h/o of CVA Goal INR:  2-3 Home dose:  6 mg qhs 
 
Risk factors or significant drug interactions:  doxycycline Other anticoagulants:  none Daily Monitoring Date  INR     Warfarin dose HGB              Notes 12/1  1.5  6 mg  14.7 
12/2  ---  6 mg  14.2 
12/3  1.4  9 mg  13.9   
12/4                 1.7                   9 mg                --- 
12/5  1.9  9 mg  --- Pharmacy consulted to manage warfarin for Ms. Noel Norris during this admission. She presented with subtherapeutic INR after holding warfarin for recent rhinoplasty reversal surgery. INR trending up appropriately following increase in dose on 12/3. INR slightly subtherapeutic today however expect the lab to be within therapeutic range tomorrow. Continue warfarin 9 mg daily. Daily INR. Pharmacy will continue to follow. Please call with any questions. Thank you, Talisha Augustin, PharmD, [facility] Clinical Pharmacist 
[phone]

## 2018-12-05 NOTE — DISCHARGE SUMMARY
Discharge Summary     Patient: Liborio Eubanks MRN: [id number]  SSN: [id number]    YOB: 1955  Age: 58 y.o. Sex: female       Patient: Liborio Eubanks  : 1955 MRN: [id number]      PCP: Mendez Ro MD   Age: 58 y.o. Code Status: Full Code  Sex: female        Discharge Diagnoses   Patient Active Problem List   Diagnosis Code    History of CVA (cerebrovascular accident) Z80.78    GERD (gastroesophageal reflux disease) K21.9    HTN (hypertension) I10    Gouty arthropathy M10.9    Current use of long term anticoagulation Z79.01    History of DVT of lower extremity Z86.718    Hyperlipidemia E78.5    B12 deficiency E53.8    Post traumatic stress disorder (PTSD) F43.10    Left ear hearing loss H91.92    Vertigo R42   Right sided weakness       Hospital Course   Presenting Problem:   Chief Complaint   Patient presents with    Extremity Weakness        HPI: Liborio Eubanks is a 58 y.o. female with PMH fo HTN, central retinal artery occlusion in  with residual L eye impairmenta nd L ear hearing loss and h/o DVT following knee surgery who presented to the ED with c/o dizziness, R sided weakness and facial droop. Symptoms began at 2230 while watching TV. She noticed a spinning sensation and nausea along with RUE/RLE weakness and R facial numbness. She takes ASA 81mg. P recently underwent rhinoplasty reversal surgery on . Hospital Course:   1. Vertigo - Pt underwent stroke work-up with negative CT and MRI brain as well as negative MRA brain/neck. She was evaluated by teleneurologist as well as in-house neurologist. Symptoms were felt to be from an acute vertigo episode and not a central cause. She underwent [place] with PT yesterday with no improvement. Etiology is unclear. Pt continues to have some dizziness when turning her head to the R and sitting up. Weakness improved. She has been able to walk with assistance.  Pt started Lyrica 2 weeks ago and she would like to switch back to gabapentin as she is concerned the Lyrica could worsen her vertigo which is reasonable. Previous elise dose was 100mg TID. Meclizine prescribed prn.  2. H/o central artery occlusion/CVA - LDL 90. Pt not previously on statin and started this admission. Continue Lipitor 5mg daily. 3. Chronic DVT - This appears provoked from surgical procedure. She has chronically been on [facility]. Need for ongoing AC can be addressed with PCP. INR 1.9 today. Coumadin dose increased to 9mg tonight, then resume 6mg. F/U INR on Friday. 4. S/p rhinoplasty - Dr. Michelle Maza removed sutures this admission. OP f/u with him. She needs 3 more days to complete her post-op doxy prescription. 5. HTN - Lisinopril. Consults: Neurology    Procedures: [place], nasal suture removal     Discharge Exam & Pertinent Results   VITALS:  Visit Vitals  /84 (BP 1 Location: Right arm, BP Patient Position: At rest)   Pulse 72   Temp 97.8 °F (36.6 °C)   Resp 18   Ht 5' 1\" (1.549 m)   Wt 104.6 kg (230 lb 8 oz)   LMP  (LMP Unknown)   SpO2 97%   BMI 43.55 kg/m²      Physical Exam:  General: Oriented to person, place, and time. Appears well-developed and well-nourished. Some dizziness when sitting up in bed. HEENT: Normocephalic and atraumatic. Pupils are equal, round, and reactive to light. Extraoccular muscles intact. Cardiovascular: Regular rate and rhythm; no murmurs, rubs, gallops heard. Pulmonary/Chest: No increased work of breathing. No respiratory distress. No wheezes, rales. Abdominal: Soft, non distended, non tender. Bowel sounds are normal.  Musculoskeletal: Normal range of motion, no edema. Neurological: Alert and oriented to person, place, and time.      Recent Pertinent Labs:  Lab Results   Component Value Date/Time    WBC 11.6 (H) 12/03/2018 05:09 AM    HGB 13.9 12/03/2018 05:09 AM    HCT 42.6 12/03/2018 05:09 AM     12/03/2018 05:09 AM     12/03/2018 05:09 AM    K 4.4 12/03/2018 05:09 AM    CL 112 (H) 12/03/2018 05:09 AM    CO2 26 12/03/2018 05:09 AM    BUN 20 12/03/2018 05:09 AM    GLU 93 12/03/2018 05:09 AM       Additional Test Results:   Micro: All Micro Results     Procedure Component Value Units Date/Time    CULTURE, URINE [[id number]] Collected:  12/01/18 1545    Order Status:  Completed Specimen:  Urine from Clean catch Updated:  12/05/18 0732     Special Requests: NO SPECIAL REQUESTS        Culture result:       10,000 to 50,000 COLONIES/mL MIXED SKIN SARA ISOLATED                Imaging:     MRA NECK W WO CONT   Final Result   IMPRESSION: No evidence of significant carotid artery stenosis. MRA BRAIN WO CONT   Final Result   IMPRESSION: Limited study secondary to motion artifact. No gross abnormalities   identified. MRI BRAIN WO CONT   Final Result   IMPRESSION: No acute infarct. Minimal chronic small vessel disease changes. DUPLEX CAROTID BILATERAL   Final Result   IMPRESSION: Less than 50% diameter stenosis of the cervical internal carotid   arteries bilaterally. CT HEAD WO CONT   Final Result   IMPRESSION:  No acute intracranial process. Discharge Medications   Current Discharge Medication List      START taking these medications    Details   atorvastatin (LIPITOR) 40 mg tablet Take 0.5 Tabs by mouth daily. Qty: 30 Tab, Refills: 0         CONTINUE these medications which have CHANGED    Details   doxycycline (VIBRAMYCIN) 100 mg capsule    Gabapentin 100mg    Meclizine 12.5mg Take 1 Cap by mouth two (2) times a day for 3 days. Qty: 20 Cap, Refills: 0    Take 1 tablet by mouth 3 times a day    Take 1 tablet TID prn dizziness        warfarin (COUMADIN) 6 mg tablet Take 1 Tab by mouth daily. Qty: 90 Tab, Refills: 3    Comments: Take 9mg Wed night, then resume 6mg daily. INR check Friday. CONTINUE these medications which have NOT CHANGED    Details   aspirin delayed-release 81 mg tablet Take 81 mg by mouth daily. allopurinol (ZYLOPRIM) 300 mg tablet Take 1 Tab by mouth nightly. Qty: 90 Tab, Refills: 3      temazepam (RESTORIL) 30 mg capsule Take 1 Cap by mouth nightly. Max Daily Amount: 30 mg.  Qty: 30 Cap, Refills: 5    Associated Diagnoses: Other insomnia      DULoxetine (CYMBALTA) 60 mg capsule Take 1 Cap by mouth daily. Indications: Chronic Musculoskeletal Pain, major depressive disorder  Qty: 90 Cap, Refills: 1    Associated Diagnoses: Other insomnia; PTSD (post-traumatic stress disorder)      omeprazole (PRILOSEC) 40 mg capsule Take 1 Cap by mouth two (2) times a day. Indications: gastroesophageal reflux disease  Qty: 180 Cap, Refills: 3    Associated Diagnoses: Gastroesophageal reflux disease with esophagitis      cyanocobalamin 1,000 mcg tablet Take 1,500 mcg by mouth daily. Indications: PREVENTION OF VITAMIN B12 DEFICIENCY, hold until after surgery      lisinopril (PRINIVIL, ZESTRIL) 20 mg tablet Take 1 Tab by mouth daily. Qty: 90 Tab, Refills: 3    Associated Diagnoses: Essential hypertension      oxyCODONE-acetaminophen (PERCOCET) 5-325 mg per tablet Take 1 Tab by mouth every four (4) hours as needed for Pain. Max Daily Amount: 6 Tabs. Qty: 20 Tab, Refills: 0    Associated Diagnoses: Pain at surgical incision      ondansetron (ZOFRAN ODT) 4 mg disintegrating tablet 1 tab Q 8 Hours PRN N/V  Indications: PREVENTION OF POST-OPERATIVE NAUSEA AND VOMITING  Qty: 8 Tab, Refills: 0      tiZANidine (ZANAFLEX) 4 mg tablet Take 1 Tab by mouth three (3) times daily as needed. Indications: Muscle Spasm  Qty: 90 Tab, Refills: 1    Associated Diagnoses: Spasm of back muscles      colchicine (COLCRYS) 0.6 mg tablet Acute gout: 1.2 mg (2 tabs) PO at signs of attack then 0.6 mg (1 tab) 1 h later. No more than 3 tablets in the first 24 h. Continue taking 1 tablet daily. Qty: 30 Tab, Refills: 1    Associated Diagnoses: Gouty arthropathy      OMEGA-3 FATTY ACIDS/FISH OIL (OMEGA 3 FISH OIL PO) Take 2,100 mg by mouth daily.  2100 mg cap--1 cap po qd          STOP taking these medications       LYRICA 50 mg capsule Comments:   Reason for Stopping:                Discharge Follow up and Special Instructions   Disposition: home with [facility]  Follow up with: PCP Friday for INR check  Condition: fair  Diet: DIET REGULAR  Activity: As tolerated  Restrictions: none    Time spent on care of coordination of discharge was 35 minutes for chart review, medication reconciliation, patient education, and coordination of services with case management and nursing.      Khurram Calixto MD  [facility] Hospitalist  12/05/18, 9:47 AM

## 2018-12-05 NOTE — DISCHARGE INSTRUCTIONS
Diet: DIET REGULAR  Activity: As tolerated      DO NOT DRIVE WHILE HAVE DIZZINESS      Stroke: After Your Visit     Your Care Instructions     You have had a stroke. Risk factors for stroke include being overweight, smoking, and sedentary lifestyle. This means that the blood flow to a part of your brain was blocked for some time, which damages the nerve cells in that part of the brain. The part of your body controlled by that part of your brain may not function properly now. The brain is an amazing organ that can heal itself to some degree. The stroke you had damaged part of your brain, but other parts of your brain may take over in some way for the damaged areas. You have already started this process. Going home may be hard for you and your family. The more you can try to do for yourself, the better. Remember to take each day one at a time. Follow-up care is a key part of your treatment and safety. Be sure to make and go to all appointments, and call your doctor if you are having problems. Its also a good idea to know your test results and keep a list of the medicines you take. How can you care for yourself at home? Enter a stroke rehabilitation (rehab) program, if your doctor recommends it. Physical, speech, and occupational therapies can help you manage bathing, dressing, eating, and other basics of daily living. Eat a heart-healthy diet that is low in cholesterol, saturated fat, and salt. Eat lots of fresh fruits and vegetables and foods high in fiber. Increase your activities slowly. Take short rest breaks when you get tired. Gradually increase the amount you walk. Start out by walking a little more than you did the day before. Do not drive until your doctor says it is okay. It is normal to feel sad or depressed after a stroke. If the blues last, talk to your doctor.   If you are having problems with urine leakage, go to the bathroom at regular times, including when you first wake up and at bedtime. Also, limit fluids after dinner. If you are constipated, drink plenty of fluids, enough so that your urine is light yellow or clear like water. If you have kidney, heart, or liver disease and have to limit fluids, talk with your doctor before you increase the amount of fluids you drink. Set up a regular time for using the toilet. If you continue to have constipation, your doctor may suggest using a bulking agent, such as Metamucil, or a stool softener, laxative, or enema. Medicines  Take your medicines exactly as prescribed. Call your doctor if you think you are having a problem with your medicine. You may be taking several medicines. ACE (angiotensin-converting enzyme) inhibitors, angiotensin II receptor blockers (ARBs), beta-blockers, diuretics (water pills), and calcium channel blockers control your blood pressure. Statins help lower cholesterol. Your doctor may also prescribe medicines for depression, pain, sleep problems, anxiety, or agitation. If your doctor has given you medicine that prevents blood clots, such as warfarin (Coumadin), aspirin combined with extended-release dipyridamole (Aggrenox), clopidogrel (Plavix), or aspirin to prevent another stroke, you should:  Tell your dentist, pharmacist, and other health professionals that you take these medicines. Watch for unusual bruising or bleeding, such as blood in your urine, red or black stools, or bleeding from your nose or gums. Get regular blood tests to check your clotting time if you are taking Coumadin. Wear medical alert jewelry that says you take blood thinners. You can buy this at most drugstores. Do not take any over-the-counter medicines or herbal products without talking to your doctor first.  If you take birth control pills or hormone replacement therapy, talk to your doctor about whether they are right for you. For family members and caregivers  Make the home safe.  Set up a room so that your loved one does not have to climb stairs. Be sure the bathroom is on the same floor. Move throw rugs and furniture that could cause falls, and make sure that the lighting is good. Put grab bars and seats in tubs and showers. Find out what your loved one can do and what he or she needs help with. Try not to do things for your loved one that your loved one can do on his or her own. Help him or her learn and practice new skills. Visit and talk with your loved one often. Try doing activities together that you both enjoy, such as playing cards or board games. Keep in touch with your loved one's friends as much as you can, and encourage them to visit. Take care of yourself. Do not try to do everything yourself. Ask other family members to help. Eat well, get enough rest, and take time to do things that you enjoy. Keep up with your own doctor visits, and make sure to take your medicines regularly. Get out of the house as much as you can. Join a local support group. Find out if you qualify for home health care visits to help with rehab or for adult day care. When should you call for help? Call 911 anytime you think you may need emergency care. For example, call if:  You have signs of another stroke. These may include:  Sudden numbness, paralysis, or weakness in your face, arm, or leg, especially on only one side of your body. New problems with walking or balance. Sudden vision changes. Drooling or slurred speech. New problems speaking or understanding simple statements, or you feel confused. A sudden, severe headache that is different from past headaches. Call 911 even if these symptoms go away in a few minutes. You cough up blood. You vomit blood or what looks like coffee grounds. You pass maroon or very bloody stools. Call your doctor now or seek immediate medical care if:  You have new bruises or blood spots under your skin. You have a nosebleed. Your gums bleed when you brush your teeth. You have blood in your urine.   Your stools are black and tarlike or have streaks of blood. You have vaginal bleeding when you are not having your period, or heavy period bleeding. You have new symptoms that may be related to your stroke, such as falls or trouble swallowing. Watch closely for changes in your health, and be sure to contact your doctor if you have any problems. Where can you learn more? Go to Toopher.be    Enter C294  in the search box to learn more about \"Stroke: After Your Visit\". © 1527-1502 [employer]. Care instructions adapted under license by [employer] (which disclaims liability or warranty for this information). This care instruction is for use with your licensed healthcare professional. If you have questions about a medical condition or this instruction, always ask your healthcare professional. [address] any warranty or liability for your use of this information. Vertigo: Care Instructions  Your Care Instructions    Vertigo is the feeling that you or your surroundings are moving when there is no actual movement. It is often described as a feeling of spinning, whirling, falling, or tilting. Vertigo may make you vomit or feel nauseated. You may have trouble standing or walking and may lose your balance. Vertigo is often related to an inner ear problem, but it can have other more serious causes. If vertigo continues, you may need more tests to find its cause. Follow-up care is a key part of your treatment and safety. Be sure to make and go to all appointments, and call your doctor if you are having problems. It's also a good idea to know your test results and keep a list of the medicines you take. How can you care for yourself at home? · Do not lie flat on your back. Prop yourself up slightly. This may reduce the spinning feeling. Keep your eyes open. · Move slowly so that you do not fall.   · If your doctor recommends medicine, take it exactly as directed. · Do not drive while you are having vertigo. Certain exercises, called Balderas-Daroff exercises, can help decrease vertigo. To do Balderas-Daroff exercises:  · Sit on the edge of a bed or sofa and quickly lie down on the side that causes the worst vertigo. Lie on your side with your ear down. · Stay in this position for at least 30 seconds or until the vertigo goes away. · Sit up. If this causes vertigo, wait for it to stop. · Repeat the procedure on the other side. · Repeat this 10 times. Do these exercises 2 times a day until the vertigo is gone. When should you call for help? Call 911 anytime you think you may need emergency care. For example, call if:    · You passed out (lost consciousness).     · You have symptoms of a stroke. These may include:  ? Sudden numbness, tingling, weakness, or loss of movement in your face, arm, or leg, especially on only one side of your body. ? Sudden vision changes. ? Sudden trouble speaking. ? Sudden confusion or trouble understanding simple statements. ? Sudden problems with walking or balance. ? A sudden, severe headache that is different from past headaches.    Call your doctor now or seek immediate medical care if:    · Vertigo occurs with a fever, a headache, or ringing in your ears.     · You have new or increased nausea and vomiting.    Watch closely for changes in your health, and be sure to contact your doctor if:    · Vertigo gets worse or happens more often.     · Vertigo has not gotten better after 2 weeks. Where can you learn more? Go to http://ward-jez.info/. Enter M549 in the search box to learn more about \"Vertigo: Care Instructions. \"  Current as of: March 28, 2018  Content Version: 11.8  © 8506-8862 [employer]. Care instructions adapted under license by [employer] (which disclaims liability or warranty for this information).  If you have questions about a medical condition or this instruction, always ask your healthcare professional. [place] [address] any warranty or liability for your use of this information. DISCHARGE SUMMARY from Nurse    PATIENT INSTRUCTIONS:    After general anesthesia or intravenous sedation, for 24 hours or while taking prescription Narcotics:  · Limit your activities  · Do not drive and operate hazardous machinery  · Do not make important personal or business decisions  · Do  not drink alcoholic beverages  · If you have not urinated within 8 hours after discharge, please contact your surgeon on call. Report the following to your surgeon:  · Excessive pain, swelling, redness or odor of or around the surgical area  · Temperature over 100.5  · Nausea and vomiting lasting longer than 4 hours or if unable to take medications  · Any signs of decreased circulation or nerve impairment to extremity: change in color, persistent  numbness, tingling, coldness or increase pain  · Any questions    What to do at Home:  Recommended activity: Activity as tolerated,     If you experience any of the following symptoms see discharg ein structions, please follow up with primary care. *  Please give a list of your current medications to your Primary Care Provider. *  Please update this list whenever your medications are discontinued, doses are      changed, or new medications (including over-the-counter products) are added. *  Please carry medication information at all times in case of emergency situations. These are general instructions for a healthy lifestyle:    No smoking/ No tobacco products/ Avoid exposure to second hand smoke  Surgeon General's Warning:  Quitting smoking now greatly reduces serious risk to your health.     Obesity, smoking, and sedentary lifestyle greatly increases your risk for illness    A healthy diet, regular physical exercise & weight monitoring are important for maintaining a healthy lifestyle    You may be retaining fluid if you have a history of heart failure or if you experience any of the following symptoms:  Weight gain of 3 pounds or more overnight or 5 pounds in a week, increased swelling in our hands or feet or shortness of breath while lying flat in bed. Please call your doctor as soon as you notice any of these symptoms; do not wait until your next office visit. Recognize signs and symptoms of STROKE:    F-face looks uneven    A-arms unable to move or move unevenly    S-speech slurred or non-existent    T-time-call 911 as soon as signs and symptoms begin-DO NOT go       Back to bed or wait to see if you get better-TIME IS BRAIN. Warning Signs of HEART ATTACK     Call 911 if you have these symptoms:   Chest discomfort. Most heart attacks involve discomfort in the center of the chest that lasts more than a few minutes, or that goes away and comes back. It can feel like uncomfortable pressure, squeezing, fullness, or pain.  Discomfort in other areas of the upper body. Symptoms can include pain or discomfort in one or both arms, the back, neck, jaw, or stomach.  Shortness of breath with or without chest discomfort.  Other signs may include breaking out in a cold sweat, nausea, or lightheadedness. Don't wait more than five minutes to call 911 - MINUTES MATTER! Fast action can save your life. Calling 911 is almost always the fastest way to get lifesaving treatment. Emergency Medical Services staff can begin treatment when they arrive -- up to an hour sooner than if someone gets to the hospital by car. The discharge information has been reviewed with the patient. The patient verbalized understanding. Discharge medications reviewed with the patient and appropriate educational materials and side effects teaching were provided.   ___________________________________________________________________________________________________________________________________

## 2018-12-05 NOTE — PROGRESS NOTES
Problem: Falls - Risk of 
Goal: *Absence of Falls Document Whitney Saucedo Fall Risk and appropriate interventions in the flowsheet. Outcome: Progressing Towards Goal 
Fall Risk Interventions: 
Mobility Interventions: Bed/chair exit alarm, Communicate number of staff needed for ambulation/transfer, Patient to call before getting OOB, Strengthening exercises (ROM-active/passive), PT Consult for assist device competence, PT Consult for mobility concerns Medication Interventions: Bed/chair exit alarm, Patient to call before getting OOB, Evaluate medications/consider consulting pharmacy Elimination Interventions: Call light in reach, Bed/chair exit alarm, Patient to call for help with toileting needs History of Falls Interventions: Bed/chair exit alarm, Consult care management for discharge planning, Evaluate medications/consider consulting pharmacy, Investigate reason for fall Problem: Pressure Injury - Risk of 
Goal: *Prevention of pressure injury Document Handy Scale and appropriate interventions in the flowsheet. Pressure Injury Interventions: Activity Interventions: Increase time out of bed, Pressure redistribution bed/mattress(bed type), PT/OT evaluation Mobility Interventions: HOB 30 degrees or less, Pressure redistribution bed/mattress (bed type), PT/OT evaluation Nutrition Interventions: Offer support with meals,snacks and hydration, Document food/fluid/supplement intake

## 2018-12-05 NOTE — PROGRESS NOTES
57 y/o admitted with right sided weakness and diagnosed with CVA. Recent open tip rhinoplasty 11/27 for airway obstruction. Completed Lovenox bridge and resumed coumadin post op. Also on ASA. Previous h/x of CVA. [place] splint removed. Appropriate nasal swelling. Internal nasal valve remains more open, but has fair amount of mucosal edema. Nylon sutures removed. Continue head elevated. No increases in intranasal pressure. Saline nasal irrigation PRN. Can f/u in the office in 2 weeks in the event of D/C. Seems rehab is likely.

## 2018-12-05 NOTE — PROGRESS NOTES
Problem: Falls - Risk of 
Goal: *Absence of Falls Document Chai Kins Fall Risk and appropriate interventions in the flowsheet. Outcome: Progressing Towards Goal 
Fall Risk Interventions: 
Mobility Interventions: Bed/chair exit alarm, OT consult for ADLs, Patient to call before getting OOB, PT Consult for mobility concerns Medication Interventions: Bed/chair exit alarm, Evaluate medications/consider consulting pharmacy, Patient to call before getting OOB Elimination Interventions: Call light in reach, Bed/chair exit alarm, Patient to call for help with toileting needs History of Falls Interventions: Bed/chair exit alarm, Door open when patient unattended, Investigate reason for fall Problem: Pressure Injury - Risk of 
Goal: *Prevention of pressure injury Document Handy Scale and appropriate interventions in the flowsheet. Outcome: Progressing Towards Goal 
Pressure Injury Interventions: Activity Interventions: Increase time out of bed, Pressure redistribution bed/mattress(bed type), PT/OT evaluation Mobility Interventions: HOB 30 degrees or less, Pressure redistribution bed/mattress (bed type), PT/OT evaluation Nutrition Interventions: Document food/fluid/supplement intake, Offer support with meals,snacks and hydration

## 2018-12-06 ENCOUNTER — HOME CARE VISIT (OUTPATIENT)
Dept: SCHEDULING | Facility: HOME HEALTH | Age: 63
End: 2018-12-06
Payer: MEDICARE

## 2018-12-06 VITALS
DIASTOLIC BLOOD PRESSURE: 70 MMHG | HEART RATE: 76 BPM | TEMPERATURE: 97.3 F | SYSTOLIC BLOOD PRESSURE: 110 MMHG | RESPIRATION RATE: 20 BRPM

## 2018-12-06 PROCEDURE — 3331090002 HH PPS REVENUE DEBIT

## 2018-12-06 PROCEDURE — G0151 HHCP-SERV OF PT,EA 15 MIN: HCPCS

## 2018-12-06 PROCEDURE — 3331090001 HH PPS REVENUE CREDIT

## 2018-12-06 PROCEDURE — 400013 HH SOC

## 2018-12-07 ENCOUNTER — HOME CARE VISIT (OUTPATIENT)
Dept: HOME HEALTH SERVICES | Facility: HOME HEALTH | Age: 63
End: 2018-12-07
Payer: MEDICARE

## 2018-12-07 ENCOUNTER — HOME CARE VISIT (OUTPATIENT)
Dept: SCHEDULING | Facility: HOME HEALTH | Age: 63
End: 2018-12-07
Payer: MEDICARE

## 2018-12-07 LAB
INR, POC: 2.5 (ref 0.7–1.2)
PROTHROMBIN TIME, POC: 0 SECONDS (ref 6.5–11.9)

## 2018-12-07 PROCEDURE — G0299 HHS/HOSPICE OF RN EA 15 MIN: HCPCS

## 2018-12-07 PROCEDURE — 3331090001 HH PPS REVENUE CREDIT

## 2018-12-07 PROCEDURE — 3331090002 HH PPS REVENUE DEBIT

## 2018-12-08 PROCEDURE — 3331090001 HH PPS REVENUE CREDIT

## 2018-12-08 PROCEDURE — 3331090002 HH PPS REVENUE DEBIT

## 2018-12-09 VITALS
TEMPERATURE: 98 F | HEART RATE: 81 BPM | DIASTOLIC BLOOD PRESSURE: 80 MMHG | SYSTOLIC BLOOD PRESSURE: 130 MMHG | OXYGEN SATURATION: 99 % | RESPIRATION RATE: 18 BRPM

## 2018-12-09 PROCEDURE — 3331090001 HH PPS REVENUE CREDIT

## 2018-12-09 PROCEDURE — 3331090002 HH PPS REVENUE DEBIT

## 2018-12-10 PROCEDURE — 3331090001 HH PPS REVENUE CREDIT

## 2018-12-10 PROCEDURE — 3331090002 HH PPS REVENUE DEBIT

## 2018-12-11 ENCOUNTER — HOME CARE VISIT (OUTPATIENT)
Dept: SCHEDULING | Facility: HOME HEALTH | Age: 63
End: 2018-12-11
Payer: MEDICARE

## 2018-12-11 VITALS
HEART RATE: 76 BPM | RESPIRATION RATE: 17 BRPM | DIASTOLIC BLOOD PRESSURE: 72 MMHG | SYSTOLIC BLOOD PRESSURE: 128 MMHG | TEMPERATURE: 98.1 F

## 2018-12-11 PROCEDURE — 3331090002 HH PPS REVENUE DEBIT

## 2018-12-11 PROCEDURE — 3331090001 HH PPS REVENUE CREDIT

## 2018-12-11 PROCEDURE — G0157 HHC PT ASSISTANT EA 15: HCPCS

## 2018-12-12 ENCOUNTER — HOME CARE VISIT (OUTPATIENT)
Dept: HOME HEALTH SERVICES | Facility: HOME HEALTH | Age: 63
End: 2018-12-12
Payer: MEDICARE

## 2018-12-12 PROCEDURE — 3331090002 HH PPS REVENUE DEBIT

## 2018-12-12 PROCEDURE — 3331090001 HH PPS REVENUE CREDIT

## 2018-12-13 PROCEDURE — 3331090002 HH PPS REVENUE DEBIT

## 2018-12-13 PROCEDURE — 3331090001 HH PPS REVENUE CREDIT

## 2018-12-14 ENCOUNTER — HOME CARE VISIT (OUTPATIENT)
Dept: SCHEDULING | Facility: HOME HEALTH | Age: 63
End: 2018-12-14
Payer: MEDICARE

## 2018-12-14 ENCOUNTER — HOME CARE VISIT (OUTPATIENT)
Dept: SCHEDULING | Facility: HOME HEALTH | Age: 63
End: 2018-12-14

## 2018-12-14 ENCOUNTER — HOME CARE VISIT (OUTPATIENT)
Dept: HOME HEALTH SERVICES | Facility: HOME HEALTH | Age: 63
End: 2018-12-14
Payer: MEDICARE

## 2018-12-14 VITALS
SYSTOLIC BLOOD PRESSURE: 128 MMHG | TEMPERATURE: 98.1 F | DIASTOLIC BLOOD PRESSURE: 84 MMHG | RESPIRATION RATE: 17 BRPM | HEART RATE: 76 BPM

## 2018-12-14 PROCEDURE — 3331090002 HH PPS REVENUE DEBIT

## 2018-12-14 PROCEDURE — G0157 HHC PT ASSISTANT EA 15: HCPCS

## 2018-12-14 PROCEDURE — 3331090001 HH PPS REVENUE CREDIT

## 2018-12-15 PROCEDURE — 3331090001 HH PPS REVENUE CREDIT

## 2018-12-15 PROCEDURE — 3331090002 HH PPS REVENUE DEBIT

## 2018-12-16 PROCEDURE — 3331090002 HH PPS REVENUE DEBIT

## 2018-12-16 PROCEDURE — 3331090001 HH PPS REVENUE CREDIT

## 2018-12-17 ENCOUNTER — HOME CARE VISIT (OUTPATIENT)
Dept: SCHEDULING | Facility: HOME HEALTH | Age: 63
End: 2018-12-17
Payer: MEDICARE

## 2018-12-17 VITALS — DIASTOLIC BLOOD PRESSURE: 88 MMHG | HEART RATE: 72 BPM | SYSTOLIC BLOOD PRESSURE: 140 MMHG | TEMPERATURE: 98.1 F

## 2018-12-17 VITALS
OXYGEN SATURATION: 97 % | DIASTOLIC BLOOD PRESSURE: 58 MMHG | TEMPERATURE: 97.9 F | RESPIRATION RATE: 18 BRPM | HEART RATE: 78 BPM | SYSTOLIC BLOOD PRESSURE: 98 MMHG

## 2018-12-17 PROCEDURE — G0157 HHC PT ASSISTANT EA 15: HCPCS

## 2018-12-17 PROCEDURE — G0299 HHS/HOSPICE OF RN EA 15 MIN: HCPCS

## 2018-12-17 PROCEDURE — 3331090001 HH PPS REVENUE CREDIT

## 2018-12-17 PROCEDURE — 3331090002 HH PPS REVENUE DEBIT

## 2018-12-18 PROCEDURE — 3331090001 HH PPS REVENUE CREDIT

## 2018-12-18 PROCEDURE — 3331090002 HH PPS REVENUE DEBIT

## 2018-12-19 PROCEDURE — 3331090002 HH PPS REVENUE DEBIT

## 2018-12-19 PROCEDURE — 3331090001 HH PPS REVENUE CREDIT

## 2018-12-20 ENCOUNTER — HOME CARE VISIT (OUTPATIENT)
Dept: SCHEDULING | Facility: HOME HEALTH | Age: 63
End: 2018-12-20
Payer: MEDICARE

## 2018-12-20 ENCOUNTER — HOME CARE VISIT (OUTPATIENT)
Dept: HOME HEALTH SERVICES | Facility: HOME HEALTH | Age: 63
End: 2018-12-20
Payer: MEDICARE

## 2018-12-20 VITALS — SYSTOLIC BLOOD PRESSURE: 122 MMHG | DIASTOLIC BLOOD PRESSURE: 80 MMHG

## 2018-12-20 VITALS
RESPIRATION RATE: 18 BRPM | HEART RATE: 74 BPM | SYSTOLIC BLOOD PRESSURE: 110 MMHG | DIASTOLIC BLOOD PRESSURE: 68 MMHG | TEMPERATURE: 98.1 F

## 2018-12-20 PROCEDURE — G0299 HHS/HOSPICE OF RN EA 15 MIN: HCPCS

## 2018-12-20 PROCEDURE — 3331090002 HH PPS REVENUE DEBIT

## 2018-12-20 PROCEDURE — G0151 HHCP-SERV OF PT,EA 15 MIN: HCPCS

## 2018-12-20 PROCEDURE — 3331090001 HH PPS REVENUE CREDIT

## 2018-12-21 PROCEDURE — 3331090002 HH PPS REVENUE DEBIT

## 2018-12-21 PROCEDURE — 3331090001 HH PPS REVENUE CREDIT

## 2018-12-22 PROCEDURE — 3331090001 HH PPS REVENUE CREDIT

## 2018-12-22 PROCEDURE — 3331090002 HH PPS REVENUE DEBIT

## 2018-12-23 PROCEDURE — 3331090002 HH PPS REVENUE DEBIT

## 2018-12-23 PROCEDURE — 3331090001 HH PPS REVENUE CREDIT

## 2018-12-24 PROCEDURE — 3331090001 HH PPS REVENUE CREDIT

## 2018-12-24 PROCEDURE — 3331090002 HH PPS REVENUE DEBIT

## 2018-12-25 PROCEDURE — 3331090001 HH PPS REVENUE CREDIT

## 2018-12-25 PROCEDURE — 3331090002 HH PPS REVENUE DEBIT

## 2018-12-26 ENCOUNTER — HOME CARE VISIT (OUTPATIENT)
Dept: SCHEDULING | Facility: HOME HEALTH | Age: 63
End: 2018-12-26
Payer: MEDICARE

## 2018-12-26 VITALS
TEMPERATURE: 98.3 F | OXYGEN SATURATION: 99 % | RESPIRATION RATE: 18 BRPM | DIASTOLIC BLOOD PRESSURE: 58 MMHG | SYSTOLIC BLOOD PRESSURE: 92 MMHG | HEART RATE: 63 BPM

## 2018-12-26 PROCEDURE — G0299 HHS/HOSPICE OF RN EA 15 MIN: HCPCS

## 2018-12-26 PROCEDURE — 3331090001 HH PPS REVENUE CREDIT

## 2018-12-26 PROCEDURE — 3331090002 HH PPS REVENUE DEBIT

## 2018-12-27 ENCOUNTER — HOME CARE VISIT (OUTPATIENT)
Dept: SCHEDULING | Facility: HOME HEALTH | Age: 63
End: 2018-12-27
Payer: MEDICARE

## 2018-12-27 VITALS
HEART RATE: 68 BPM | DIASTOLIC BLOOD PRESSURE: 70 MMHG | TEMPERATURE: 97.9 F | RESPIRATION RATE: 17 BRPM | SYSTOLIC BLOOD PRESSURE: 140 MMHG

## 2018-12-27 PROCEDURE — G0157 HHC PT ASSISTANT EA 15: HCPCS

## 2018-12-27 PROCEDURE — 3331090002 HH PPS REVENUE DEBIT

## 2018-12-27 PROCEDURE — 3331090001 HH PPS REVENUE CREDIT

## 2018-12-28 ENCOUNTER — HOME CARE VISIT (OUTPATIENT)
Dept: SCHEDULING | Facility: HOME HEALTH | Age: 63
End: 2018-12-28
Payer: MEDICARE

## 2018-12-28 ENCOUNTER — HOME CARE VISIT (OUTPATIENT)
Dept: HOME HEALTH SERVICES | Facility: HOME HEALTH | Age: 63
End: 2018-12-28
Payer: MEDICARE

## 2018-12-28 VITALS
TEMPERATURE: 97.9 F | DIASTOLIC BLOOD PRESSURE: 82 MMHG | HEART RATE: 78 BPM | OXYGEN SATURATION: 97 % | SYSTOLIC BLOOD PRESSURE: 132 MMHG | RESPIRATION RATE: 18 BRPM

## 2018-12-28 VITALS — SYSTOLIC BLOOD PRESSURE: 128 MMHG | DIASTOLIC BLOOD PRESSURE: 78 MMHG | TEMPERATURE: 98.1 F | RESPIRATION RATE: 18 BRPM

## 2018-12-28 PROCEDURE — G0157 HHC PT ASSISTANT EA 15: HCPCS

## 2018-12-28 PROCEDURE — G0299 HHS/HOSPICE OF RN EA 15 MIN: HCPCS

## 2018-12-28 PROCEDURE — 3331090001 HH PPS REVENUE CREDIT

## 2018-12-28 PROCEDURE — 3331090002 HH PPS REVENUE DEBIT

## 2018-12-29 PROCEDURE — 3331090001 HH PPS REVENUE CREDIT

## 2018-12-29 PROCEDURE — 3331090002 HH PPS REVENUE DEBIT

## 2018-12-30 PROCEDURE — 3331090002 HH PPS REVENUE DEBIT

## 2018-12-30 PROCEDURE — 3331090001 HH PPS REVENUE CREDIT

## 2018-12-31 ENCOUNTER — HOME CARE VISIT (OUTPATIENT)
Dept: SCHEDULING | Facility: HOME HEALTH | Age: 63
End: 2018-12-31
Payer: MEDICARE

## 2018-12-31 PROCEDURE — 3331090001 HH PPS REVENUE CREDIT

## 2018-12-31 PROCEDURE — 3331090002 HH PPS REVENUE DEBIT

## 2018-12-31 PROCEDURE — G0151 HHCP-SERV OF PT,EA 15 MIN: HCPCS

## 2019-01-01 VITALS
HEART RATE: 68 BPM | TEMPERATURE: 97.8 F | DIASTOLIC BLOOD PRESSURE: 80 MMHG | RESPIRATION RATE: 18 BRPM | SYSTOLIC BLOOD PRESSURE: 118 MMHG

## 2019-01-01 PROCEDURE — 3331090002 HH PPS REVENUE DEBIT

## 2019-01-01 PROCEDURE — 3331090001 HH PPS REVENUE CREDIT

## 2019-01-02 ENCOUNTER — HOME CARE VISIT (OUTPATIENT)
Dept: HOME HEALTH SERVICES | Facility: HOME HEALTH | Age: 64
End: 2019-01-02
Payer: MEDICARE

## 2019-01-02 PROCEDURE — 3331090001 HH PPS REVENUE CREDIT

## 2019-01-02 PROCEDURE — 3331090002 HH PPS REVENUE DEBIT

## 2019-01-03 PROCEDURE — 3331090002 HH PPS REVENUE DEBIT

## 2019-01-03 PROCEDURE — 3331090001 HH PPS REVENUE CREDIT

## 2019-01-04 ENCOUNTER — HOME CARE VISIT (OUTPATIENT)
Dept: SCHEDULING | Facility: HOME HEALTH | Age: 64
End: 2019-01-04
Payer: MEDICARE

## 2019-01-04 PROCEDURE — G0299 HHS/HOSPICE OF RN EA 15 MIN: HCPCS

## 2019-01-04 PROCEDURE — 3331090001 HH PPS REVENUE CREDIT

## 2019-01-04 PROCEDURE — 3331090003 HH PPS REVENUE ADJ

## 2019-01-04 PROCEDURE — 3331090002 HH PPS REVENUE DEBIT

## 2019-01-05 PROCEDURE — 3331090001 HH PPS REVENUE CREDIT

## 2019-01-05 PROCEDURE — 3331090002 HH PPS REVENUE DEBIT

## 2019-01-06 VITALS
SYSTOLIC BLOOD PRESSURE: 132 MMHG | DIASTOLIC BLOOD PRESSURE: 72 MMHG | TEMPERATURE: 97.7 F | OXYGEN SATURATION: 99 % | RESPIRATION RATE: 18 BRPM | HEART RATE: 60 BPM

## 2019-01-06 PROCEDURE — 3331090002 HH PPS REVENUE DEBIT

## 2019-01-06 PROCEDURE — 3331090001 HH PPS REVENUE CREDIT

## 2019-02-05 PROBLEM — I69.398 VERTIGO AS LATE EFFECT OF STROKE: Status: ACTIVE | Noted: 2019-02-05

## 2019-02-05 PROBLEM — R42 VERTIGO AS LATE EFFECT OF STROKE: Status: ACTIVE | Noted: 2019-02-05

## 2019-03-06 ENCOUNTER — HOSPITAL ENCOUNTER (OUTPATIENT)
Dept: MRI IMAGING | Age: 64
Discharge: HOME | End: 2019-03-06
Attending: PSYCHIATRY & NEUROLOGY
Payer: MEDICARE

## 2019-03-06 DIAGNOSIS — R42 VERTIGO AS LATE EFFECT OF STROKE: ICD-10-CM

## 2019-03-06 DIAGNOSIS — I69.398 VERTIGO AS LATE EFFECT OF STROKE: ICD-10-CM

## 2019-03-06 PROCEDURE — A9575 INJ GADOTERATE MEGLUMI 0.1ML: HCPCS | Performed by: PSYCHIATRY & NEUROLOGY

## 2019-03-06 PROCEDURE — 70553 MRI BRAIN STEM W/O & W/DYE: CPT

## 2019-03-06 PROCEDURE — 74011250636 HC RX REV CODE- 250/636: Performed by: PSYCHIATRY & NEUROLOGY

## 2019-03-06 RX ORDER — GADOTERATE MEGLUMINE 376.9 MG/ML
21 INJECTION INTRAVENOUS
Status: COMPLETED | OUTPATIENT
Start: 2019-03-06 | End: 2019-03-06

## 2019-03-06 RX ORDER — SODIUM CHLORIDE 0.9 % (FLUSH) 0.9 %
10 SYRINGE (ML) INJECTION
Status: COMPLETED | OUTPATIENT
Start: 2019-03-06 | End: 2019-03-06

## 2019-03-06 RX ADMIN — GADOTERATE MEGLUMINE 21 ML: 376.9 INJECTION INTRAVENOUS at 19:41

## 2019-03-06 RX ADMIN — Medication 10 ML: at 19:41

## 2019-04-02 ENCOUNTER — HOSPITAL ENCOUNTER (OUTPATIENT)
Dept: LAB | Age: 64
Discharge: HOME | End: 2019-04-02
Payer: MEDICARE

## 2019-04-02 DIAGNOSIS — Z86.718 HISTORY OF DVT OF LOWER EXTREMITY: ICD-10-CM

## 2019-04-02 DIAGNOSIS — I69.398 VERTIGO AS LATE EFFECT OF STROKE: ICD-10-CM

## 2019-04-02 DIAGNOSIS — R42 VERTIGO AS LATE EFFECT OF STROKE: ICD-10-CM

## 2019-04-02 LAB
APTT PPP: 52.7 SEC (ref 24.7–39.8)
CRP SERPL-MCNC: 0.8 MG/DL (ref 0–0.9)
DEPRECATED AT III PPP: 99 % (ref 87–125)
ERYTHROCYTE [SEDIMENTATION RATE] IN BLOOD: 15 MM/HR (ref 0–30)
INR PPP: 2.8
PROTHROMBIN TIME: 29 SEC (ref 11.7–14.5)

## 2019-04-02 PROCEDURE — 86430 RHEUMATOID FACTOR TEST QUAL: CPT

## 2019-04-02 PROCEDURE — 83520 IMMUNOASSAY QUANT NOS NONAB: CPT

## 2019-04-02 PROCEDURE — 86146 BETA-2 GLYCOPROTEIN ANTIBODY: CPT

## 2019-04-02 PROCEDURE — 85306 CLOT INHIBIT PROT S FREE: CPT

## 2019-04-02 PROCEDURE — 86140 C-REACTIVE PROTEIN: CPT

## 2019-04-02 PROCEDURE — 86148 ANTI-PHOSPHOLIPID ANTIBODY: CPT

## 2019-04-02 PROCEDURE — 85610 PROTHROMBIN TIME: CPT

## 2019-04-02 PROCEDURE — 83090 ASSAY OF HOMOCYSTEINE: CPT

## 2019-04-02 PROCEDURE — 36415 COLL VENOUS BLD VENIPUNCTURE: CPT

## 2019-04-02 PROCEDURE — 86038 ANTINUCLEAR ANTIBODIES: CPT

## 2019-04-02 PROCEDURE — 85730 THROMBOPLASTIN TIME PARTIAL: CPT

## 2019-04-02 PROCEDURE — 85300 ANTITHROMBIN III ACTIVITY: CPT

## 2019-04-02 PROCEDURE — 85652 RBC SED RATE AUTOMATED: CPT

## 2019-04-03 LAB
ANA SER QL: NEGATIVE
C-ANCA TITR SER IF: NORMAL TITER
HCYS SERPL-SCNC: 16.9 UMOL/L (ref 0–15)
MYELOPEROXIDASE AB SER IA-ACNC: <9 U/ML (ref 0–9)
P-ANCA ATYPICAL TITR SER IF: NORMAL TITER
P-ANCA TITR SER IF: NORMAL TITER
PROTEINASE3 AB SER IA-ACNC: <3.5 U/ML (ref 0–3.5)
RHEUMATOID FACT SER QL LA: NEGATIVE

## 2019-04-04 LAB
B2 GLYCOPROT1 IGA SER-ACNC: <9 GPI IGA UNITS (ref 0–25)
B2 GLYCOPROT1 IGG SER-ACNC: <9 GPI IGG UNITS (ref 0–20)
B2 GLYCOPROT1 IGM SER-ACNC: <9 GPI IGM UNITS (ref 0–32)

## 2019-04-07 LAB
FACTOR V LEIDEN MUTATION, XMF5LT: NORMAL %
LUPUS ANTICOAG PANEL, XMLUPT: NORMAL
PROT S FREE AG ACT/NOR PPP IA: NORMAL %
PROTHROMBIN G2021A MUTATION, XMPTMT: NORMAL

## 2019-04-08 LAB — MTHFR GENE MUT ANL BLD/T: NORMAL

## 2019-04-15 LAB
CARDIOLIPIN IGA SER IA-ACNC: <9 APL U/ML (ref 0–11)
CARDIOLIPIN IGG SER IA-ACNC: <9 GPL U/ML (ref 0–14)
CARDIOLIPIN IGM SER IA-ACNC: <9 MPL U/ML (ref 0–12)
PE AB, IGA, 823219: 0.8 U/ML
PE AB, IGG, 823220: 0.5 U/ML
PE AB, IGM, 823221: 3.5 U/ML
PHOSPHATIDYLGLYCEROL IGA, 823222: 1.2 U/ML
PHOSPHATIDYLGLYCEROL IGG, 823223: 1.7 U/ML
PHOSPHATIDYLGLYCEROL IGM, 823224: 1.5 U/ML
PHOSPHATIDYLINOSITOL IGA, 823216: 1.6 U/ML
PHOSPHATIDYLINOSITOL IGG, 823217: 3.3 U/ML
PHOSPHATIDYLINOSITOL IGM, 823218: 2.9 U/ML
PS IGA SER-ACNC: 1 APS IGA (ref 0–20)
PS IGG SER-ACNC: 2 GPS IGG (ref 0–11)
PS IGM SER-ACNC: 16 MPS IGM (ref 0–25)

## 2019-05-29 ENCOUNTER — HOSPITAL ENCOUNTER (OUTPATIENT)
Dept: GENERAL RADIOLOGY | Age: 64
Discharge: HOME | End: 2019-05-29
Payer: MEDICARE

## 2019-05-29 DIAGNOSIS — S90.32XA CONTUSION OF LEFT FOOT, INITIAL ENCOUNTER: ICD-10-CM

## 2019-05-29 PROCEDURE — 73630 X-RAY EXAM OF FOOT: CPT

## 2019-05-31 NOTE — PROGRESS NOTES
Please call and advise x-ray is negative for fracture.   Rec she return for f/u and additional treatment Tiara Valencia MD

## 2019-07-08 ENCOUNTER — HOSPITAL ENCOUNTER (OUTPATIENT)
Dept: SURGERY | Age: 64
Discharge: HOME | End: 2019-07-08

## 2019-07-10 VITALS — BODY MASS INDEX: 43.05 KG/M2 | HEIGHT: 61 IN | WEIGHT: 228 LBS

## 2019-07-10 NOTE — PERIOP NOTES
Patient verified name and . Order for consent not found in EHR  Type 1B surgery, PAT phone assessment complete. Orders not received. Labs per surgeon: none  Labs per anesthesia protocol: PT/INR DOS  Order placed in connect care to be drawn in preop DOS       Patient answered medical/surgical history questions at their best of ability. All prior to admission medications documented in Connect Care. Patient instructed to take the following medications the day of surgery according to anesthesia guidelines with a small sip of water: Atorvastatin, Klonopin,Gabapentin, Omeprazole, Zanaflex (prn) . Hold all vitamins 7 days prior to surgery and NSAIDS 5 days prior to surgery. Prescription meds to hold: Coumadin    Patient stated she is stopping Coumadin 6mg 07/10/2019 and 2019 and will start taking Lovenox 30mg BID on ,  and  with last dose 12 hours prior to surgery. Patient had PT/INR drawn 2019 with results of INR 4.1 and PTT 39.2. Has contacted her PCP Dr. Diana Stauffer and obtained prescription for Lovenox 30mg. Patient instructed on the following:  Arrive at A Entrance, time of arrival to be called the day before by 1700  NPO after midnight including gum, mints, and ice chips  Responsible adult must drive patient to the hospital, stay during surgery, and patient will need supervision 24 hours after anesthesia  Use antibacterial soap in shower the night before surgery and on the morning of surgery  All piercings must be removed prior to arrival.    Leave all valuables (money and jewelry) at home but bring insurance card and ID on       DOS. Do not wear make-up, nail polish, lotions, cologne, perfumes, powders, or oil on skin. Patient teach back successful and patient demonstrates knowledge of instruction.

## 2019-07-12 ENCOUNTER — ANESTHESIA EVENT (OUTPATIENT)
Dept: SURGERY | Age: 64
End: 2019-07-12
Payer: MEDICARE

## 2019-07-15 ENCOUNTER — HOSPITAL ENCOUNTER (OUTPATIENT)
Age: 64
Setting detail: OUTPATIENT SURGERY
Discharge: HOME | End: 2019-07-15
Attending: OTOLARYNGOLOGY | Admitting: OTOLARYNGOLOGY
Payer: MEDICARE

## 2019-07-15 ENCOUNTER — ANESTHESIA (OUTPATIENT)
Dept: SURGERY | Age: 64
End: 2019-07-15
Payer: MEDICARE

## 2019-07-15 VITALS
DIASTOLIC BLOOD PRESSURE: 70 MMHG | TEMPERATURE: 96.9 F | BODY MASS INDEX: 44.04 KG/M2 | HEART RATE: 68 BPM | SYSTOLIC BLOOD PRESSURE: 131 MMHG | OXYGEN SATURATION: 94 % | WEIGHT: 233.06 LBS | RESPIRATION RATE: 16 BRPM

## 2019-07-15 DIAGNOSIS — G89.18 PAIN AT SURGICAL SITE: Primary | ICD-10-CM

## 2019-07-15 LAB
INR BLD: 1 (ref 0.9–1.2)
PT BLD: 12.2 SECS (ref 9.6–11.6)

## 2019-07-15 PROCEDURE — 74011000250 HC RX REV CODE- 250: Performed by: ANESTHESIOLOGY

## 2019-07-15 PROCEDURE — 74011250637 HC RX REV CODE- 250/637: Performed by: OTOLARYNGOLOGY

## 2019-07-15 PROCEDURE — 74011250636 HC RX REV CODE- 250/636: Performed by: ANESTHESIOLOGY

## 2019-07-15 PROCEDURE — 76060000034 HC ANESTHESIA 1.5 TO 2 HR: Performed by: OTOLARYNGOLOGY

## 2019-07-15 PROCEDURE — 76010000162 HC OR TIME 1.5 TO 2 HR INTENSV-TIER 1: Performed by: OTOLARYNGOLOGY

## 2019-07-15 PROCEDURE — 77030028843 HC ELECTRD CAUT TIP MEGA -B: Performed by: OTOLARYNGOLOGY

## 2019-07-15 PROCEDURE — 77030002916 HC SUT ETHLN J&J -A: Performed by: OTOLARYNGOLOGY

## 2019-07-15 PROCEDURE — 74011000250 HC RX REV CODE- 250: Performed by: OTOLARYNGOLOGY

## 2019-07-15 PROCEDURE — 74011250637 HC RX REV CODE- 250/637: Performed by: ANESTHESIOLOGY

## 2019-07-15 PROCEDURE — 77030032490 HC SLV COMPR SCD KNE COVD -B: Performed by: OTOLARYNGOLOGY

## 2019-07-15 PROCEDURE — 74011250636 HC RX REV CODE- 250/636

## 2019-07-15 PROCEDURE — 77030016570 HC BLNKT BAIR HGGR 3M -B: Performed by: ANESTHESIOLOGY

## 2019-07-15 PROCEDURE — C1713 ANCHOR/SCREW BN/BN,TIS/BN: HCPCS | Performed by: OTOLARYNGOLOGY

## 2019-07-15 PROCEDURE — 77030039425 HC BLD LARYNG TRULITE DISP TELE -A: Performed by: ANESTHESIOLOGY

## 2019-07-15 PROCEDURE — 76210000020 HC REC RM PH II FIRST 0.5 HR: Performed by: OTOLARYNGOLOGY

## 2019-07-15 PROCEDURE — 77030018836 HC SOL IRR NACL ICUM -A: Performed by: OTOLARYNGOLOGY

## 2019-07-15 PROCEDURE — 85610 PROTHROMBIN TIME: CPT

## 2019-07-15 PROCEDURE — C1887 CATHETER, GUIDING: HCPCS | Performed by: OTOLARYNGOLOGY

## 2019-07-15 PROCEDURE — 76210000016 HC OR PH I REC 1 TO 1.5 HR: Performed by: OTOLARYNGOLOGY

## 2019-07-15 PROCEDURE — 74011000250 HC RX REV CODE- 250

## 2019-07-15 PROCEDURE — 77030002888 HC SUT CHRMC J&J -A: Performed by: OTOLARYNGOLOGY

## 2019-07-15 PROCEDURE — 77030037088 HC TUBE ENDOTRACH ORAL NSL COVD-A: Performed by: ANESTHESIOLOGY

## 2019-07-15 PROCEDURE — 77030036727 HC DEV INFL BLN SNUS SE DISP ACCL -B: Performed by: OTOLARYNGOLOGY

## 2019-07-15 DEVICE — IMPLANTABLE DEVICE: Type: IMPLANTABLE DEVICE | Site: NOSE | Status: FUNCTIONAL

## 2019-07-15 RX ORDER — LIDOCAINE HYDROCHLORIDE 20 MG/ML
INJECTION, SOLUTION EPIDURAL; INFILTRATION; INTRACAUDAL; PERINEURAL AS NEEDED
Status: DISCONTINUED | OUTPATIENT
Start: 2019-07-15 | End: 2019-07-15 | Stop reason: HOSPADM

## 2019-07-15 RX ORDER — LIDOCAINE HYDROCHLORIDE AND EPINEPHRINE 10; 10 MG/ML; UG/ML
INJECTION, SOLUTION INFILTRATION; PERINEURAL AS NEEDED
Status: DISCONTINUED | OUTPATIENT
Start: 2019-07-15 | End: 2019-07-15 | Stop reason: HOSPADM

## 2019-07-15 RX ORDER — HYDROCODONE BITARTRATE AND ACETAMINOPHEN 5; 325 MG/1; MG/1
1 TABLET ORAL
Qty: 40 TAB | Refills: 0 | Status: SHIPPED | OUTPATIENT
Start: 2019-07-15 | End: 2019-07-18

## 2019-07-15 RX ORDER — NEOSTIGMINE METHYLSULFATE 1 MG/ML
INJECTION INTRAVENOUS AS NEEDED
Status: DISCONTINUED | OUTPATIENT
Start: 2019-07-15 | End: 2019-07-15 | Stop reason: HOSPADM

## 2019-07-15 RX ORDER — EPHEDRINE SULFATE 50 MG/ML
INJECTION, SOLUTION INTRAVENOUS AS NEEDED
Status: DISCONTINUED | OUTPATIENT
Start: 2019-07-15 | End: 2019-07-15 | Stop reason: HOSPADM

## 2019-07-15 RX ORDER — DEXAMETHASONE SODIUM PHOSPHATE 4 MG/ML
INJECTION, SOLUTION INTRA-ARTICULAR; INTRALESIONAL; INTRAMUSCULAR; INTRAVENOUS; SOFT TISSUE AS NEEDED
Status: DISCONTINUED | OUTPATIENT
Start: 2019-07-15 | End: 2019-07-15 | Stop reason: HOSPADM

## 2019-07-15 RX ORDER — GLYCOPYRROLATE 0.2 MG/ML
INJECTION INTRAMUSCULAR; INTRAVENOUS AS NEEDED
Status: DISCONTINUED | OUTPATIENT
Start: 2019-07-15 | End: 2019-07-15 | Stop reason: HOSPADM

## 2019-07-15 RX ORDER — ROCURONIUM BROMIDE 10 MG/ML
INJECTION, SOLUTION INTRAVENOUS AS NEEDED
Status: DISCONTINUED | OUTPATIENT
Start: 2019-07-15 | End: 2019-07-15 | Stop reason: HOSPADM

## 2019-07-15 RX ORDER — SODIUM CHLORIDE, SODIUM LACTATE, POTASSIUM CHLORIDE, CALCIUM CHLORIDE 600; 310; 30; 20 MG/100ML; MG/100ML; MG/100ML; MG/100ML
100 INJECTION, SOLUTION INTRAVENOUS CONTINUOUS
Status: DISCONTINUED | OUTPATIENT
Start: 2019-07-15 | End: 2019-07-15 | Stop reason: HOSPADM

## 2019-07-15 RX ORDER — ONDANSETRON 2 MG/ML
INJECTION INTRAMUSCULAR; INTRAVENOUS AS NEEDED
Status: DISCONTINUED | OUTPATIENT
Start: 2019-07-15 | End: 2019-07-15 | Stop reason: HOSPADM

## 2019-07-15 RX ORDER — FENTANYL CITRATE 50 UG/ML
INJECTION, SOLUTION INTRAMUSCULAR; INTRAVENOUS AS NEEDED
Status: DISCONTINUED | OUTPATIENT
Start: 2019-07-15 | End: 2019-07-15 | Stop reason: HOSPADM

## 2019-07-15 RX ORDER — PROPOFOL 10 MG/ML
INJECTION, EMULSION INTRAVENOUS AS NEEDED
Status: DISCONTINUED | OUTPATIENT
Start: 2019-07-15 | End: 2019-07-15 | Stop reason: HOSPADM

## 2019-07-15 RX ORDER — OXYCODONE HYDROCHLORIDE 5 MG/1
10 TABLET ORAL
Status: DISCONTINUED | OUTPATIENT
Start: 2019-07-15 | End: 2019-07-15 | Stop reason: HOSPADM

## 2019-07-15 RX ORDER — LIDOCAINE HYDROCHLORIDE 10 MG/ML
0.3 INJECTION INFILTRATION; PERINEURAL ONCE
Status: COMPLETED | OUTPATIENT
Start: 2019-07-15 | End: 2019-07-15

## 2019-07-15 RX ORDER — ACETAMINOPHEN 10 MG/ML
INJECTION, SOLUTION INTRAVENOUS AS NEEDED
Status: DISCONTINUED | OUTPATIENT
Start: 2019-07-15 | End: 2019-07-15 | Stop reason: HOSPADM

## 2019-07-15 RX ORDER — SUCCINYLCHOLINE CHLORIDE 20 MG/ML
INJECTION INTRAMUSCULAR; INTRAVENOUS AS NEEDED
Status: DISCONTINUED | OUTPATIENT
Start: 2019-07-15 | End: 2019-07-15 | Stop reason: HOSPADM

## 2019-07-15 RX ORDER — SCOLOPAMINE TRANSDERMAL SYSTEM 1 MG/1
1 PATCH, EXTENDED RELEASE TRANSDERMAL
Status: DISCONTINUED | OUTPATIENT
Start: 2019-07-15 | End: 2019-07-15 | Stop reason: HOSPADM

## 2019-07-15 RX ORDER — MIDAZOLAM HYDROCHLORIDE 1 MG/ML
2 INJECTION, SOLUTION INTRAMUSCULAR; INTRAVENOUS
Status: COMPLETED | OUTPATIENT
Start: 2019-07-15 | End: 2019-07-15

## 2019-07-15 RX ORDER — HYDROMORPHONE HYDROCHLORIDE 2 MG/ML
0.5 INJECTION, SOLUTION INTRAMUSCULAR; INTRAVENOUS; SUBCUTANEOUS
Status: DISCONTINUED | OUTPATIENT
Start: 2019-07-15 | End: 2019-07-15 | Stop reason: HOSPADM

## 2019-07-15 RX ORDER — OXYMETAZOLINE HCL 0.05 %
SPRAY, NON-AEROSOL (ML) NASAL AS NEEDED
Status: DISCONTINUED | OUTPATIENT
Start: 2019-07-15 | End: 2019-07-15 | Stop reason: HOSPADM

## 2019-07-15 RX ADMIN — HYDROMORPHONE HYDROCHLORIDE 0.5 MG: 2 INJECTION INTRAMUSCULAR; INTRAVENOUS; SUBCUTANEOUS at 10:00

## 2019-07-15 RX ADMIN — PROMETHAZINE HYDROCHLORIDE 3 MG: 25 INJECTION INTRAMUSCULAR; INTRAVENOUS at 09:35

## 2019-07-15 RX ADMIN — SUCCINYLCHOLINE CHLORIDE 180 MG: 20 INJECTION INTRAMUSCULAR; INTRAVENOUS at 07:40

## 2019-07-15 RX ADMIN — EPHEDRINE SULFATE 10 MG: 50 INJECTION, SOLUTION INTRAVENOUS at 07:43

## 2019-07-15 RX ADMIN — ROCURONIUM BROMIDE 10 MG: 10 INJECTION, SOLUTION INTRAVENOUS at 07:40

## 2019-07-15 RX ADMIN — ONDANSETRON 4 MG: 2 INJECTION INTRAMUSCULAR; INTRAVENOUS at 07:56

## 2019-07-15 RX ADMIN — LIDOCAINE HYDROCHLORIDE 100 MG: 20 INJECTION, SOLUTION EPIDURAL; INFILTRATION; INTRACAUDAL; PERINEURAL at 07:40

## 2019-07-15 RX ADMIN — EPHEDRINE SULFATE 10 MG: 50 INJECTION, SOLUTION INTRAVENOUS at 07:49

## 2019-07-15 RX ADMIN — LIDOCAINE HYDROCHLORIDE 0.3 ML: 10 INJECTION, SOLUTION INFILTRATION; PERINEURAL at 06:00

## 2019-07-15 RX ADMIN — DEXAMETHASONE SODIUM PHOSPHATE 4 MG: 4 INJECTION, SOLUTION INTRA-ARTICULAR; INTRALESIONAL; INTRAMUSCULAR; INTRAVENOUS; SOFT TISSUE at 07:56

## 2019-07-15 RX ADMIN — PROPOFOL 200 MG: 10 INJECTION, EMULSION INTRAVENOUS at 07:40

## 2019-07-15 RX ADMIN — MIDAZOLAM 2 MG: 1 INJECTION INTRAMUSCULAR; INTRAVENOUS at 06:59

## 2019-07-15 RX ADMIN — HYDROMORPHONE HYDROCHLORIDE 0.5 MG: 2 INJECTION INTRAMUSCULAR; INTRAVENOUS; SUBCUTANEOUS at 09:45

## 2019-07-15 RX ADMIN — NEOSTIGMINE METHYLSULFATE 3 MG: 1 INJECTION INTRAVENOUS at 09:08

## 2019-07-15 RX ADMIN — SODIUM CHLORIDE, SODIUM LACTATE, POTASSIUM CHLORIDE, AND CALCIUM CHLORIDE: 600; 310; 30; 20 INJECTION, SOLUTION INTRAVENOUS at 08:37

## 2019-07-15 RX ADMIN — ACETAMINOPHEN 1000 MG: 10 INJECTION, SOLUTION INTRAVENOUS at 09:05

## 2019-07-15 RX ADMIN — SODIUM CHLORIDE, SODIUM LACTATE, POTASSIUM CHLORIDE, AND CALCIUM CHLORIDE 100 ML/HR: 600; 310; 30; 20 INJECTION, SOLUTION INTRAVENOUS at 06:17

## 2019-07-15 RX ADMIN — FENTANYL CITRATE 100 MCG: 50 INJECTION, SOLUTION INTRAMUSCULAR; INTRAVENOUS at 07:40

## 2019-07-15 RX ADMIN — GLYCOPYRROLATE 0.4 MG: 0.2 INJECTION INTRAMUSCULAR; INTRAVENOUS at 09:08

## 2019-07-15 RX ADMIN — ROCURONIUM BROMIDE 20 MG: 10 INJECTION, SOLUTION INTRAVENOUS at 07:49

## 2019-07-15 NOTE — BRIEF OP NOTE
BRIEF OPERATIVE NOTE    Date of Procedure: 7/15/2019   Preoperative Diagnosis:   Nasal obstruction    Postoperative Diagnosis:   Nasal obstruction    Procedure(s):  Alar base widening    Surgeon(s) and Role:  Panel 1:     José Miguel Batista DO - Primary  Panel 2:     Sharda Steele MD - Primary         Surgical Staff:  Circ-1: Norma Diaz RN  Scrub Tech-1: Ameena SIN  Scrub Tech-2: Isaiah Bravo  Event Time In Time Out   Incision Start 0800    Incision Close 0905      Anesthesia: General   Estimated Blood Loss: 10 ml  Specimens: None  Findings: Narrow internal nasal valve  Complications: None immediate   Implants:   Implant Name Type Inv.  Item Serial No.  Lot No. LRB No. Used Action   SCR CRTX MF ST FLUT 1.3X5MM TI -- PK/5 - [id number]  SCR CRTX MF ST FLUT 1.3X5MM TI -- PK/5 [id number] [facility] [id number] N/A 2 Implanted

## 2019-07-15 NOTE — DISCHARGE INSTRUCTIONS
Physician Specific Discharge Instructions:    - Keep head elevated when sleeping  - Peridex mouthwash 4 times a day and after you eat or drink anything    Endoscopic Sinus Surgery: What to Expect at [address] [place] [address] will have a drip pad under your nose to collect mucus and blood. Change it only when it bleeds through. You may have to do this every hour for 24 hours after surgery. You may have some swelling of your nose, upper lip, cheeks, or around your eyes. Your nose may be sore and will bleed. You may feel \"stuffed up\" like you have a bad head cold. This will last for several days after surgery. The tip of your nose and your upper lip and gums may be numb. Feeling will return in a few weeks to a few months. Your sense of smell will not be as good after surgery. It will improve and probably return to normal in 1 to 2 months. You will probably be able to return to work or school in about 1 week and to your normal routine in about 3 weeks. However, this varies with your job and the extent of your surgery. Most people feel normal in 1 to 2 months. You will have to visit your doctor regularly for 3 to 4 months after your surgery. Your doctor will check to see that your sinuses are healing well. This care sheet gives you a general idea about how long it will take for you to recover. But each person recovers at a different pace. Follow the steps below to get better as quickly as possible. How can you care for yourself at home? Activity    · Rest when you feel tired. Getting enough sleep will help you recover. Do not lie flat. Raise your head with three or four pillows. This can reduce swelling. Try to sleep on your back during the month after surgery. You can also sleep in a reclining chair.     · Try to walk each day. Start by walking a little more than you did the day before. Bit by bit, increase the amount you walk. Walking boosts blood flow and helps prevent pneumonia and constipation.  Also, try to sit and stand as much as you can.     · For 1 week, try not to bend over or lift anything heavier than 10 pounds. This may include a child, heavy grocery bags and milk containers, a heavy briefcase or backpack, cat litter or dog food bags, or a vacuum .     · You can take a shower or bath. Use lukewarm, not hot water. Avoid swimming for 6 weeks.     · Avoid sawdust, chemicals, and excessive dust for 4 weeks.     · Avoid strenuous activities, such as biking, jogging, weight lifting, or aerobic exercise, for 1 week and then ease back into these activities over 2 to 3 weeks.     · You may drive when you are no longer taking prescription pain medicine and feel up to it.     · You will probably be able to return to work or school in about 1 week and to your normal routine in about 3 weeks. However, this varies with your job and the extent of your surgery. Diet    · You can eat your normal diet. If your stomach is upset, try bland, low-fat foods like plain rice, broiled chicken, toast, and yogurt.     · You may notice that your bowel movements are not regular right after your surgery. This is common. Try to avoid constipation and straining with bowel movements. You may want to take a fiber supplement every day. If you have not had a bowel movement after a couple of days, ask your doctor about taking a mild laxative. Medicines    · Your doctor will tell you if and when you can restart your medicines. He or she will also give you instructions about taking any new medicines.     · If you take blood thinners, such as warfarin (Coumadin), clopidogrel (Plavix), or aspirin, be sure to talk to your doctor. He or she will tell you if and when to start taking those medicines again.  Make sure that you understand exactly what your doctor wants you to do.     · Do not take aspirin, aspirin-containing medicines, or anti-inflammatory medicines such as ibuprofen (Advil, Motrin) or naproxen (Aleve) for 3 weeks following surgery unless your doctor says it is okay.     · Take pain medicines exactly as directed. ? If the doctor gave you a prescription medicine for pain, take it as prescribed. ? Do not take two or more pain medicines at the same time unless the doctor told you to. Many pain medicines have acetaminophen, which is Tylenol. Too much acetaminophen (Tylenol) can be harmful.     · If your doctor prescribed antibiotics, take them as directed. Do not stop taking them just because you feel better. You need to take the full course of antibiotics.     · If you think your pain medicine is making you sick to your stomach:  ? Take your medicine after meals (unless your doctor has told you not to). ? Ask your doctor for a different pain medicine. Incision care    · You probably will not have an incision (cut). You will have a drip pad under your nose to collect blood. Change it only when it has bled through. You may have to do this every hour for 24 hours after surgery. When bleeding stops, you can remove it.     · If you have packing in your nose, leave it in. Your doctor will take it out. Ice and elevation    · To help with swelling and pain, put ice or a cold pack on your nose for 10 to 20 minutes at a time. Put a thin cloth between the ice and your skin.     · Do not sleep flat. Sleep with your head raised up. You can also sleep in a reclining chair. Other instructions    · Do not blow your nose for 2 weeks.     · Do not put anything into your nose.     · If you must sneeze, open your mouth and sneeze naturally.     · Keep your mouth clean. Rinse your mouth with salt water or an alcohol-free mouthwash after each meal and before bedtime.     · After any packing is removed, use saline (saltwater) nasal washes to help keep your nasal passages open and wash out mucus and bacteria. You can buy saline nose drops at a grocery store or drugstore.     · You can wear your glasses when you wish.  Do not wear contacts until the day after the surgery.     · Do not travel by airplane for at least 2 weeks. Your sinuses are still healing, and the changes in air pressure can affect them. Follow-up care is a key part of your treatment and safety. Be sure to make and go to all appointments, and call your doctor if you are having problems. It's also a good idea to know your test results and keep a list of the medicines you take. When should you call for help? Call 911 anytime you think you may need emergency care. For example, call if:    · You passed out (lost consciousness).     · You have severe trouble breathing.     · You have chest pain, have shortness of breath, or you cough up blood.    Call your doctor now or seek immediate medical care if:    · You have signs of infection, such as:  ? Increased pain, swelling, warmth, or redness. ? Red streaks leading from the incision. ? Pus draining from the incision. ? A fever.     · You bleed through the bandage.     · You have symptoms of a blood clot in your leg (called a deep vein thrombosis), such as:  ? Pain in the calf, back of the knee, thigh, or groin. ? Redness and swelling in your leg or groin.     · You have pain that does not get better after you take pain medicine.    Watch closely for changes in your health, and be sure to contact your doctor if:    · You do not get better as expected. Where can you learn more? Go to http://ward-jez.info/. Enter X057 in the search box to learn more about \"Endoscopic Sinus Surgery: What to Expect at Home. \"  Current as of: March 27, 2018  Content Version: 11.9  © 3852-5729 [employer]. Care instructions adapted under license by [employer] (which disclaims liability or warranty for this information). If you have questions about a medical condition or this instruction, always ask your healthcare professional. [address] any warranty or liability for your use of this information.

## 2019-07-15 NOTE — ANESTHESIA PREPROCEDURE EVALUATION
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 Cardiovascular    Hypertension              Exercise tolerance: >4 METS  Comments: History of DVT on coumadin   GI/Hepatic/Renal     GERD           Endo/Other        Morbid obesity and arthritis     Other Findings              Physical Exam    Airway  Mallampati: I  TM Distance: 4 - 6 cm  Neck ROM: normal range of motion   Mouth opening: Normal     Cardiovascular  Regular rate and rhythm,  S1 and S2 normal,  no murmur, click, rub, or gallop             Dental  No notable dental hx       Pulmonary  Breath sounds clear to auscultation               Abdominal  GI exam deferred       Other Findings            Anesthetic Plan    ASA: 3  Anesthesia type: general          Induction: Intravenous  Anesthetic plan and risks discussed with: Patient      Pt has been told she is difficult to intubate and she describes a \"turn\" in her trachea. She was easily intubated by DL per the record for the last 3 surgeries at [facility] over the last 3 yrs.

## 2019-07-15 NOTE — OP NOTES
[address] [address]  OPERATIVE REPORT    Name:  Francisca Raza  MR#:  [id number]  :  1955  ACCOUNT #:  [de-identified]  DATE OF SERVICE:  07/15/2019    PREOPERATIVE DIAGNOSIS:  Chronic sinusitis. POSTOPERATIVE DIAGNOSIS:  Chronic sinusitis. PROCEDURE PERFORMED:  Right maxillary balloon sinuplasty with removal of contents. SURGEON:  Hany Anderson. DO Andrea    ASSISTANT:  None. ANESTHESIA:  General.    COMPLICATIONS:  None. SPECIMENS REMOVED:  none. IMPLANTS:  none. ESTIMATED BLOOD LOSS:  1 to 2 mL. HISTORY:  This is a 59-year-old female, she was very well known to me. She has been seeing me for several years mainly because of sinus and nasal issues. She has undergone multiple procedures. She has a naturally very small nose, which was giving her a very small airway. I have done again multiple procedures and although they seemed to work temporarily, nothing has given her a lasting relief. So, I did send her to Dr. Rachel Polanco with plastic surgery and he was going to do a widening of the nasal ala. Now, she started complaining about facial pressure and again, I have done a balloon sinuplasty in the past.  So, I did check a CT scan, which showed problems in the right maxillary sinus consistent with her complaints of pressure and congestion mainly on the right side. So, I did recommend that she undergo a right maxillary balloon sinuplasty with possible biopsy. Procedure, risks and benefits were discussed with her in the office, all questions were answered, she was agreeable with the surgery and we did coordinate this with Dr. Rachel Polanco who did his portion of the procedure after I was done with my portion of procedure today. SURGERY DETAILS:  The patient was identified in the preoperative waiting area and she was taken back to the operating room where she underwent general anesthesia. Approximately 1.5 mL of 1% lidocaine with epinephrine was injected into the middle turbinates bilaterally. Afrin-soaked pledgets were placed inside the nose and left there for a few minutes. A 0-degree scope was then used. Once the pledgets were removed, I evaluated the nose. Septum was in the midline. Turbinates appeared to be small. I medialized the middle turbinates bilaterally. There was no sign of any issues on the left side. I went back over to the right, medialized the middle turbinate, medialized the uncinate process using the ball probe and I was able to feed the guidewire and probed for the balloon sinuplasty and to the right maxillary sinus. The balloon was inflated to a pressure of 12. Deeply the sinus was irrigated using 120 mL of saline. Reinspection revealed just some thickened mucosa just inside the sinus opening, which was removed and the sinus appeared to be open and clear. My portion of the surgery was completed and so then I turned it over to Dr. Nuha Naylor for his portion of the surgery.         Lalita Zaidi DO      [id number]  D:  07/15/2019 9:52  T:  07/15/2019 12:35  JOB #:  [id number]

## 2019-07-15 NOTE — ANESTHESIA POSTPROCEDURE EVALUATION
Procedure(s):  RIGHT MAXILLARY BALLOON SINUPLASTY WITH REMOVAL OF CONTENTS  ALAR BASE WIDENING.     general    Anesthesia Post Evaluation      Multimodal analgesia: multimodal analgesia used between 6 hours prior to anesthesia start to PACU discharge  Patient location during evaluation: PACU  Patient participation: complete - patient participated  Level of consciousness: awake  Pain management: adequate  Airway patency: patent  Anesthetic complications: no  Cardiovascular status: acceptable  Respiratory status: acceptable  Hydration status: acceptable  Post anesthesia nausea and vomiting:  none      Vitals Value Taken Time   /61 7/15/2019 10:05 AM   Temp 36.1 °C (96.9 °F) 7/15/2019  9:18 AM   Pulse 83 7/15/2019 10:05 AM   Resp 16 7/15/2019 10:05 AM   SpO2 96 % 7/15/2019 10:05 AM

## 2019-07-16 NOTE — OP NOTES
OPERATIVE NOTE    Date of Procedure: 7/15/2019   Preoperative Diagnosis:   Nasal obstruction    Postoperative Diagnosis:   Nasal obstruction    Procedure(s):  Alar base widening    Surgeon(s) and Role:  Panel 1:     Leslye Lane DO - Primary  Panel 2:     * India Kelley MD - Primary    Prior to the procedure the patient was met in holding. Once again a discussion of the risks, benefits and alternatives was conducted including but not limited to bleeding, infection, failure of the surgery, need for further procedures, disappointing or unacceptable cosmesis, damage to adjacent structures was conducted. The patient again affirmed understanding and consent. The patient was marked in the upright and relaxed position. Patient brought to the OR, surgical timeout performed and anesthesia induced. Dr. Valentina Barlow completed his portion of the procedure. A 0 degree scope was used to visualize the internal and external nasal valve. Under direct visualization with the nasendoscope lateral tension applied to the alar base with resultant increase in the internal and external nasal valve aperture. She was noted to be more narrow on the left than the right. The upper gingivobuccal sulcus was infiltrated with 10 mls of 1% lidocaine with epi. 10 minutes allowed to elapse. Attention turned to the right side. Mucosa incised sharply and dissection carried down to medial maxillary buttress. Using previously visualized vector of pull a single 5 mm self tapping screw placed and alar base secured with 3-0 nylon. Tension applied to pull right alar base laterally with resultant increase in cross sectional area of external and internal valve. Suture tied down. Wound irrigated. Mucosa closed with interrupted and running 3-0 chromic. Mirror procedure performed on the left.             Surgical Staff:  Circ-1: Ish Carrasquillo RN  Scrub Tech-1: Christina SIN  Scrub Tech-2: Tricia Antony  Event Time In Time Out   Incision Start 0800    Incision Close 0905      Anesthesia: General   Estimated Blood Loss: 10 ml  Specimens: None  Findings: Narrow internal nasal valve  Complications: None immediate   Implants:   Implant Name Type Inv.  Item Serial No.  Lot No. LRB No. Used Action   SCR CRTX MF ST FLUT 1.3X5MM TI -- PK/5 - [id number]  SCR CRTX MF ST FLUT 1.3X5MM TI -- PK/5 [id number] [facility] [id number] N/A 2 Implanted

## 2019-07-18 NOTE — H&P
CC: Nasal obstruction    HPI: 59 yo previous history of nasal obstruction, multiply recurrent. Has undergone  grafts with revision. Past Medical History:   Diagnosis Date    Adverse effect of anesthesia     delayed awakening    Anticoagulated on Coumadin     Anxiety     Arthritis     OA     Bleeding from nipple in female     Breast lump     left    Chronic pain     shoulders and knees    CKD (chronic kidney disease) 11/13/2015    resolved per patient    Current use of long term anticoagulation     pt takes coumadin due to hx of CVA    Difficult intubation     per patient \"I was told my trachea takes a funny turn and need a shorter tube. 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  History of DVT of lower extremity     1973 (left leg)     2007 (right leg, following knee surgery)    HTN (hypertension)     controlled with lisinopril    Hx of gout     managed with medication     Hyperlipidemia     no medication     Insomnia     Left ear hearing loss 11/11/2016    Mitral valve prolapse     last echo ?2004 at [facility] Morbid obesity ([facility] [address] 11/13/15    BMI 44.9    MVP (mitral valve prolapse)     Pinched nerve in neck     9/2016    Post-operative nausea and vomiting     pt reports severe N/V : request e-mend or scopolamine patch; pt reports hoarseness, cough s/p intubation    PUD (peptic ulcer disease) 1980's    Right shoulder injury     Reported injury while shopping 11/23/18    Thromboembolus ([facility] [place] [address] 2009    after knee surgery    Torn rotator cuff     bilateral repairs.  DO NOT PULL PT WITH HER ARMS OR SHOULDERS    Unspecified adverse effect of anesthesia 1983    delayed awakening with D&C    Vitamin D deficiency           Past Surgical History:   Procedure Laterality Date    HX APPENDECTOMY      HX BREAST BIOPSY Left 11/20/2015    BREAST NEEDLE LOCALIZED LUMPECTOMY LEFT / PREOP @1000/ BREAST CENTER @1130 FOR LEFT US NEEDLE LOC/ SURGERY @2450 performed by Jarvis Joseph MD at [address] [facility] [address] HX COLONOSCOPY  2015    polyps; pt states pre-cancerous    HX DILATION AND CURETTAGE  1982, 1999, 2003    X3    HX DILATION AND CURETTAGE  2017    HX GYN  2017    cervical polyp removal    HX HEENT Bilateral at age 1    muscle & balances sx    HX HEENT  11/2017    resec of inferior turbinates & balloon sinuplasty    HX HEENT  1957 & 2011    eye surgery    HX HEENT  11/27/2018    tip rhino plasty    HX HYSTERECTOMY  2018    robotic hysterectomy, BSO    HX ORTHOPAEDIC Right     knee sx X2; torn meniscus & acl torn     HX SEPTOPLASTY  1972    HX TONSILLECTOMY      SHOULDER SURG PROC UNLISTED Bilateral last ones 2006 & 2007    \"multiple\"; last shoulder sx pt states came in for left shoulder repair; pt states while being moved from stretcher to OR table operating employee pulled on Right arm while pt telling her to stop & pt states right rotator cuff was torn     A&O x3  RRR  Resp clear  Narrow internal and external nasal valve. Narrow choana. Airway improved with brandy. A/P:  Will plan for widening of the alar base reproducing brandy maneuver with screw fixation.

## 2019-10-03 ENCOUNTER — HOSPITAL ENCOUNTER (OUTPATIENT)
Dept: GENERAL RADIOLOGY | Age: 64
Discharge: HOME | End: 2019-10-03
Attending: FAMILY MEDICINE
Payer: MEDICARE

## 2019-10-03 DIAGNOSIS — R06.02 SOB (SHORTNESS OF BREATH): ICD-10-CM

## 2019-10-03 DIAGNOSIS — R07.81 RIB PAIN ON LEFT SIDE: ICD-10-CM

## 2019-10-03 PROCEDURE — 71046 X-RAY EXAM CHEST 2 VIEWS: CPT

## 2019-10-03 PROCEDURE — 71100 X-RAY EXAM RIBS UNI 2 VIEWS: CPT

## 2019-10-03 NOTE — PROGRESS NOTES
Dear Ms. Reinoso    Your recent CXR is normal    Impression    IMPRESSION: No acute disease.     Thanks,  Dr. Margueritte Denver

## 2019-10-22 ENCOUNTER — HOSPITAL ENCOUNTER (OUTPATIENT)
Dept: ULTRASOUND IMAGING | Age: 64
Discharge: HOME | End: 2019-10-22
Attending: FAMILY MEDICINE
Payer: MEDICARE

## 2019-10-22 DIAGNOSIS — M79.604 RIGHT LEG PAIN: ICD-10-CM

## 2019-10-22 DIAGNOSIS — L72.9 CYST OF SKIN: ICD-10-CM

## 2019-10-22 PROCEDURE — 76881 US COMPL JOINT R-T W/IMG: CPT

## 2019-10-22 NOTE — PROGRESS NOTES
Patient notifed of results. But patient is concerned that she has lump in her vein considering she is on blood thinner.   She would like additional testing or a referral to a specialist

## 2019-10-22 NOTE — PROGRESS NOTES
Dear Ms. Reinoso    Your recent right leg ultrasound showed: Impression    FINDINGS / IMPRESSION: No popliteal cyst. A few prominent patent venous varicosities in the area of palpable abnormality.     Thanks,  Dr. Loc Tenorio

## 2019-10-23 PROBLEM — I87.2 VENOUS INSUFFICIENCY OF RIGHT LEG: Status: ACTIVE | Noted: 2019-10-23

## 2020-02-03 PROBLEM — N18.32 CHRONIC KIDNEY DISEASE (CKD) STAGE G3B/A1, MODERATELY DECREASED GLOMERULAR FILTRATION RATE (GFR) BETWEEN 30-44 ML/MIN/1.73 SQUARE METER AND ALBUMINURIA CREATININE RATIO LESS THAN 30 MG/G (HCC): Status: ACTIVE | Noted: 2020-02-03

## 2020-11-16 ENCOUNTER — HOSPITAL ENCOUNTER (OUTPATIENT)
Dept: SURGERY | Age: 65
Discharge: HOME | End: 2020-11-16

## 2020-11-17 VITALS — WEIGHT: 247 LBS | BODY MASS INDEX: 46.63 KG/M2 | HEIGHT: 61 IN

## 2020-11-17 DIAGNOSIS — J34.89 NASAL OBSTRUCTION: Primary | ICD-10-CM

## 2020-11-17 DIAGNOSIS — J34.3 HYPERTROPHY OF NASAL TURBINATES: ICD-10-CM

## 2020-11-17 RX ORDER — ENOXAPARIN SODIUM 100 MG/ML
30 INJECTION SUBCUTANEOUS DAILY
COMMUNITY

## 2020-11-17 NOTE — PERIOP NOTES
Patient verified name and . Order for consent was found in EHR and matches case posting; patient verifies procedure. Type 1b surgery, PAT phone  assessment complete. Orders not received. Labs per surgeon: none  Labs per anesthesia protocol: None      Patient answered medical/surgical history questions at their best of ability. All prior to admission medications documented in [facility]. Patient instructed to take the following medications the day of surgery according to anesthesia guidelines with a small sip of water: Allopurinol, Atorvastatin L=Klon: Hold all vitamins 7 days prior to surgery and NSAIDS 5 days prior to surgery. Prescription meds to hold:Coumadin per Dr. Ismael Gibson instruction. Patient states has always done Lovenox bridge and trying to get in touch with Dr. Jerel Daniels concerning stopping Coumadin and starting lovenox. Left message with Morgan Mcknight at Dr. Ismael Gibson office to call patient to clarify holding Coumadin    Patient instructed on the following:    > a negative Covid swab result is required to proceed with surgery;  appointments are made by the surgeon office and test should be collected 7 days prior to surgery. The testing center is located at the [address] [place] [address].   > 1 visitor allowed at this time. >Arrive at [facility], time of arrival to be called the day before by 1700  >NPO after midnight including gum, mints, and ice chips  >Responsible adult must drive patient to the hospital, stay during surgery, and patient will need supervision 24 hours after anesthesia  >Use antibacterial soap in shower the night before surgery and on the morning of surgery  >All piercings must be removed prior to arrival.    >Leave all valuables (money and jewelry) at home but bring insurance card and ID on       DOS. >Do not wear make-up, nail polish, lotions, cologne, perfumes, powders, or oil on skin.

## 2020-11-22 ENCOUNTER — ANESTHESIA EVENT (OUTPATIENT)
Dept: SURGERY | Age: 65
End: 2020-11-22
Payer: MEDICARE

## 2020-11-23 ENCOUNTER — HOSPITAL ENCOUNTER (OUTPATIENT)
Age: 65
Setting detail: OUTPATIENT SURGERY
Discharge: HOME | End: 2020-11-23
Attending: OTOLARYNGOLOGY | Admitting: OTOLARYNGOLOGY
Payer: MEDICARE

## 2020-11-23 ENCOUNTER — ANESTHESIA (OUTPATIENT)
Dept: SURGERY | Age: 65
End: 2020-11-23
Payer: MEDICARE

## 2020-11-23 VITALS
OXYGEN SATURATION: 94 % | WEIGHT: 249 LBS | RESPIRATION RATE: 18 BRPM | SYSTOLIC BLOOD PRESSURE: 182 MMHG | DIASTOLIC BLOOD PRESSURE: 80 MMHG | HEART RATE: 90 BPM | HEIGHT: 61 IN | TEMPERATURE: 98 F | BODY MASS INDEX: 47.01 KG/M2

## 2020-11-23 DIAGNOSIS — J34.89 NASAL OBSTRUCTION: ICD-10-CM

## 2020-11-23 DIAGNOSIS — J34.3 HYPERTROPHY OF NASAL TURBINATES: ICD-10-CM

## 2020-11-23 LAB
INR BLD: 1 (ref 0.9–1.2)
PT BLD: 12.5 SECS (ref 9.6–11.6)

## 2020-11-23 PROCEDURE — 77030037088 HC TUBE ENDOTRACH ORAL NSL COVD-A: Performed by: ANESTHESIOLOGY

## 2020-11-23 PROCEDURE — 74011250636 HC RX REV CODE- 250/636: Performed by: NURSE ANESTHETIST, CERTIFIED REGISTERED

## 2020-11-23 PROCEDURE — 74011000250 HC RX REV CODE- 250: Performed by: OTOLARYNGOLOGY

## 2020-11-23 PROCEDURE — 74011250637 HC RX REV CODE- 250/637: Performed by: ANESTHESIOLOGY

## 2020-11-23 PROCEDURE — 74011250636 HC RX REV CODE- 250/636: Performed by: STUDENT IN AN ORGANIZED HEALTH CARE EDUCATION/TRAINING PROGRAM

## 2020-11-23 PROCEDURE — 74011000250 HC RX REV CODE- 250: Performed by: NURSE ANESTHETIST, CERTIFIED REGISTERED

## 2020-11-23 PROCEDURE — 76210000020 HC REC RM PH II FIRST 0.5 HR: Performed by: OTOLARYNGOLOGY

## 2020-11-23 PROCEDURE — 76060000032 HC ANESTHESIA 0.5 TO 1 HR: Performed by: OTOLARYNGOLOGY

## 2020-11-23 PROCEDURE — 74011000250 HC RX REV CODE- 250: Performed by: STUDENT IN AN ORGANIZED HEALTH CARE EDUCATION/TRAINING PROGRAM

## 2020-11-23 PROCEDURE — 77030039425 HC BLD LARYNG TRULITE DISP TELE -A: Performed by: ANESTHESIOLOGY

## 2020-11-23 PROCEDURE — 74011250636 HC RX REV CODE- 250/636: Performed by: ANESTHESIOLOGY

## 2020-11-23 PROCEDURE — 2709999900 HC NON-CHARGEABLE SUPPLY: Performed by: OTOLARYNGOLOGY

## 2020-11-23 PROCEDURE — 77030018390 HC SPNG HEMSTAT2 J&J -B: Performed by: OTOLARYNGOLOGY

## 2020-11-23 PROCEDURE — 74011250637 HC RX REV CODE- 250/637: Performed by: OTOLARYNGOLOGY

## 2020-11-23 PROCEDURE — 76210000016 HC OR PH I REC 1 TO 1.5 HR: Performed by: OTOLARYNGOLOGY

## 2020-11-23 PROCEDURE — 85610 PROTHROMBIN TIME: CPT

## 2020-11-23 PROCEDURE — 77030006907 HC BLD SNUS SHV MEDT -C: Performed by: OTOLARYNGOLOGY

## 2020-11-23 PROCEDURE — 76010000138 HC OR TIME 0.5 TO 1 HR: Performed by: OTOLARYNGOLOGY

## 2020-11-23 RX ORDER — GLYCOPYRROLATE 0.2 MG/ML
INJECTION INTRAMUSCULAR; INTRAVENOUS AS NEEDED
Status: DISCONTINUED | OUTPATIENT
Start: 2020-11-23 | End: 2020-11-23 | Stop reason: HOSPADM

## 2020-11-23 RX ORDER — LIDOCAINE HYDROCHLORIDE 20 MG/ML
INJECTION, SOLUTION EPIDURAL; INFILTRATION; INTRACAUDAL; PERINEURAL AS NEEDED
Status: DISCONTINUED | OUTPATIENT
Start: 2020-11-23 | End: 2020-11-23 | Stop reason: HOSPADM

## 2020-11-23 RX ORDER — ONDANSETRON 2 MG/ML
INJECTION INTRAMUSCULAR; INTRAVENOUS AS NEEDED
Status: DISCONTINUED | OUTPATIENT
Start: 2020-11-23 | End: 2020-11-23 | Stop reason: HOSPADM

## 2020-11-23 RX ORDER — SODIUM CHLORIDE, SODIUM LACTATE, POTASSIUM CHLORIDE, CALCIUM CHLORIDE 600; 310; 30; 20 MG/100ML; MG/100ML; MG/100ML; MG/100ML
100 INJECTION, SOLUTION INTRAVENOUS CONTINUOUS
Status: DISCONTINUED | OUTPATIENT
Start: 2020-11-23 | End: 2020-11-23 | Stop reason: HOSPADM

## 2020-11-23 RX ORDER — EPHEDRINE SULFATE/0.9% NACL/PF 50 MG/5 ML
SYRINGE (ML) INTRAVENOUS AS NEEDED
Status: DISCONTINUED | OUTPATIENT
Start: 2020-11-23 | End: 2020-11-23 | Stop reason: HOSPADM

## 2020-11-23 RX ORDER — NALOXONE HYDROCHLORIDE 0.4 MG/ML
0.1 INJECTION, SOLUTION INTRAMUSCULAR; INTRAVENOUS; SUBCUTANEOUS AS NEEDED
Status: DISCONTINUED | OUTPATIENT
Start: 2020-11-23 | End: 2020-11-23 | Stop reason: HOSPADM

## 2020-11-23 RX ORDER — PROPOFOL 10 MG/ML
INJECTION, EMULSION INTRAVENOUS AS NEEDED
Status: DISCONTINUED | OUTPATIENT
Start: 2020-11-23 | End: 2020-11-23 | Stop reason: HOSPADM

## 2020-11-23 RX ORDER — MIDAZOLAM HYDROCHLORIDE 1 MG/ML
2 INJECTION, SOLUTION INTRAMUSCULAR; INTRAVENOUS
Status: COMPLETED | OUTPATIENT
Start: 2020-11-23 | End: 2020-11-23

## 2020-11-23 RX ORDER — SODIUM CHLORIDE 0.9 % (FLUSH) 0.9 %
5-40 SYRINGE (ML) INJECTION EVERY 8 HOURS
Status: DISCONTINUED | OUTPATIENT
Start: 2020-11-23 | End: 2020-11-23 | Stop reason: HOSPADM

## 2020-11-23 RX ORDER — DEXAMETHASONE SODIUM PHOSPHATE 4 MG/ML
INJECTION, SOLUTION INTRA-ARTICULAR; INTRALESIONAL; INTRAMUSCULAR; INTRAVENOUS; SOFT TISSUE AS NEEDED
Status: DISCONTINUED | OUTPATIENT
Start: 2020-11-23 | End: 2020-11-23 | Stop reason: HOSPADM

## 2020-11-23 RX ORDER — HYDROMORPHONE HYDROCHLORIDE 2 MG/ML
0.5 INJECTION, SOLUTION INTRAMUSCULAR; INTRAVENOUS; SUBCUTANEOUS
Status: COMPLETED | OUTPATIENT
Start: 2020-11-23 | End: 2020-11-23

## 2020-11-23 RX ORDER — ROCURONIUM BROMIDE 10 MG/ML
INJECTION, SOLUTION INTRAVENOUS AS NEEDED
Status: DISCONTINUED | OUTPATIENT
Start: 2020-11-23 | End: 2020-11-23 | Stop reason: HOSPADM

## 2020-11-23 RX ORDER — SUCCINYLCHOLINE CHLORIDE 20 MG/ML
INJECTION INTRAMUSCULAR; INTRAVENOUS AS NEEDED
Status: DISCONTINUED | OUTPATIENT
Start: 2020-11-23 | End: 2020-11-23 | Stop reason: HOSPADM

## 2020-11-23 RX ORDER — SODIUM CHLORIDE 0.9 % (FLUSH) 0.9 %
5-40 SYRINGE (ML) INJECTION AS NEEDED
Status: DISCONTINUED | OUTPATIENT
Start: 2020-11-23 | End: 2020-11-23 | Stop reason: HOSPADM

## 2020-11-23 RX ORDER — FENTANYL CITRATE 50 UG/ML
INJECTION, SOLUTION INTRAMUSCULAR; INTRAVENOUS AS NEEDED
Status: DISCONTINUED | OUTPATIENT
Start: 2020-11-23 | End: 2020-11-23 | Stop reason: HOSPADM

## 2020-11-23 RX ORDER — DIPHENHYDRAMINE HYDROCHLORIDE 50 MG/ML
12.5 INJECTION, SOLUTION INTRAMUSCULAR; INTRAVENOUS
Status: COMPLETED | OUTPATIENT
Start: 2020-11-23 | End: 2020-11-23

## 2020-11-23 RX ORDER — LIDOCAINE HYDROCHLORIDE AND EPINEPHRINE 10; 10 MG/ML; UG/ML
INJECTION, SOLUTION INFILTRATION; PERINEURAL AS NEEDED
Status: DISCONTINUED | OUTPATIENT
Start: 2020-11-23 | End: 2020-11-23 | Stop reason: HOSPADM

## 2020-11-23 RX ORDER — LIDOCAINE HYDROCHLORIDE 10 MG/ML
0.1 INJECTION INFILTRATION; PERINEURAL AS NEEDED
Status: DISCONTINUED | OUTPATIENT
Start: 2020-11-23 | End: 2020-11-23 | Stop reason: HOSPADM

## 2020-11-23 RX ORDER — OXYCODONE HYDROCHLORIDE 5 MG/1
5 TABLET ORAL AS NEEDED
Status: COMPLETED | OUTPATIENT
Start: 2020-11-23 | End: 2020-11-23

## 2020-11-23 RX ORDER — FLUMAZENIL 0.1 MG/ML
0.2 INJECTION INTRAVENOUS
Status: DISCONTINUED | OUTPATIENT
Start: 2020-11-23 | End: 2020-11-23 | Stop reason: HOSPADM

## 2020-11-23 RX ORDER — OXYMETAZOLINE HCL 0.05 %
SPRAY, NON-AEROSOL (ML) NASAL AS NEEDED
Status: DISCONTINUED | OUTPATIENT
Start: 2020-11-23 | End: 2020-11-23 | Stop reason: HOSPADM

## 2020-11-23 RX ADMIN — PROPOFOL 50 MG: 10 INJECTION, EMULSION INTRAVENOUS at 16:32

## 2020-11-23 RX ADMIN — SUCCINYLCHOLINE CHLORIDE 140 MG: 20 INJECTION, SOLUTION INTRAMUSCULAR; INTRAVENOUS at 16:27

## 2020-11-23 RX ADMIN — PROMETHAZINE HYDROCHLORIDE 6.25 MG: 25 INJECTION INTRAMUSCULAR; INTRAVENOUS at 17:00

## 2020-11-23 RX ADMIN — MIDAZOLAM 2 MG: 1 INJECTION INTRAMUSCULAR; INTRAVENOUS at 15:47

## 2020-11-23 RX ADMIN — ROCURONIUM BROMIDE 10 MG: 10 INJECTION, SOLUTION INTRAVENOUS at 16:26

## 2020-11-23 RX ADMIN — SODIUM CHLORIDE, SODIUM LACTATE, POTASSIUM CHLORIDE, AND CALCIUM CHLORIDE 100 ML/HR: 600; 310; 30; 20 INJECTION, SOLUTION INTRAVENOUS at 14:27

## 2020-11-23 RX ADMIN — OXYCODONE HYDROCHLORIDE 5 MG: 5 TABLET ORAL at 18:09

## 2020-11-23 RX ADMIN — ONDANSETRON 4 MG: 2 INJECTION INTRAMUSCULAR; INTRAVENOUS at 16:45

## 2020-11-23 RX ADMIN — PROPOFOL 150 MG: 10 INJECTION, EMULSION INTRAVENOUS at 16:26

## 2020-11-23 RX ADMIN — DIPHENHYDRAMINE HYDROCHLORIDE 12.5 MG: 50 INJECTION, SOLUTION INTRAMUSCULAR; INTRAVENOUS at 17:00

## 2020-11-23 RX ADMIN — HYDROMORPHONE HYDROCHLORIDE 0.5 MG: 2 INJECTION INTRAMUSCULAR; INTRAVENOUS; SUBCUTANEOUS at 17:05

## 2020-11-23 RX ADMIN — SODIUM CHLORIDE, SODIUM LACTATE, POTASSIUM CHLORIDE, AND CALCIUM CHLORIDE 100 ML/HR: 600; 310; 30; 20 INJECTION, SOLUTION INTRAVENOUS at 17:07

## 2020-11-23 RX ADMIN — SODIUM CHLORIDE, SODIUM LACTATE, POTASSIUM CHLORIDE, AND CALCIUM CHLORIDE: 600; 310; 30; 20 INJECTION, SOLUTION INTRAVENOUS at 16:20

## 2020-11-23 RX ADMIN — HYDROMORPHONE HYDROCHLORIDE 0.5 MG: 2 INJECTION INTRAMUSCULAR; INTRAVENOUS; SUBCUTANEOUS at 17:25

## 2020-11-23 RX ADMIN — PROMETHAZINE HYDROCHLORIDE 6.25 MG: 25 INJECTION INTRAMUSCULAR; INTRAVENOUS at 17:29

## 2020-11-23 RX ADMIN — GLYCOPYRROLATE 0.1 MG: 0.2 INJECTION, SOLUTION INTRAMUSCULAR; INTRAVENOUS at 16:44

## 2020-11-23 RX ADMIN — FENTANYL CITRATE 100 MCG: 50 INJECTION INTRAMUSCULAR; INTRAVENOUS at 16:26

## 2020-11-23 RX ADMIN — LIDOCAINE HYDROCHLORIDE 100 MG: 20 INJECTION, SOLUTION EPIDURAL; INFILTRATION; INTRACAUDAL; PERINEURAL at 16:26

## 2020-11-23 RX ADMIN — Medication 5 MG: at 16:40

## 2020-11-23 RX ADMIN — LIDOCAINE HYDROCHLORIDE 0.1 ML: 10 INJECTION, SOLUTION INFILTRATION; PERINEURAL at 14:27

## 2020-11-23 RX ADMIN — Medication 5 MG: at 16:41

## 2020-11-23 RX ADMIN — DEXAMETHASONE SODIUM PHOSPHATE 10 MG: 4 INJECTION, SOLUTION INTRAMUSCULAR; INTRAVENOUS at 16:45

## 2020-11-23 RX ADMIN — HYDROMORPHONE HYDROCHLORIDE 0.5 MG: 2 INJECTION INTRAMUSCULAR; INTRAVENOUS; SUBCUTANEOUS at 17:15

## 2020-11-23 RX ADMIN — DIPHENHYDRAMINE HYDROCHLORIDE 12.5 MG: 50 INJECTION, SOLUTION INTRAMUSCULAR; INTRAVENOUS at 17:56

## 2020-11-23 RX ADMIN — PROPOFOL 70 MG: 10 INJECTION, EMULSION INTRAVENOUS at 16:34

## 2020-11-23 RX ADMIN — HYDROMORPHONE HYDROCHLORIDE 0.5 MG: 2 INJECTION INTRAMUSCULAR; INTRAVENOUS; SUBCUTANEOUS at 17:35

## 2020-11-23 NOTE — DISCHARGE INSTRUCTIONS
Things to Remember After Surgery    1. Sleep and rest with head elevated on several pillows to decrease swelling and pressure. 2. You will have a 2 X 2 gauze under your nose to absorb the bloody discharge you will have the first 2 to 3 days after surgery. If you saturate the gauze more than 3 times in 30 minutes, please notify the office. 3. Numbness to the front teeth after nasal surgery is normal. The feeling usually returns in 6 to 8 weeks. 4. Clean the end of your nose with hydrogen peroxide. Do not try to clean inside. It may cause bleeding. 5. Do not blow your nose until you have had your first post-op appointment ( at least one week after surgery). 6. For a mild pain and a low-grade temperature, you may take Tylenol. No aspirin, Motrin, Advil, or Aleve for at least 3 weeks after nasal surgery. 7. If the nasal surgery requires an exterior cast and the cast falls off, please notify the office during office hours. 8. Nasal congestion after surgery is normal and will resolve after the splints and/or packing are removed. ACTIVITY   Bathe or shower as directed by your doctor.  Avoid strenuous work and becoming overheated. Activity as directed by your doctor   Avoid bending over. DIET   Clear, cool liquids the first day; then soft diet the second day   Advance to regular diet on third day, unless your doctor orders otherwise.  No milk products or foods with red color dyes   If nausea and vomiting continues, call your doctor. PAIN   Take pain medication as directed by your doctor.  Call your doctor if pain is NOT relieved by medication.  DO NOT take aspirin or blood thinners until directed by your doctor. FOLLOW-UP PHONE [address] will be made by nursing staff   If you have any problems, call your doctor as needed. CALL YOUR DOCTOR IF   Bleeding is expected the first few days and should gradually clear.    Temperature of 10 1°F or above   Green or yellow discharge from nose   Stiff neck, changes in vision or mental status, confusion, or excessive drowsiness    After general anesthesia or intravenous sedation, for 24 hours or while taking prescription Narcotics:  · Limit your activities  · A responsible adult needs to be with you for the next 24 hours  · Do not drive and operate hazardous machinery  · Do not make important personal or business decisions  · Do not drink alcoholic beverages  · If you have not urinated within 8 hours after discharge, please contact your surgeon on call. · If you have sleep apnea and have a CPAP machine, please use it for all naps and sleeping. · Please use caution when taking narcotics and any of your home medications that may cause drowsiness. *  Please give a list of your current medications to your Primary Care Provider. *  Please update this list whenever your medications are discontinued, doses are      changed, or new medications (including over-the-counter products) are added. *  Please carry medication information at all times in case of emergency situations. These are general instructions for a healthy lifestyle:  No smoking/ No tobacco products/ Avoid exposure to second hand smoke  Surgeon General's Warning:  Quitting smoking now greatly reduces serious risk to your health. Obesity, smoking, and sedentary lifestyle greatly increases your risk for illness  A healthy diet, regular physical exercise & weight monitoring are important for maintaining a healthy lifestyle    You may be retaining fluid if you have a history of heart failure or if you experience any of the following symptoms:  Weight gain of 3 pounds or more overnight or 5 pounds in a week, increased swelling in our hands or feet or shortness of breath while lying flat in bed. Please call your doctor as soon as you notice any of these symptoms; do not wait until your next office visit.

## 2020-11-23 NOTE — ANESTHESIA POSTPROCEDURE EVALUATION
Procedure(s):  TURBINATE REDUCTION.     general    Anesthesia Post Evaluation      Multimodal analgesia: multimodal analgesia used between 6 hours prior to anesthesia start to PACU discharge  Patient location during evaluation: PACU  Patient participation: complete - patient participated  Level of consciousness: awake  Pain management: adequate  Airway patency: patent  Anesthetic complications: no  Cardiovascular status: acceptable  Respiratory status: spontaneous ventilation and acceptable  Hydration status: acceptable  Post anesthesia nausea and vomiting:  none      INITIAL Post-op Vital signs:   Vitals Value Taken Time   /80 11/23/2020  6:10 PM   Temp 36.7 °C (98 °F) 11/23/2020  4:56 PM   Pulse 90 11/23/2020  6:10 PM   Resp 18 11/23/2020  6:10 PM   SpO2 94 % 11/23/2020  6:10 PM

## 2020-11-23 NOTE — ANESTHESIA PREPROCEDURE EVALUATION
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 Cardiovascular    Hypertension              Exercise tolerance: >4 METS  Comments: History of DVT on coumadin   GI/Hepatic/Renal     GERD           Endo/Other        Morbid obesity and arthritis     Other Findings              Physical Exam    Airway  Mallampati: II  TM Distance: 4 - 6 cm  Neck ROM: normal range of motion   Mouth opening: Normal     Cardiovascular  Regular rate and rhythm,  S1 and S2 normal,  no murmur, click, rub, or gallop             Dental  No notable dental hx       Pulmonary  Breath sounds clear to auscultation               Abdominal  GI exam deferred       Other Findings            Anesthetic Plan    ASA: 3  Anesthesia type: general          Induction: Intravenous  Anesthetic plan and risks discussed with: Patient      Pt has been told she is difficult to intubate and she describes a \"turn\" in her trachea.   She was easily intubated by DL per the record for the last 3 surgeries at [facility].

## 2020-11-24 NOTE — OP NOTES
[place]  OPERATIVE REPORT    Name:  Mahesh Hills  MR#:  [id number]  :  1955  ACCOUNT #:  [de-identified]  DATE OF SERVICE:  2020    PREOPERATIVE DIAGNOSES:  Nasal obstruction, inferior turbinate hypertrophy. POSTOPERATIVE DIAGNOSES:  Nasal obstruction, inferior turbinate hypertrophy. PROCEDURE PERFORMED:  Bilateral submucosal resection of the inferior turbinates. SURGEON:  Thelbert Sandifer, DO    ASSISTANT:  None. ANESTHESIA:  General.    COMPLICATIONS:  None. SPECIMENS REMOVED:  none. IMPLANTS:  none. ESTIMATED BLOOD LOSS:  Less than 10 mL. HISTORY:  This is a 70-year-old female. She has been seeing me for the past couple of years because of nasal obstruction. I have done a nasal valve repair. I have done a septoplasty. I have done multiple turbinate reductions. She has followed up with Dr. Dell Montanez with Plastic Surgery and she has had nasal reconstruction surgery and she has times when she is able to breathe and times when she is not. She has a lot of postsurgical changes but overall has a decently open airway, but she is very sensitive to airway flow changes. She is developing just a little bit of swelling of the head of the inferior turbinates bilaterally; otherwise, the rest of the nose appears open and stable. I did talk to her about her options including reduction in the office. She wished to proceed with a turbinate reduction in the operating room. Procedure, risks and benefits were discussed with her in the office. All questions were answered and she was agreeable to the surgery. SURGERY DETAILS:  The patient was identified in the preoperative waiting area and then taken back to the operating room, where she underwent general anesthesia. Approximately 2 mL of 1% lidocaine with epinephrine were injected into the inferior turbinates. Afrin-soaked pledgets were placed in each side of the nose and left there for a few minutes.   These were then removed. I was able to make a small stab incision in the anterior portion of the inferior turbinates. Winter Darter was used to create a small pocket between the bone of the inferior turbinate and the mucosa medially going back approximately between 2 and 3 cm. A microdebrider with a 2 mm blade on it was used to reduce the prominent bone and tissue of the inferior turbinates bilaterally and then a Boies elevator was used to medialize and lateralize the inferior turbinates giving the patient a widely patent nasal airway. This did seem to remove the obstruction that was causing some of the turbulent airflow, and at this point, the nose appears to be open as maximally as it can be. Some blood was suctioned from the nose and nasopharynx. Fibrillar packing was placed along the floor of the nose between the inferior turbinate and the septum to help hold the turbinates in a more lateral position and then the patient was awakened and taken to the postop recovery room in a stable condition.       Shakeel Mohan DO      [id number]  D:  11/23/2020 16:50  T:  11/24/2020 3:32  JOB #:  [id number]

## 2021-04-05 ENCOUNTER — HOSPITAL ENCOUNTER (OUTPATIENT)
Dept: LAB | Age: 66
Discharge: HOME | End: 2021-04-05

## 2021-04-05 PROCEDURE — 88305 TISSUE EXAM BY PATHOLOGIST: CPT

## 2022-03-18 PROBLEM — I69.398 VERTIGO AS LATE EFFECT OF STROKE: Status: ACTIVE | Noted: 2019-02-05

## 2022-03-18 PROBLEM — R42 VERTIGO AS LATE EFFECT OF STROKE: Status: ACTIVE | Noted: 2019-02-05

## 2022-03-18 PROBLEM — N95.0 POSTMENOPAUSAL BLEEDING: Status: ACTIVE | Noted: 2017-06-27

## 2022-03-19 PROBLEM — N85.8 UTERINE MASS: Status: ACTIVE | Noted: 2017-08-16

## 2022-03-19 PROBLEM — R10.2 PELVIC PAIN IN FEMALE: Status: ACTIVE | Noted: 2017-07-24

## 2022-03-19 PROBLEM — D25.9 LEIOMYOMA OF BODY OF UTERUS: Status: ACTIVE | Noted: 2018-02-27

## 2022-03-19 PROBLEM — N18.32 CHRONIC KIDNEY DISEASE (CKD) STAGE G3B/A1, MODERATELY DECREASED GLOMERULAR FILTRATION RATE (GFR) BETWEEN 30-44 ML/MIN/1.73 SQUARE METER AND ALBUMINURIA CREATININE RATIO LESS THAN 30 MG/G (HCC): Status: ACTIVE | Noted: 2020-02-03

## 2022-03-19 PROBLEM — I87.2 VENOUS INSUFFICIENCY OF RIGHT LEG: Status: ACTIVE | Noted: 2019-10-23

## 2022-03-19 PROBLEM — Z09 POSTOPERATIVE EXAMINATION: Status: ACTIVE | Noted: 2017-08-16

## 2022-03-19 PROBLEM — E66.01 OBESITY, MORBID (HCC): Status: ACTIVE | Noted: 2017-12-01

## 2022-03-20 PROBLEM — Z01.419 WOMEN'S ANNUAL ROUTINE GYNECOLOGICAL EXAMINATION: Status: ACTIVE | Noted: 2017-06-27

## 2022-07-29 ENCOUNTER — HOSPITAL ENCOUNTER (OUTPATIENT)
Dept: CT IMAGING | Age: 67
Discharge: HOME | End: 2022-08-01
Payer: MEDICARE

## 2022-07-29 DIAGNOSIS — R10.9 ABDOMINAL PAIN, UNSPECIFIED ABDOMINAL LOCATION: ICD-10-CM

## 2022-07-29 DIAGNOSIS — R19.7 DIARRHEA, UNSPECIFIED TYPE: ICD-10-CM

## 2022-07-29 DIAGNOSIS — R11.2 NAUSEA AND VOMITING, INTRACTABILITY OF VOMITING NOT SPECIFIED, UNSPECIFIED VOMITING TYPE: ICD-10-CM

## 2022-07-29 DIAGNOSIS — Z87.19 PERSONAL HISTORY OF DISEASE OF DIGESTIVE SYSTEM: ICD-10-CM

## 2022-07-29 LAB — CREAT BLD-MCNC: 1.09 MG/DL (ref 0.8–1.5)

## 2022-07-29 PROCEDURE — 74177 CT ABD & PELVIS W/CONTRAST: CPT

## 2022-07-29 PROCEDURE — 82565 ASSAY OF CREATININE: CPT

## 2022-07-29 PROCEDURE — 2580000003 HC RX 258: Performed by: PHYSICIAN ASSISTANT

## 2022-07-29 PROCEDURE — 6360000004 HC RX CONTRAST MEDICATION: Performed by: PHYSICIAN ASSISTANT

## 2022-07-29 RX ORDER — 0.9 % SODIUM CHLORIDE 0.9 %
100 INTRAVENOUS SOLUTION INTRAVENOUS
Status: COMPLETED | OUTPATIENT
Start: 2022-07-29 | End: 2022-07-29

## 2022-07-29 RX ORDER — SODIUM CHLORIDE 0.9 % (FLUSH) 0.9 %
10 SYRINGE (ML) INJECTION
Status: DISCONTINUED | OUTPATIENT
Start: 2022-07-29 | End: 2022-08-02 | Stop reason: HOSPADM

## 2022-07-29 RX ADMIN — DIATRIZOATE MEGLUMINE AND DIATRIZOATE SODIUM 15 ML: 660; 100 LIQUID ORAL; RECTAL at 11:21

## 2022-07-29 RX ADMIN — SODIUM CHLORIDE 100 ML: 900 INJECTION, SOLUTION INTRAVENOUS at 11:20

## 2022-07-29 RX ADMIN — IOPAMIDOL 100 ML: 755 INJECTION, SOLUTION INTRAVENOUS at 11:21

## 2023-05-19 ENCOUNTER — INITIAL CONSULT (OUTPATIENT)
Age: 68
End: 2023-05-19

## 2023-05-19 VITALS — SYSTOLIC BLOOD PRESSURE: 136 MMHG | DIASTOLIC BLOOD PRESSURE: 90 MMHG | WEIGHT: 219 LBS | HEART RATE: 88 BPM

## 2023-05-19 DIAGNOSIS — Z01.818 PRE-OP EXAM: ICD-10-CM

## 2023-05-19 DIAGNOSIS — I10 PRIMARY HYPERTENSION: Primary | ICD-10-CM

## 2023-05-19 DIAGNOSIS — E78.00 PURE HYPERCHOLESTEROLEMIA: ICD-10-CM

## 2023-05-19 RX ORDER — CYCLOBENZAPRINE HCL 10 MG
10 TABLET ORAL 3 TIMES DAILY PRN
COMMUNITY

## 2023-05-19 RX ORDER — HYDROCODONE BITARTRATE AND ACETAMINOPHEN 10; 325 MG/1; MG/1
0.5 TABLET ORAL EVERY 6 HOURS PRN
COMMUNITY

## 2023-05-19 RX ORDER — CEPHALEXIN 500 MG/1
500 CAPSULE ORAL 3 TIMES DAILY
COMMUNITY

## 2023-05-19 RX ORDER — SEMAGLUTIDE 1.34 MG/ML
INJECTION, SOLUTION SUBCUTANEOUS
COMMUNITY

## 2023-05-19 NOTE — PROGRESS NOTES
[address] [place], [place]  [address], [address], [address]  [address]  PHONE: [phone]           HISTORY AND PHYSICAL          SUBJECTIVE:   Jovana Gutierrez is a 75 y.o. female seen for a consultation visit regarding the following:     Chief Complaint   Patient presents with    Consultation            HPI:    No cp or melendez. No orthopnea or pnd. No palpitations or syncope. Pre-op foot surgery. Allergies   Allergen Reactions    Clindamycin Anaphylaxis    Tramadol Anaphylaxis    Tramadol Hcl Anaphylaxis    Aloe Vera Hives    Codeine Itching and Nausea And Vomiting    Lanolin Hives    Moxifloxacin Itching    Clavulanic Acid Other (See Comments)     Severe leg cramps, Patient Denies allergy to potassium    Aloe Hives    Aspartame Nausea And Vomiting    Cefprozil Rash    Doxycycline Nausea And Vomiting    Fentanyl Nausea And Vomiting    Heparin Other (See Comments)    Morphine Nausea And Vomiting    Oxycodone Hcl Nausea And Vomiting    Propoxyphene Other (See Comments)    Sulfa Antibiotics Itching     Past Medical History:   Diagnosis Date    Adverse effect of anesthesia     delayed awakening    Anticoagulated on Coumadin     Anxiety     Arthritis     OA     Bleeding from nipple in female     Breast lump     left    Chronic kidney disease (CKD) stage G3b/A1, moderately decreased glomerular filtration rate (GFR) between 30-44 mL/min/1.73 square meter and albuminuria creatinine ratio less than 30 mg/g ([facility]) 2/3/2020    Chronic pain     shoulders and knees    CKD (chronic kidney disease) 11/13/2015    resolved per patient    Current use of long term anticoagulation     pt takes coumadin due to hx of CVA    Difficult intubation     per patient \"I was told my trachea takes a funny turn and need a shorter tube. \"     GERD (gastroesophageal reflux disease)     well controlled with med-    H/O right knee surgery     2007 (twice within a few weeks)    History of CVA (cerebrovascular accident) 2007    \"optic eye

## 2023-06-18 PROBLEM — Z01.818 PRE-OP EXAM: Status: RESOLVED | Noted: 2023-05-19 | Resolved: 2023-06-18

## 2024-08-09 ENCOUNTER — HOSPITAL ENCOUNTER (EMERGENCY)
Age: 69
Discharge: HOME | End: 2024-08-09
Payer: OTHER MISCELLANEOUS

## 2024-08-09 ENCOUNTER — APPOINTMENT (OUTPATIENT)
Dept: GENERAL RADIOLOGY | Age: 69
End: 2024-08-09
Payer: OTHER MISCELLANEOUS

## 2024-08-09 ENCOUNTER — APPOINTMENT (OUTPATIENT)
Dept: CT IMAGING | Age: 69
End: 2024-08-09
Payer: OTHER MISCELLANEOUS

## 2024-08-09 VITALS
DIASTOLIC BLOOD PRESSURE: 79 MMHG | HEART RATE: 67 BPM | WEIGHT: 192 LBS | TEMPERATURE: 97.8 F | HEIGHT: 62 IN | SYSTOLIC BLOOD PRESSURE: 129 MMHG | RESPIRATION RATE: 20 BRPM | OXYGEN SATURATION: 99 % | BODY MASS INDEX: 35.33 KG/M2

## 2024-08-09 DIAGNOSIS — M25.512 ACUTE PAIN OF LEFT SHOULDER: ICD-10-CM

## 2024-08-09 DIAGNOSIS — S99.911A INJURY OF RIGHT ANKLE, INITIAL ENCOUNTER: Primary | ICD-10-CM
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
## 2024-08-09 DIAGNOSIS — M89.8X1 PAIN OF LEFT CLAVICLE: ICD-10-CM

## 2024-08-09 DIAGNOSIS — R07.81 RIB PAIN ON RIGHT SIDE: ICD-10-CM

## 2024-08-09 DIAGNOSIS — M54.50 ACUTE MIDLINE LOW BACK PAIN WITHOUT SCIATICA: ICD-10-CM

## 2024-08-09 LAB
ALBUMIN SERPL-MCNC: 3.2 G/DL (ref 3.2–4.6)
ALBUMIN/GLOB SERPL: 1.2 (ref 1–1.9)
ALP SERPL-CCNC: 112 U/L (ref 35–104)
ALT SERPL-CCNC: 22 U/L (ref 12–65)
ANION GAP SERPL CALC-SCNC: 12 MMOL/L (ref 9–18)
AST SERPL-CCNC: 31 U/L (ref 15–37)
BASOPHILS # BLD: 0.1 K/UL (ref 0–0.2)
BASOPHILS NFR BLD: 1 % (ref 0–2)
BILIRUB SERPL-MCNC: 0.6 MG/DL (ref 0–1.2)
BUN SERPL-MCNC: 18 MG/DL (ref 8–23)
CALCIUM SERPL-MCNC: 8.7 MG/DL (ref 8.8–10.2)
CHLORIDE SERPL-SCNC: 105 MMOL/L (ref 98–107)
CO2 SERPL-SCNC: 22 MMOL/L (ref 20–28)
CREAT SERPL-MCNC: 1.43 MG/DL (ref 0.6–1.1)
DIFFERENTIAL METHOD BLD: NORMAL
EOSINOPHIL # BLD: 0.1 K/UL (ref 0–0.8)
EOSINOPHIL NFR BLD: 2 % (ref 0.5–7.8)
ERYTHROCYTE [DISTWIDTH] IN BLOOD BY AUTOMATED COUNT: 13.3 % (ref 11.9–14.6)
GLOBULIN SER CALC-MCNC: 2.6 G/DL (ref 2.3–3.5)
GLUCOSE SERPL-MCNC: 139 MG/DL (ref 70–99)
HCT VFR BLD AUTO: 40.2 % (ref 35.8–46.3)
HGB BLD-MCNC: 13.6 G/DL (ref 11.7–15.4)
IMM GRANULOCYTES # BLD AUTO: 0 K/UL (ref 0–0.5)
IMM GRANULOCYTES NFR BLD AUTO: 0 % (ref 0–5)
INR PPP: 2.1
LYMPHOCYTES # BLD: 3.5 K/UL (ref 0.5–4.6)
LYMPHOCYTES NFR BLD: 39 % (ref 13–44)
MAGNESIUM SERPL-MCNC: 2.1 MG/DL (ref 1.8–2.4)
MCH RBC QN AUTO: 30.9 PG (ref 26.1–32.9)
MCHC RBC AUTO-ENTMCNC: 33.8 G/DL (ref 31.4–35)
MCV RBC AUTO: 91.4 FL (ref 82–102)
MONOCYTES # BLD: 0.7 K/UL (ref 0.1–1.3)
MONOCYTES NFR BLD: 8 % (ref 4–12)
NEUTS SEG # BLD: 4.6 K/UL (ref 1.7–8.2)
NEUTS SEG NFR BLD: 51 % (ref 43–78)
NRBC # BLD: 0 K/UL (ref 0–0.2)
PLATELET # BLD AUTO: 228 K/UL (ref 150–450)
PMV BLD AUTO: 9.5 FL (ref 9.4–12.3)
POTASSIUM SERPL-SCNC: 4.5 MMOL/L (ref 3.5–5.1)
PROT SERPL-MCNC: 5.8 G/DL (ref 6.3–8.2)
PROTHROMBIN TIME: 24.6 SEC (ref 11.3–14.9)
RBC # BLD AUTO: 4.4 M/UL (ref 4.05–5.2)
SODIUM SERPL-SCNC: 139 MMOL/L (ref 136–145)
TROPONIN T SERPL HS-MCNC: 9 NG/L (ref 0–14)
WBC # BLD AUTO: 9.1 K/UL (ref 4.3–11.1)

## 2024-08-09 PROCEDURE — 6370000000 HC RX 637 (ALT 250 FOR IP): Performed by: NURSE PRACTITIONER

## 2024-08-09 PROCEDURE — 71250 CT THORAX DX C-: CPT

## 2024-08-09 PROCEDURE — 99285 EMERGENCY DEPT VISIT HI MDM: CPT

## 2024-08-09 PROCEDURE — 74176 CT ABD & PELVIS W/O CONTRAST: CPT

## 2024-08-09 PROCEDURE — 70450 CT HEAD/BRAIN W/O DYE: CPT

## 2024-08-09 PROCEDURE — 80053 COMPREHEN METABOLIC PANEL: CPT

## 2024-08-09 PROCEDURE — 85610 PROTHROMBIN TIME: CPT

## 2024-08-09 PROCEDURE — 73610 X-RAY EXAM OF ANKLE: CPT

## 2024-08-09 PROCEDURE — 83735 ASSAY OF MAGNESIUM: CPT

## 2024-08-09 PROCEDURE — 93005 ELECTROCARDIOGRAM TRACING: CPT | Performed by: NURSE PRACTITIONER

## 2024-08-09 PROCEDURE — 85025 COMPLETE CBC W/AUTO DIFF WBC: CPT

## 2024-08-09 PROCEDURE — 72125 CT NECK SPINE W/O DYE: CPT

## 2024-08-09 PROCEDURE — 84484 ASSAY OF TROPONIN QUANT: CPT

## 2024-08-09 RX ORDER — OXYCODONE HYDROCHLORIDE 5 MG/1
5 TABLET ORAL
Status: COMPLETED | OUTPATIENT
Start: 2024-08-09 | End: 2024-08-09

## 2024-08-09 RX ORDER — ACETAMINOPHEN 500 MG
1000 TABLET ORAL
Status: COMPLETED | OUTPATIENT
Start: 2024-08-09 | End: 2024-08-09

## 2024-08-09 RX ORDER — HYDROCODONE BITARTRATE AND ACETAMINOPHEN 5; 325 MG/1; MG/1
1 TABLET ORAL
Status: DISCONTINUED | OUTPATIENT
Start: 2024-08-09 | End: 2024-08-09

## 2024-08-09 RX ADMIN — ACETAMINOPHEN 1000 MG: 500 TABLET, FILM COATED ORAL at 15:23

## 2024-08-09 RX ADMIN — OXYCODONE 5 MG: 5 TABLET ORAL at 18:12

## 2024-08-09 ASSESSMENT — PAIN SCALES - GENERAL
PAINLEVEL_OUTOF10: 10
PAINLEVEL_OUTOF10: 9

## 2024-08-09 ASSESSMENT — PAIN - FUNCTIONAL ASSESSMENT: PAIN_FUNCTIONAL_ASSESSMENT: 0-10

## 2024-08-09 ASSESSMENT — PAIN DESCRIPTION - LOCATION
LOCATION: ANKLE;SHOULDER
LOCATION: ANKLE;SHOULDER
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
## 2024-08-10 LAB
EKG ATRIAL RATE: 72 BPM
EKG DIAGNOSIS: NORMAL
EKG P AXIS: 56 DEGREES
EKG P-R INTERVAL: 136 MS
EKG Q-T INTERVAL: 394 MS
EKG QRS DURATION: 66 MS
EKG QTC CALCULATION (BAZETT): 431 MS
EKG R AXIS: 27 DEGREES
EKG T AXIS: 23 DEGREES
EKG VENTRICULAR RATE: 72 BPM

## 2024-08-10 PROCEDURE — 93010 ELECTROCARDIOGRAM REPORT: CPT | Performed by: INTERNAL MEDICINE

## 2024-08-12 ENCOUNTER — TELEPHONE (OUTPATIENT)
Dept: ORTHOPEDIC SURGERY | Age: 69
End: 2024-08-12

## 2024-09-11 ENCOUNTER — TRANSCRIBE ORDERS (OUTPATIENT)
Dept: SCHEDULING | Age: 69
End: 2024-09-11

## 2024-09-11 DIAGNOSIS — Z12.31 SCREENING MAMMOGRAM FOR HIGH-RISK PATIENT: Primary | ICD-10-CM

## 2025-06-17 RX ORDER — ESZOPICLONE 3 MG/1
3 TABLET, FILM COATED ORAL NIGHTLY
COMMUNITY
Start: 2025-02-06

## 2025-06-17 RX ORDER — TIRZEPATIDE 2.5 MG/.5ML
INJECTION, SOLUTION SUBCUTANEOUS WEEKLY
COMMUNITY

## 2025-06-17 NOTE — PERIOP NOTE
Patient verified name and .  Order for consent  was not found in EHR; patient verifies procedure.   Type 1B surgery,  assessment complete.  Orders not received.  Labs per surgeon: unknown  Labs per anesthesia protocol: pt/inr on DOS    Patient answered medical/surgical history questions at their best of ability. All prior to admission medications documented in EPIC.    Patient instructed on the following and [facility] per patient request:    Thank you for completing your phone assessment with me today. The following is a list of Pre-op instructions that you requested. Should you have any questions, please call # [phone].    Surgery Date: 7/3/25    Location: [address] ([address]), [place] [address]. Front of building reads Outpatient. [address] is just inside the entrance on the right.   YOU WILL RECEIVE A CALL FROM A PREOP NURSE BY 4PM THE BUSINESS DAY PRIOR TO SURGERY. IF YOU HAVE NOT RECEIVED A CALL BY 4PM, YOU MAY THEN CALL THE NUMBER LISTED BELOW TO REQUEST THE ARRIVAL TIME. DO NOT CALL PRIOR TO 4PM the business day prior to your surgery date. (#[phone] MAIN Preop or Outpatient Center [phone]) If you have any questions on the day of surgery, please call the pre-op department at the telephone number listed.     Required: CLEAR LIQUID DIET FOR 24 HOURS PRIOR TO SURGERY THEN STOP 4 HOURS PRIOR TO SURGERY. NOTHING BY MOUTH (NO GUM, CANDY OR MINTS). HOWEVER, YOU MAY HAVE SIPS OF WATER WITH THE SPECIFIED MORNING MEDICATIONS.    The GLP-1 agonists are associated with adverse gastrointestinal effects such as nausea, vomiting, and delayed gastric emptying. Delayed gastric emptying from GLP-1 agonists can increase the risk of regurgitation and pulmonary aspiration of gastric contents during general anesthesia and deep sedation.     The recommendation for patients taking Glucagon-Like Peptide-1 (GLP-1) Receptor Agonists is to have nothing after

## 2025-07-02 ENCOUNTER — ANESTHESIA EVENT (OUTPATIENT)
Dept: SURGERY | Age: 70
End: 2025-07-02
Payer: MEDICARE

## 2025-07-02 NOTE — PERIOP NOTE
Preop department called to notify patient of arrival time for scheduled procedure. Instructions given to   - Arrive at [facility] Entrance [address].  - Nothing to eat after midnight unless otherwise indicated. No gum, mints, or ice chips. You may have clear liquids two hours prior to arrival to the hospital.   - Have a responsible adult to drive patient to the hospital, stay during surgery, and patient will need supervision 24 hours after anesthesia.   - Use antibacterial soap in shower the night before surgery and on the morning of surgery.       Was patient contacted: yes-pts son virginia   Voicemail left:   Numbers contacted: [phone]   Arrival time: 0830    Time to stop clear liquids: 0630

## 2025-07-03 ENCOUNTER — ANESTHESIA (OUTPATIENT)
Dept: SURGERY | Age: 70
End: 2025-07-03
Payer: MEDICARE

## 2025-07-03 ENCOUNTER — HOSPITAL ENCOUNTER (OUTPATIENT)
Age: 70
Setting detail: OUTPATIENT SURGERY
Discharge: HOME | End: 2025-07-03
Attending: OTOLARYNGOLOGY | Admitting: OTOLARYNGOLOGY
Payer: MEDICARE

## 2025-07-03 VITALS
OXYGEN SATURATION: 100 % | TEMPERATURE: 98.6 F | WEIGHT: 187 LBS | SYSTOLIC BLOOD PRESSURE: 152 MMHG | HEART RATE: 67 BPM | BODY MASS INDEX: 35.3 KG/M2 | RESPIRATION RATE: 16 BRPM | DIASTOLIC BLOOD PRESSURE: 75 MMHG | HEIGHT: 61 IN

## 2025-07-03 LAB
GLUCOSE BLD STRIP.AUTO-MCNC: 63 MG/DL (ref 65–100)
GLUCOSE BLD STRIP.AUTO-MCNC: 96 MG/DL (ref 65–100)
INR BLD: 1.1 (ref 0.9–1.2)
PT BLD: 11.5 SECS (ref 9.6–11.6)
SERVICE CMNT-IMP: ABNORMAL
SERVICE CMNT-IMP: NORMAL

## 2025-07-03 PROCEDURE — 7100000000 HC PACU RECOVERY - FIRST 15 MIN: Performed by: OTOLARYNGOLOGY

## 2025-07-03 PROCEDURE — 7100000001 HC PACU RECOVERY - ADDTL 15 MIN: Performed by: OTOLARYNGOLOGY

## 2025-07-03 PROCEDURE — 6360000002 HC RX W HCPCS

## 2025-07-03 PROCEDURE — 3600000004 HC SURGERY LEVEL 4 BASE: Performed by: OTOLARYNGOLOGY

## 2025-07-03 PROCEDURE — 6360000002 HC RX W HCPCS: Performed by: OTOLARYNGOLOGY

## 2025-07-03 PROCEDURE — 2580000003 HC RX 258: Performed by: ANESTHESIOLOGY

## 2025-07-03 PROCEDURE — 82962 GLUCOSE BLOOD TEST: CPT

## 2025-07-03 PROCEDURE — 3700000001 HC ADD 15 MINUTES (ANESTHESIA): Performed by: OTOLARYNGOLOGY

## 2025-07-03 PROCEDURE — 85610 PROTHROMBIN TIME: CPT

## 2025-07-03 PROCEDURE — 2720000010 HC SURG SUPPLY STERILE: Performed by: OTOLARYNGOLOGY

## 2025-07-03 PROCEDURE — 3700000000 HC ANESTHESIA ATTENDED CARE: Performed by: OTOLARYNGOLOGY

## 2025-07-03 PROCEDURE — 6360000002 HC RX W HCPCS: Performed by: ANESTHESIOLOGY

## 2025-07-03 PROCEDURE — 2709999900 HC NON-CHARGEABLE SUPPLY: Performed by: OTOLARYNGOLOGY

## 2025-07-03 PROCEDURE — 3600000014 HC SURGERY LEVEL 4 ADDTL 15MIN: Performed by: OTOLARYNGOLOGY

## 2025-07-03 PROCEDURE — 7100000010 HC PHASE II RECOVERY - FIRST 15 MIN: Performed by: OTOLARYNGOLOGY

## 2025-07-03 PROCEDURE — 6370000000 HC RX 637 (ALT 250 FOR IP): Performed by: OTOLARYNGOLOGY

## 2025-07-03 RX ORDER — SODIUM CHLORIDE 0.9 % (FLUSH) 0.9 %
5-40 SYRINGE (ML) INJECTION PRN
Status: DISCONTINUED | OUTPATIENT
Start: 2025-07-03 | End: 2025-07-03 | Stop reason: HOSPADM

## 2025-07-03 RX ORDER — SODIUM CHLORIDE, SODIUM LACTATE, POTASSIUM CHLORIDE, CALCIUM CHLORIDE 600; 310; 30; 20 MG/100ML; MG/100ML; MG/100ML; MG/100ML
INJECTION, SOLUTION INTRAVENOUS CONTINUOUS
Status: DISCONTINUED | OUTPATIENT
Start: 2025-07-03 | End: 2025-07-03 | Stop reason: HOSPADM

## 2025-07-03 RX ORDER — ACETAMINOPHEN 500 MG
1000 TABLET ORAL ONCE
Status: DISCONTINUED | OUTPATIENT
Start: 2025-07-03 | End: 2025-07-03 | Stop reason: HOSPADM

## 2025-07-03 RX ORDER — SODIUM CHLORIDE 0.9 % (FLUSH) 0.9 %
5-40 SYRINGE (ML) INJECTION EVERY 12 HOURS SCHEDULED
Status: DISCONTINUED | OUTPATIENT
Start: 2025-07-03 | End: 2025-07-03 | Stop reason: HOSPADM

## 2025-07-03 RX ORDER — SCOPOLAMINE 1 MG/3D
1 PATCH, EXTENDED RELEASE TRANSDERMAL
Status: DISCONTINUED | OUTPATIENT
Start: 2025-07-03 | End: 2025-07-03 | Stop reason: HOSPADM

## 2025-07-03 RX ORDER — LIDOCAINE HYDROCHLORIDE 20 MG/ML
INJECTION, SOLUTION EPIDURAL; INFILTRATION; INTRACAUDAL; PERINEURAL
Status: DISCONTINUED | OUTPATIENT
Start: 2025-07-03 | End: 2025-07-03 | Stop reason: SDUPTHER

## 2025-07-03 RX ORDER — ONDANSETRON 2 MG/ML
INJECTION INTRAMUSCULAR; INTRAVENOUS
Status: DISCONTINUED | OUTPATIENT
Start: 2025-07-03 | End: 2025-07-03 | Stop reason: SDUPTHER

## 2025-07-03 RX ORDER — LIDOCAINE HYDROCHLORIDE AND EPINEPHRINE 10; 10 MG/ML; UG/ML
INJECTION, SOLUTION INFILTRATION; PERINEURAL PRN
Status: DISCONTINUED | OUTPATIENT
Start: 2025-07-03 | End: 2025-07-03 | Stop reason: ALTCHOICE

## 2025-07-03 RX ORDER — DIPHENHYDRAMINE HYDROCHLORIDE 50 MG/ML
12.5 INJECTION, SOLUTION INTRAMUSCULAR; INTRAVENOUS
Status: DISCONTINUED | OUTPATIENT
Start: 2025-07-03 | End: 2025-07-03 | Stop reason: HOSPADM

## 2025-07-03 RX ORDER — OXYMETAZOLINE HYDROCHLORIDE 0.05 G/100ML
SPRAY NASAL PRN
Status: DISCONTINUED | OUTPATIENT
Start: 2025-07-03 | End: 2025-07-03 | Stop reason: ALTCHOICE

## 2025-07-03 RX ORDER — DEXTROSE MONOHYDRATE 100 MG/ML
INJECTION, SOLUTION INTRAVENOUS CONTINUOUS PRN
Status: DISCONTINUED | OUTPATIENT
Start: 2025-07-03 | End: 2025-07-03 | Stop reason: HOSPADM

## 2025-07-03 RX ORDER — OXYCODONE HYDROCHLORIDE 5 MG/1
5 TABLET ORAL
Status: DISCONTINUED | OUTPATIENT
Start: 2025-07-03 | End: 2025-07-03 | Stop reason: HOSPADM

## 2025-07-03 RX ORDER — NALOXONE HYDROCHLORIDE 0.4 MG/ML
INJECTION, SOLUTION INTRAMUSCULAR; INTRAVENOUS; SUBCUTANEOUS PRN
Status: DISCONTINUED | OUTPATIENT
Start: 2025-07-03 | End: 2025-07-03 | Stop reason: HOSPADM

## 2025-07-03 RX ORDER — SODIUM CHLORIDE 9 MG/ML
INJECTION, SOLUTION INTRAVENOUS PRN
Status: DISCONTINUED | OUTPATIENT
Start: 2025-07-03 | End: 2025-07-03 | Stop reason: HOSPADM

## 2025-07-03 RX ORDER — IBUPROFEN 600 MG/1
1 TABLET ORAL PRN
Status: DISCONTINUED | OUTPATIENT
Start: 2025-07-03 | End: 2025-07-03 | Stop reason: HOSPADM

## 2025-07-03 RX ORDER — PROPOFOL 10 MG/ML
INJECTION, EMULSION INTRAVENOUS
Status: DISCONTINUED | OUTPATIENT
Start: 2025-07-03 | End: 2025-07-03 | Stop reason: SDUPTHER

## 2025-07-03 RX ORDER — FENTANYL CITRATE 50 UG/ML
100 INJECTION, SOLUTION INTRAMUSCULAR; INTRAVENOUS
Status: DISCONTINUED | OUTPATIENT
Start: 2025-07-03 | End: 2025-07-03 | Stop reason: HOSPADM

## 2025-07-03 RX ORDER — PROCHLORPERAZINE EDISYLATE 5 MG/ML
5 INJECTION INTRAMUSCULAR; INTRAVENOUS
Status: DISCONTINUED | OUTPATIENT
Start: 2025-07-03 | End: 2025-07-03 | Stop reason: HOSPADM

## 2025-07-03 RX ORDER — MIDAZOLAM HYDROCHLORIDE 2 MG/2ML
2 INJECTION, SOLUTION INTRAMUSCULAR; INTRAVENOUS
Status: DISCONTINUED | OUTPATIENT
Start: 2025-07-03 | End: 2025-07-03 | Stop reason: HOSPADM

## 2025-07-03 RX ORDER — LIDOCAINE HYDROCHLORIDE 10 MG/ML
1 INJECTION, SOLUTION INFILTRATION; PERINEURAL
Status: DISCONTINUED | OUTPATIENT
Start: 2025-07-03 | End: 2025-07-03 | Stop reason: HOSPADM

## 2025-07-03 RX ORDER — DEXAMETHASONE SODIUM PHOSPHATE 10 MG/ML
INJECTION, SOLUTION INTRA-ARTICULAR; INTRALESIONAL; INTRAMUSCULAR; INTRAVENOUS; SOFT TISSUE
Status: DISCONTINUED | OUTPATIENT
Start: 2025-07-03 | End: 2025-07-03 | Stop reason: SDUPTHER

## 2025-07-03 RX ORDER — FENTANYL CITRATE 50 UG/ML
INJECTION, SOLUTION INTRAMUSCULAR; INTRAVENOUS
Status: DISCONTINUED | OUTPATIENT
Start: 2025-07-03 | End: 2025-07-03 | Stop reason: SDUPTHER

## 2025-07-03 RX ADMIN — ONDANSETRON 4 MG: 2 INJECTION, SOLUTION INTRAMUSCULAR; INTRAVENOUS at 10:30

## 2025-07-03 RX ADMIN — LIDOCAINE HYDROCHLORIDE 100 MG: 20 INJECTION, SOLUTION EPIDURAL; INFILTRATION; INTRACAUDAL; PERINEURAL at 10:26

## 2025-07-03 RX ADMIN — FENTANYL CITRATE 100 MCG: 50 INJECTION, SOLUTION INTRAMUSCULAR; INTRAVENOUS at 10:26

## 2025-07-03 RX ADMIN — SODIUM CHLORIDE, SODIUM LACTATE, POTASSIUM CHLORIDE, AND CALCIUM CHLORIDE: 600; 310; 30; 20 INJECTION, SOLUTION INTRAVENOUS at 10:15

## 2025-07-03 RX ADMIN — PROPOFOL 50 MG: 10 INJECTION, EMULSION INTRAVENOUS at 10:37

## 2025-07-03 RX ADMIN — PROPOFOL 200 MG: 10 INJECTION, EMULSION INTRAVENOUS at 10:26

## 2025-07-03 RX ADMIN — HYDROMORPHONE HYDROCHLORIDE 0.5 MG: 1 INJECTION, SOLUTION INTRAMUSCULAR; INTRAVENOUS; SUBCUTANEOUS at 11:07

## 2025-07-03 RX ADMIN — DEXTROSE 125 ML: 10 SOLUTION INTRAVENOUS at 10:15

## 2025-07-03 RX ADMIN — SODIUM CHLORIDE, SODIUM LACTATE, POTASSIUM CHLORIDE, AND CALCIUM CHLORIDE: 600; 310; 30; 20 INJECTION, SOLUTION INTRAVENOUS at 09:48

## 2025-07-03 RX ADMIN — DEXAMETHASONE SODIUM PHOSPHATE 10 MG: 10 INJECTION INTRAMUSCULAR; INTRAVENOUS at 10:30

## 2025-07-03 ASSESSMENT — PAIN DESCRIPTION - LOCATION
LOCATION: BACK
LOCATION: NOSE
LOCATION: NOSE

## 2025-07-03 ASSESSMENT — PAIN SCALES - GENERAL
PAINLEVEL_OUTOF10: 4
PAINLEVEL_OUTOF10: 0
PAINLEVEL_OUTOF10: 8
PAINLEVEL_OUTOF10: 7

## 2025-07-03 ASSESSMENT — PAIN DESCRIPTION - PAIN TYPE: TYPE: CHRONIC PAIN

## 2025-07-03 NOTE — ANESTHESIA PRE PROCEDURE
Department of Anesthesiology  Preprocedure Note       Name:  Holley Estrada   Age:  69 y.o.  :  1955                                          MRN:  [id number]         Date:  7/3/2025      Surgeon: Surgeon(s):  Surjit Whiteside DO    Procedure: Procedure(s):  Direct Laryngoscopy with possible biopsy  Bilateral inferior turbinate reduction    Medications prior to admission:   Prior to Admission medications    Medication Sig Start Date End Date Taking? Authorizing Provider   Tirzepatide (MOUNJARO) 2.5 MG/0.5ML SOAJ pen Inject into the skin once a week   Yes Provider, MD Lexi   empagliflozin (JARDIANCE) 10 MG tablet Take 1 tablet by mouth every morning 25  Yes ProviderLexi MD   eszopiclone 3 MG TABS Take 1 tablet by mouth nightly. 25  Yes Provider, MD Lexi   cyclobenzaprine (FLEXERIL) 10 MG tablet Take 1 tablet by mouth 3 times daily as needed for Muscle spasms   Yes ProviderLexi MD   Omega-3 Fatty Acids (FISH OIL PO) Take by mouth   Yes Provider, MD Lexi   Cholecalciferol (VITAMIN D3 PO) Take by mouth   Yes Provider, MD Lexi   HYDROcodone-acetaminophen (NORCO)  MG per tablet Take 1 tablet by mouth every 6 hours as needed for Pain.   Yes ProviderLexi MD   allopurinol (ZYLOPRIM) 300 MG tablet Take 1 tablet by mouth daily 20  Yes Automatic Reconciliation, Ar   atorvastatin (LIPITOR) 20 MG tablet Take 1 tablet by mouth daily 19  Yes Automatic Reconciliation, Ar   colchicine (COLCRYS) 0.6 MG tablet Take 1 tablet by mouth daily as needed for Pain (gout) Gout flare 10/26/15  Yes Automatic Reconciliation, Ar   diphenhydrAMINE (BENADRYL) 25 MG capsule Take 2 capsules by mouth every 6 hours as needed   Yes Automatic Reconciliation, Ar   DULoxetine (CYMBALTA) 60 MG extended release capsule Take 1 capsule by mouth daily 19  Yes Automatic Reconciliation, Ar   enoxaparin (LOVENOX) 30 MG/0.3ML injection Inject 0.3 mLs into the skin daily

## 2025-07-03 NOTE — DISCHARGE INSTRUCTIONS
10/25/2017  Simona Bassett is a 89 y.o., female with PMH significant for auditory impairment, left eye blindness, GERD, tobacco abuse, HLD, osteoporosis, and chronic pain is s/p fall at home with resultant left intertrochanteric femur fracture. Patient is now scheduled for the below procedure.     Pre-operative evaluation for Procedure(s) (LRB):  IM NAILING OF FEMUR - left - synthes - profx (Left)    LDAs:   Right AC 20 G PIV    Previous intubation: none on file     Simona Bassett is a 89 y.o. female     Patient Active Problem List   Diagnosis    Hyperlipidemia    Osteoporosis, post-menopausal    Tobacco abuse    Thoracic compression fracture    Esophageal dysmotility    GERD (gastroesophageal reflux disease)    Constipation due to pain medication    Chronic pain    Closed displaced intertrochanteric fracture of left femur    Poor appetite    Closed fracture of base of fifth metatarsal bone       Review of patient's allergies indicates:  No Known Allergies    No current facility-administered medications on file prior to encounter.      Current Outpatient Prescriptions on File Prior to Encounter   Medication Sig Dispense Refill    aspirin 81 mg Tab Every day      CALCIUM CARBONATE/VITAMIN D3 (CALCIUM 500 + D ORAL) Twice a day      lactulose (CHRONULAC) 10 gram/15 mL solution as needed.       nitroGLYCERIN (NITROSTAT) 0.4 MG SL tablet Place 1 tablet (0.4 mg total) under the tongue every 5 (five) minutes as needed for Chest pain. Every day  tablet 5    polymyxin B sulf-trimethoprim (POLYTRIM) 10,000 unit- 1 mg/mL Drop Place 1 drop into the left eye 3 (three) times daily.       simvastatin (ZOCOR) 40 MG tablet TAKE ONE TABLET BY MOUTH ONCE DAILY IN THE EVENING 90 tablet 3    tramadol (ULTRAM) 50 mg tablet Take 1 tablet (50 mg total) by mouth once daily. 90 tablet 0    esomeprazole  Things to Remember After Sinus Surgery    1. Sleep and rest with head elevated on several pillows to decrease swelling and pressure.    2. You will have a 2 X 2 gauze under your nose to absorb the bloody discharge you will have the first 2 to 3 days after surgery. If you saturate the gauze more than 3 times in 30 minutes, please notify the office.    3. Numbness to the front teeth after nasal surgery is normal. The feeling usually returns in 6 to 8 weeks.    4. Clean the end of your nose with hydrogen peroxide. Do not try to clean inside. It may cause bleeding.    5. Do not blow your nose until you have had your first post-op appointment ( at least one week after surgery).    6. For a mild pain and a low-grade temperature, you may take Tylenol. No aspirin, Motrin, Advil, or Aleve for at least 3 weeks after nasal surgery.    7. If the nasal surgery requires an exterior cast and the cast falls off, please notify the office during office hours.    8. Nasal congestion after surgery is normal and will resolve after the splints and/or packing are removed.      ACTIVITY   Bathe or shower as directed by your doctor.   Avoid strenuous work and becoming overheated. Activity as directed by your doctor   Avoid bending over.    DIET   Clear, cool liquids the first day; then soft diet the second day   Advance to regular diet on third day, unless your doctor orders otherwise.   No milk products or foods with red color dyes   If nausea and vomiting continues, call your doctor.    PAIN   Take pain medication as directed by your doctor.   Call your doctor if pain is NOT relieved by medication.   DO NOT take aspirin or blood thinners until directed by your doctor.    FOLLOW-UP PHONE CALLS   Calls will be made by nursing staff   If you have any problems, call your doctor as needed.    CALL YOUR DOCTOR IF   Bleeding is expected the first few days and should gradually clear.   Temperature of 10 1°F or above   Green or yellow discharge  (NEXIUM) 20 MG capsule Take 1 capsule (20 mg total) by mouth before breakfast. (Patient taking differently: Take 20 mg by mouth before breakfast. Daily as needed) 90 capsule 3       Past Surgical History:   Procedure Laterality Date    BACK SURGERY      CHOLECYSTECTOMY      EYE SURGERY Bilateral     CATARACT SURGERY    GALLBLADDER SURGERY      SPINE SURGERY         Social History     Social History    Marital status:      Spouse name: N/A    Number of children: 6    Years of education: N/A     Occupational History    Not on file.     Social History Main Topics    Smoking status: Current Every Day Smoker     Packs/day: 1.00     Years: 60.00    Smokeless tobacco: Never Used    Alcohol use No    Drug use: No    Sexual activity: Not on file     Other Topics Concern    Not on file     Social History Narrative    No narrative on file         Vital Signs Range (Last 24H):  Temp:  [36.3 °C (97.3 °F)-36.6 °C (97.9 °F)]   Pulse:  [75-87]   Resp:  [18-26]   BP: (118-143)/(65-68)   SpO2:  [94 %-97 %]       CBC:   Recent Labs      10/25/17   1702   WBC  8.47   RBC  4.06   HGB  12.3   HCT  39.0   PLT  321   MCV  96   MCH  30.3   MCHC  31.5*       CMP:   Recent Labs      10/25/17   1702   NA  138   K  3.5   CL  95   CO2  34*   BUN  19   CREATININE  0.8   GLU  66*   CALCIUM  10.2       INR  Recent Labs      10/25/17   1702   INR  0.9           Diagnostic Studies:  EKG (10/25/2017):  Normal sinus rhythm  Possible Left atrial enlargement  Incomplete left bundle branch block  ST and T wave abnormality, consider lateral ischemia  Prolonged QT  Abnormal ECG  When compared with ECG of 25-OCT-2017 18:36,  No significant change was found    2D Echo: none on file    Anesthesia Evaluation    I have reviewed the Patient Summary Reports.     I have reviewed the Medications.     Review of Systems  Anesthesia Hx:  No problems with previous Anesthesia Denies Hx of Anesthetic complications  History of prior surgery of  interest to airway management or planning: Denies Family Hx of Anesthesia complications.   Denies Personal Hx of Anesthesia complications.   Social:  Smoker    Cardiovascular:  Cardiovascular Normal     Pulmonary:  Pulmonary Normal    Hepatic/GI:   GERD    Neurological:   Chronic Pain Syndrome   Endocrine:  Endocrine Normal        Physical Exam  General:  Malnutrition, Cachexia    Airway/Jaw/Neck:  Airway Findings: Mouth Opening: Normal Tongue: Normal  General Airway Assessment: Adult  Mallampati: IV  Improves to III with phonation.  TM Distance: 4 - 6 cm  Jaw/Neck Findings:  Neck ROM: Extension Decreased, Mild  Neck Findings: Normal    Eyes/Ears/Nose:  Eyes/Ears/Nose Findings: Left eye blindness    Dental:  Dental Findings: In tact   Chest/Lungs:  Chest/Lungs Findings: Clear to auscultation, Normal Respiratory Rate     Heart/Vascular:  Heart Findings: Rate: Normal  Rhythm: Regular Rhythm  Sounds: Normal        Mental Status:  Mental Status Findings:  Cooperative, Alert and Oriented         Anesthesia Plan  Type of Anesthesia, risks & benefits discussed:  Anesthesia Type:  general, regional  Patient's Preference:   Intra-op Monitoring Plan: standard ASA monitors  Intra-op Monitoring Plan Comments:   Post Op Pain Control Plan:   Post Op Pain Control Plan Comments:   Induction:   IV  Beta Blocker:  Patient is not currently on a Beta-Blocker (No further documentation required).       Informed Consent: Patient understands risks and agrees with Anesthesia plan.  Questions answered. Anesthesia consent signed with patient.  ASA Score: 3     Day of Surgery Review of History & Physical:            Ready For Surgery From Anesthesia Perspective.

## 2025-07-03 NOTE — OP NOTE
[address]  [address]                            OPERATIVE REPORT      PATIENT NAME: ABIGAIL BILLY           : 1955  MED REC NO: [id number]                       ROOM: OPOR  ACCOUNT NO: [id number]                       ADMIT DATE: 2025  PROVIDER: Surjit Whiteside DO    DATE OF SERVICE:  2025    PREOPERATIVE DIAGNOSES:  Inferior turbinate hypertrophy, nasal obstruction, hoarseness.    POSTOPERATIVE DIAGNOSES:  Inferior turbinate hypertrophy, nasal obstruction, hoarseness.    PROCEDURES PERFORMED:  Direct laryngoscopy and bilateral submucosal resection of the inferior turbinates.    SURGEON:  Surjit Whiteside DO    ASSISTANT:  None.    ANESTHESIA:  General.    ESTIMATED BLOOD LOSS:  10 mL.    SPECIMENS REMOVED:  None.    INTRAOPERATIVE FINDINGS:  ***     COMPLICATIONS:  None.    IMPLANTS:  None.    INDICATIONS:  This is a 69-year-old female, she is well known to me.  She has been seeing me for many years.  The main issue is usually her ability to breathe through her nose and she has had multiple nasal surgeries and although her nose has never been fully open, she knows more recently it is starting to get more congested than what it was and she has been on nasal steroid sprays without any improvement, saline spray without improvement, and then she has also complained about hoarseness, so I did do a scope in the office.  This did show bilateral true vocal cord erythema.  She had arytenoid erythema.  She had prominent false vocal cords.  There was no sign of obvious mass, but given the changes, I did recommend she undergo a direct laryngoscopy with possible biopsy along with an inferior turbinate reduction.  The procedure, risks, and benefits were discussed with her in the office.  All questions were answered.  She is agreeable to the surgery, surgery itself.    DESCRIPTION OF PROCEDURE:  The patient was identified in

## 2025-07-03 NOTE — ANESTHESIA POSTPROCEDURE EVALUATION
Department of Anesthesiology  Postprocedure Note    Patient: Holley Estrada  MRN: [id number]  YOB: 1955  Date of evaluation: 7/3/2025    Procedure Summary       Date: 07/03/25 Room / Location: [facility] OP OR 05 / [facility]    Anesthesia Start: 1015 Anesthesia Stop: 1058    Procedures:       Direct Laryngoscopy (Mouth)      Bilateral inferior turbinate reduction (Bilateral: Nose) Diagnosis:       Hoarseness      Acute laryngitis      (Hoarseness [R49.0])      (Acute laryngitis [J04.0])    Surgeons: Surjit Whiteside DO Responsible Provider: Nile Johnson MD    Anesthesia Type: general ASA Status: 3            Anesthesia Type: No value filed.    Roldan Phase I: Roldan Score: 8    Roldan Phase II: Roldan Score: 10    Anesthesia Post Evaluation    Patient location during evaluation: PACU  Patient participation: complete - patient participated  Level of consciousness: awake and alert  Airway patency: patent  Nausea: well controlled.  Cardiovascular status: acceptable.  Respiratory status: acceptable  Hydration status: stable  Pain management: adequate    No notable events documented.

## (undated) DEVICE — CARDINAL HEALTH FLEXIBLE LIGHT HANDLE COVER: Brand: CARDINAL HEALTH

## (undated) DEVICE — SUT CHRMC 3-0 27IN SH BRN --

## (undated) DEVICE — 2000CC GUARDIAN II: Brand: GUARDIAN

## (undated) DEVICE — MASTISOL ADHESIVE LIQ 2/3ML

## (undated) DEVICE — DRAPE,UNDERBUTTOCKS,PCH,STERILE: Brand: MEDLINE

## (undated) DEVICE — GOWN,SIRUS,NON REINFRCD,LARGE,SET IN SL: Brand: MEDLINE

## (undated) DEVICE — TUBING, SUCTION, 1/4" X 10', STRAIGHT: Brand: MEDLINE

## (undated) DEVICE — SOLUTION IV 1000ML 0.9% SOD CHL

## (undated) DEVICE — COTTON BALLS: Brand: DEROYAL

## (undated) DEVICE — ELECTRO LUBE IS A SINGLE PATIENT USE DEVICE THAT IS INTENDED TO BE USED ON ELECTROSURGICAL ELECTRODES TO REDUCE STICKING.: Brand: KEY SURGICAL ELECTRO LUBE

## (undated) DEVICE — E-Z CLEAN, NON-STICK, PTFE COATED, MEGA FINE ELECTROSURGICAL NEEDLE ELECTRODE, SHARP, 2.5 INCH (6.3 CM): Brand: MEGADYNE

## (undated) DEVICE — KENDALL SCD EXPRESS SLEEVES, KNEE LENGTH, LARGE: Brand: KENDALL SCD

## (undated) DEVICE — PVC URETHRAL CATHETER: Brand: DOVER

## (undated) DEVICE — LARGE NEEDLE DRIVER: Brand: ENDOWRIST;DAVINCI SI

## (undated) DEVICE — APPLICATOR SURG XL L38CM FOR ARISTA ABSRB HEMSTAT FLEXITIP

## (undated) DEVICE — SEAL CANN F/12MM 8.5-13MM DISP -- DA VINCI

## (undated) DEVICE — STANDARD HYPODERMIC NEEDLE,POLYPROPYLENE HUB: Brand: MONOJECT

## (undated) DEVICE — DEVICE INFL PRSS G INDIC DISP (MUST BE PURC IN MULTIPLES OF 5)

## (undated) DEVICE — SUTURE VCRL SZ 0 L36IN ABSRB UD L36MM CT-1 1/2 CIR J946H

## (undated) DEVICE — BAG DRNGE 4000ML CONT IRRIG ROUNDED TEARDROP SHP DISP

## (undated) DEVICE — KENDALL SCD EXPRESS SLEEVES, KNEE LENGTH, MEDIUM: Brand: KENDALL SCD

## (undated) DEVICE — SOLUTION IRRIG 1000ML 09% SOD CHL USP PIC PLAS CONTAINER

## (undated) DEVICE — TIP COVER ACCESSORY

## (undated) DEVICE — CANISTER, RIGID, 2000CC: Brand: MEDLINE INDUSTRIES, INC.

## (undated) DEVICE — SOLUTION SCRB 4OZ 4% CHG CLN BASE FOR PT SKIN ANTISEPSIS

## (undated) DEVICE — CODMAN® SURGICAL PATTIES 1" X 3" (2.54CM X 7.62CM): Brand: CODMAN®

## (undated) DEVICE — JELLY LUBRICATING 10GM PREFIL SYR LUBE

## (undated) DEVICE — MEDI-VAC NON-CONDUCTIVE SUCTION TUBING: Brand: CARDINAL HEALTH

## (undated) DEVICE — Device

## (undated) DEVICE — DRAPE ROBOTIC CAM ARM DISP ENDOWRIST DA VINCI SI

## (undated) DEVICE — CONTAINER SPEC FRMLN 120ML --

## (undated) DEVICE — (D)DEVICE TISS REMOVAL -- SOLD AS BX/3 USE ITEM 335978

## (undated) DEVICE — SUTURE ETHLN SZ 5-0 L18IN NONABSORBABLE UD L13MM P-3 3/8 690G

## (undated) DEVICE — VCARE MEDIUM, UTERINE MANIPULATOR, VAGINAL-CERVICAL-AHLUWALIA'S-RETRACTOR-ELEVATOR: Brand: VCARE

## (undated) DEVICE — SHEET, DRAPE, SPLIT, STERILE: Brand: MEDLINE

## (undated) DEVICE — BASIC SINGLE BASIN-LF: Brand: MEDLINE INDUSTRIES, INC.

## (undated) DEVICE — VISUALIZATION SYSTEM: Brand: CLEARIFY

## (undated) DEVICE — SOLUTION IRRIG 3000ML 0.9% SOD CHL FLX CONT 0797208] ICU MEDICAL INC]

## (undated) DEVICE — SOLUTION IRRIG 1000ML H2O PIC PLAS SHATTERPROOF CONTAINER

## (undated) DEVICE — CYSTO: Brand: MEDLINE INDUSTRIES, INC.

## (undated) DEVICE — REM POLYHESIVE ADULT PATIENT RETURN ELECTRODE: Brand: VALLEYLAB

## (undated) DEVICE — KIT PROCEDURE SURG HEAD AND NECK TOTE

## (undated) DEVICE — FENESTRATED BIPOLAR FORCEPS: Brand: ENDOWRIST;DAVINCI SI

## (undated) DEVICE — SOLUTION IRRIG 1000ML H2O STRL BLT

## (undated) DEVICE — TUBE ST FLD AQUILEX OUTFLO --

## (undated) DEVICE — KIT,ANTI FOG,W/SPONGE & FLUID,SOFT PACK: Brand: MEDLINE

## (undated) DEVICE — GAUZE SPONGES,8 PLY: Brand: CURITY

## (undated) DEVICE — AGENT HEMSTAT W1XL2IN ABSRB SFT LTWT LAYERED ORC FIBRILLAR

## (undated) DEVICE — SUTURE VCRL SZ 4-0 L27IN ABSRB UD L17MM RB-1 1/2 CIR J214H

## (undated) DEVICE — MEGASUTURECUT ND: Brand: ENDOWRIST;DAVINCI SI

## (undated) DEVICE — (D)PREP SKN CHLRAPRP APPL 26ML -- CONVERT TO ITEM 371833

## (undated) DEVICE — PACKING 8004008 NEURAY 200PK 25X76MM: Brand: NEURAY ®

## (undated) DEVICE — DRAPE TWL SURG 16X26IN BLU ORB04] ALLCARE INC]

## (undated) DEVICE — LAP CHOLE: Brand: MEDLINE INDUSTRIES, INC.

## (undated) DEVICE — BASIC SINGLE BASIN 2-LF: Brand: MEDLINE INDUSTRIES, INC.

## (undated) DEVICE — TUBE ST FLD CTRL AQUILEX INFLO --

## (undated) DEVICE — BLADELESS OPTICAL TROCAR WITH FIXATION CANNULA: Brand: VERSAPORT

## (undated) DEVICE — GOWN,REINF,POLY,ECL,PP SLV,XL: Brand: MEDLINE

## (undated) DEVICE — OBTRTR BLDELSS 8MM DISP -- DA VINCI - SNGL USE

## (undated) DEVICE — GLOVE ORANGE PI 8 1/2   MSG9085

## (undated) DEVICE — HEAD AND NECK: Brand: MEDLINE INDUSTRIES, INC.

## (undated) DEVICE — SUTURE VCRL + SZ 4-0 L27IN ABSRB UD PS-2 3/8 CIR REV CUT VCP426H

## (undated) DEVICE — PROGRASP FORCEPS: Brand: ENDOWRIST;DAVINCI SI

## (undated) DEVICE — BETADINE 5% EYE SOL

## (undated) DEVICE — MICRODISSECTION NEEDLE STRAIGHT SLEEVE: Brand: COLORADO

## (undated) DEVICE — SOL ANTI-FOG 6ML MEDC -- MEDICHOICE - CONVERT TO 358427

## (undated) DEVICE — DRAPE,TOP,102X53,STERILE: Brand: MEDLINE

## (undated) DEVICE — 40585 XL ADVANCED TRENDELENBURG POSITIONING KIT: Brand: 40585 XL ADVANCED TRENDELENBURG POSITIONING KIT

## (undated) DEVICE — BLADE 1882040 5PK INFERIOR TURB 2MM

## (undated) DEVICE — SPONGE LAP 18X18IN STRL -- 5/PK

## (undated) DEVICE — SPONGE,NEURO,1"X3",XR,STRL,LF,10/PK: Brand: MEDLINE

## (undated) DEVICE — DRAPE ROBOTIC CAM HD DISP ENDOWRIST DA VINCI SI

## (undated) DEVICE — SURGICAL PROCEDURE PACK BASIC ST FRANCIS

## (undated) DEVICE — SYR 10ML LUER LOK 1/5ML GRAD --

## (undated) DEVICE — BALLOON SINUPLASTY 6X16 MM SYS RELIEVA SPINPLUS

## (undated) DEVICE — PAD,NON-ADHERENT,3X8,STERILE,LF,1/PK: Brand: CURAD

## (undated) DEVICE — [HIGH FLOW INSUFFLATOR,  DO NOT USE IF PACKAGE IS DAMAGED,  KEEP DRY,  KEEP AWAY FROM SUNLIGHT,  PROTECT FROM HEAT AND RADIOACTIVE SOURCES.]: Brand: PNEUMOSURE

## (undated) DEVICE — APPLICATOR COT TIP 3IN WOOD --

## (undated) DEVICE — OCCLUSIVE GAUZE STRIP,3% BISMUTH TRIBROMOPHENATE IN PETROLATUM BLEND: Brand: XEROFORM

## (undated) DEVICE — RETRACTOR ENDOSCP SHFT L31MM DIA12MM BLK ATRAUM RECTANG

## (undated) DEVICE — SUTURE VCRL SZ 0 L54IN ABSRB UD LIGAPAK REEL NDL J287G

## (undated) DEVICE — BUTTON SWITCH PENCIL BLADE ELECTRODE, HOLSTER: Brand: EDGE

## (undated) DEVICE — DRAPE SHT 3 QTR PROXIMA 53X77 --

## (undated) DEVICE — PK DISSECTING FORCEPS: Brand: ENDOWRIST;DAVINCI SI

## (undated) DEVICE — BIPOLAR FORCEPS CORD,BANANA LEADS: Brand: VALLEYLAB

## (undated) DEVICE — COVER,TABLE,44X76,STERILE: Brand: MEDLINE

## (undated) DEVICE — SUTURE DEV SZ 2-0 WND CLSR ABSRB GS-22 VLOC COVIDIEN VLOCM2145

## (undated) DEVICE — AGENT HEMSTAT 5GM ARISTA AH

## (undated) DEVICE — SLIM BODY SKIN STAPLER: Brand: APPOSE ULC

## (undated) DEVICE — 2, DISPOSABLE SUCTION/IRRIGATOR WITHOUT DISPOSABLE TIP: Brand: STRYKEFLOW

## (undated) DEVICE — DRAPE INSTR ARM ROBOTIC ENDOWRIST DA VINCI S

## (undated) DEVICE — TRAY PREP DRY W/ PREM GLV 2 APPL 6 SPNG 2 UNDPD 1 OVERWRAP

## (undated) DEVICE — Device: Brand: DENVER SPLINT

## (undated) DEVICE — CONTAINER SPEC HISTOLOGY 900ML POLYPR

## (undated) DEVICE — GUARD TEETH AD NYL FOR LARYNSCP POS PROTCT

## (undated) DEVICE — BIPOLAR FORCEPS CORD: Brand: VALLEYLAB

## (undated) DEVICE — DERMABOND SKIN ADH 0.7ML -- DERMABOND ADVANCED 12/BX

## (undated) DEVICE — WARMER SCP LAP

## (undated) DEVICE — PERI-PAD,MODERATE: Brand: CURITY

## (undated) DEVICE — SYR LR LCK 1ML GRAD NSAF 30ML --

## (undated) DEVICE — TROCARS: Brand: KII® BALLOON BLUNT TIP SYSTEM

## (undated) DEVICE — 3M™ STERI-STRIP™ REINFORCED ADHESIVE SKIN CLOSURES, R1546, 1/4 IN X 4 IN (6 MM X 100 MM), 10 STRIPS/ENVELOPE: Brand: 3M™ STERI-STRIP™

## (undated) DEVICE — CANNULA SEAL

## (undated) DEVICE — SUT ETHLN 5-0 18IN P3 BLK --

## (undated) DEVICE — PREP SKN POV IOD 10% SOL 4OZ --

## (undated) DEVICE — LEGGINGS, PAIR, 31X48, STERILE: Brand: MEDLINE

## (undated) DEVICE — CATHETER URETH 16FR BLLN 5CC SIL ALLY W/ SIL HYDRGEL 2 W F

## (undated) DEVICE — LUKI TUBE SPECIMEN COLLECTION DEVICE,20 ML: Brand: ARGYLE

## (undated) DEVICE — APPLICATOR FBR TIP L6IN COT TIP WOOD SHFT SWAB 2000 PER CA